# Patient Record
Sex: FEMALE | Race: WHITE | NOT HISPANIC OR LATINO | Employment: FULL TIME | URBAN - METROPOLITAN AREA
[De-identification: names, ages, dates, MRNs, and addresses within clinical notes are randomized per-mention and may not be internally consistent; named-entity substitution may affect disease eponyms.]

---

## 2020-01-29 ENCOUNTER — OFFICE VISIT (OUTPATIENT)
Dept: URGENT CARE | Facility: URGENT CARE | Age: 30
End: 2020-01-29
Payer: COMMERCIAL

## 2020-01-29 VITALS
OXYGEN SATURATION: 100 % | WEIGHT: 135 LBS | HEART RATE: 65 BPM | DIASTOLIC BLOOD PRESSURE: 73 MMHG | SYSTOLIC BLOOD PRESSURE: 110 MMHG | TEMPERATURE: 98.5 F

## 2020-01-29 DIAGNOSIS — L03.011 PARONYCHIA, FINGER, RIGHT: Primary | ICD-10-CM

## 2020-01-29 PROCEDURE — 99213 OFFICE O/P EST LOW 20 MIN: CPT | Mod: 25 | Performed by: PHYSICIAN ASSISTANT

## 2020-01-29 PROCEDURE — 10060 I&D ABSCESS SIMPLE/SINGLE: CPT | Performed by: PHYSICIAN ASSISTANT

## 2020-01-29 RX ORDER — LEVOTHYROXINE SODIUM 50 UG/1
50 TABLET ORAL
COMMUNITY
Start: 2016-04-13 | End: 2022-08-22

## 2020-01-29 RX ORDER — CEPHALEXIN 500 MG/1
500 CAPSULE ORAL 4 TIMES DAILY
Qty: 20 CAPSULE | Refills: 0 | Status: SHIPPED | OUTPATIENT
Start: 2020-01-29 | End: 2020-02-03

## 2020-01-29 ASSESSMENT — ENCOUNTER SYMPTOMS: WOUND: 1

## 2020-01-30 NOTE — PATIENT INSTRUCTIONS
Patient Education     Paronychia of the Finger or Toe  Paronychia is an infection near a fingernail or toenail. It usually occurs when an opening in the cuticle or an ingrown toenail lets bacteria under the skin.  The infection will need to be drained if pus is present. If the infection has been caught early, you may need only antibiotic treatment. Healing will take about 1 to 2 weeks.  Home care  Follow these guidelines when caring for yourself at home:    Clean and soak the toe or finger. Do this 2 times a day for the first 3 days. To do so:  ? Soak your foot or hand in a tub of warm water for 5 minutes. Or hold your toe or finger under a faucet of warm running water for 5 minutes.  ? Clean any crust away with soap and water using a cotton swab.  ? Put antibiotic ointment on the infected area.    Change the dressing daily or any time it gets dirty.    If you were given antibiotics, take them as directed until they are all gone.    If your infection is on a toe, wear comfortable shoes with a lot of toe room. You can also wear open-toed sandals while your toe heals.    You may use over-the-counter medicine (acetaminophen or ibuprofen to help with pain, unless another medicine was prescribed. If you have chronic liver or kidney disease, talk with your healthcare provider before using these medicines. Also talk with your provider if you've had a stomach ulcer or GI (gastrointestinal) bleeding.  Prevention  The following can prevent paronychia:    Avoid cutting or playing with your cuticles at home.    Don't bite your nails.    Don't suck on your thumbs or fingers.  Follow-up care  Follow up with your healthcare provider, or as advised.  When to seek medical advice  Call your healthcare provider right away if any of these occur:    Redness, pain, or swelling of the finger or toe gets worse    Red streaks in the skin leading away from the wound    Pus or fluid draining from the nail area    Fever of 100.4 F (38 C) or  higher, or as directed by your provider  Date Last Reviewed: 8/1/2016 2000-2019 [employer]. [address] [place] [address], [place] [address]. All rights reserved. This information is not intended as a substitute for professional medical care. Always follow your healthcare professional's instructions.

## 2020-01-30 NOTE — PROGRESS NOTES
SUBJECTIVE:   Nory Woods is a 29 year old female presenting with a chief complaint of   Chief Complaint   Patient presents with     Urgent Care     Blister     infection on middle finger pt thinks need to be drained       She is a new patient of [place].  Patient is RHD.  [facility], as they are unable to drain.  No fevers.  Patient has had for a 7-8 days.  Patient believes it was from a pulled cuticle.      Review of Systems   Skin: Positive for wound.   All other systems reviewed and are negative.      No past medical history on file.  No family history on file.  Current Outpatient Medications   Medication Sig Dispense Refill     cephALEXin (KEFLEX) 500 MG capsule Take 1 capsule (500 mg) by mouth 4 times daily for 5 days 20 capsule 0     levothyroxine (SYNTHROID/LEVOTHROID) 50 MCG tablet Take 50 mcg by mouth       sertraline (ZOLOFT) 50 MG tablet Take 50 mg by mouth       Social History     Tobacco Use     Smoking status: Never Smoker   Substance Use Topics     Alcohol use: No       OBJECTIVE  /73   Pulse 65   Temp 98.5  F (36.9  C) (Oral)   Wt 61.2 kg (135 lb)   SpO2 100%     Physical Exam  Vitals signs and nursing note reviewed.   Constitutional:       Appearance: Normal appearance. She is normal weight.   Eyes:      Extraocular Movements: Extraocular movements intact.      Conjunctiva/sclera: Conjunctivae normal.   Cardiovascular:      Rate and Rhythm: Normal rate.   Musculoskeletal: Normal range of motion.      Comments: RH, 3rd digit lateral aspect with 2 mm area of white fluid and minimally surrounding erythema.  Full ROM, good cap RF.   Skin:     General: Skin is warm and dry.      Capillary Refill: Capillary refill takes less than 2 seconds.   Neurological:      General: No focal deficit present.      Mental Status: She is alert and oriented to person, place, and time.   Psychiatric:         Mood and Affect: Mood normal.         Behavior: Behavior normal.          Labs:  No results found for this or any previous visit (from the past 24 hour(s)).    X-Ray was not done.    ASSESSMENT:      ICD-10-CM    1. Paronychia, finger, right L03.011 cephALEXin (KEFLEX) 500 MG capsule        Medical Decision Making:    Differential Diagnosis:  Paronychia, herpetic idvina    Serious Comorbid Conditions:  Adult:  None    PLAN:    I&D performed, well tolerated by patient. Small white return after incision made with #11 blade.   Patient bandaged with bacitracin.  Rx for keflex.  Discussed reasons to seek immediate medical attention.      Followup:    If not improving or if condition worsens, follow up with your Primary Care Provider, If not improving or if conditions worsens over the next 12-24 hours, go to the Emergency Department    Patient Instructions     Patient Education     Paronychia of the Finger or Toe  Paronychia is an infection near a fingernail or toenail. It usually occurs when an opening in the cuticle or an ingrown toenail lets bacteria under the skin.  The infection will need to be drained if pus is present. If the infection has been caught early, you may need only antibiotic treatment. Healing will take about 1 to 2 weeks.  Home care  Follow these guidelines when caring for yourself at home:    Clean and soak the toe or finger. Do this 2 times a day for the first 3 days. To do so:  ? Soak your foot or hand in a tub of warm water for 5 minutes. Or hold your toe or finger under a faucet of warm running water for 5 minutes.  ? Clean any crust away with soap and water using a cotton swab.  ? Put antibiotic ointment on the infected area.    Change the dressing daily or any time it gets dirty.    If you were given antibiotics, take them as directed until they are all gone.    If your infection is on a toe, wear comfortable shoes with a lot of toe room. You can also wear open-toed sandals while your toe heals.    You may use over-the-counter medicine (acetaminophen or  ibuprofen to help with pain, unless another medicine was prescribed. If you have chronic liver or kidney disease, talk with your healthcare provider before using these medicines. Also talk with your provider if you've had a stomach ulcer or GI (gastrointestinal) bleeding.  Prevention  The following can prevent paronychia:    Avoid cutting or playing with your cuticles at home.    Don't bite your nails.    Don't suck on your thumbs or fingers.  Follow-up care  Follow up with your healthcare provider, or as advised.  When to seek medical advice  Call your healthcare provider right away if any of these occur:    Redness, pain, or swelling of the finger or toe gets worse    Red streaks in the skin leading away from the wound    Pus or fluid draining from the nail area    Fever of 100.4 F (38 C) or higher, or as directed by your provider  Date Last Reviewed: 8/1/2016 2000-2019 [employer]. [address] [place] [address], [place] [address]. All rights reserved. This information is not intended as a substitute for professional medical care. Always follow your healthcare professional's instructions.

## 2020-12-06 ENCOUNTER — HEALTH MAINTENANCE LETTER (OUTPATIENT)
Age: 30
End: 2020-12-06

## 2021-09-25 ENCOUNTER — HEALTH MAINTENANCE LETTER (OUTPATIENT)
Age: 31
End: 2021-09-25

## 2021-11-18 RX ORDER — AZITHROMYCIN 500 MG/1
TABLET, FILM COATED ORAL
COMMUNITY
Start: 2019-01-02 | End: 2021-11-19

## 2021-11-19 ENCOUNTER — OFFICE VISIT (OUTPATIENT)
Dept: MIDWIFE SERVICES | Facility: CLINIC | Age: 31
End: 2021-11-19
Payer: COMMERCIAL

## 2021-11-19 VITALS
BODY MASS INDEX: 21.99 KG/M2 | SYSTOLIC BLOOD PRESSURE: 102 MMHG | WEIGHT: 140.1 LBS | DIASTOLIC BLOOD PRESSURE: 58 MMHG | HEIGHT: 67 IN

## 2021-11-19 DIAGNOSIS — Z00.00 ENCOUNTER FOR MEDICAL EXAMINATION TO ESTABLISH CARE: Primary | ICD-10-CM

## 2021-11-19 DIAGNOSIS — O36.80X0 PREGNANCY WITH INCONCLUSIVE FETAL VIABILITY, SINGLE OR UNSPECIFIED FETUS: ICD-10-CM

## 2021-11-19 LAB — B-HCG SERPL-ACNC: 212 IU/L (ref 0–5)

## 2021-11-19 PROCEDURE — 99202 OFFICE O/P NEW SF 15 MIN: CPT | Performed by: ADVANCED PRACTICE MIDWIFE

## 2021-11-19 PROCEDURE — 36415 COLL VENOUS BLD VENIPUNCTURE: CPT | Performed by: ADVANCED PRACTICE MIDWIFE

## 2021-11-19 PROCEDURE — 84702 CHORIONIC GONADOTROPIN TEST: CPT | Performed by: ADVANCED PRACTICE MIDWIFE

## 2021-11-19 RX ORDER — PRENATAL VIT/IRON FUM/FOLIC AC 27MG-0.8MG
1 TABLET ORAL DAILY
COMMUNITY
End: 2024-09-16

## 2021-11-19 RX ORDER — SERTRALINE HYDROCHLORIDE 25 MG/1
TABLET, FILM COATED ORAL
COMMUNITY
Start: 2021-09-17 | End: 2021-11-19

## 2021-11-19 ASSESSMENT — MIFFLIN-ST. JEOR: SCORE: 1393.12

## 2021-11-19 NOTE — PROGRESS NOTES
"  SUBJECTIVE:                                                   Nory Woods is a 31 year old who presents to clinic today for the following health issue(s):      HPI: Nory is here for a meet and greet, has a good friend who has seen our group for pregnancy care and recommended she see us. Had a positive pregnancy test on Sunday 11/14, LMP 10/13/21, making her 5w2d today. She would also like quant hcgs today to ensure pregnancy is progressing as expected.     Overall she is feeling well. Has not had any bleeding or spotting since LMP. Has had some mild intermittent cramping.     Hx of hypothyroidism, taking synthroid 50 mcg daily, management by outside provider.     Patient's last menstrual period was 10/13/2021 (exact date).  Menstrual History: frequency: every 28-30 days  Patient is sexually active  No obstetric history on file..  Using none for contraception.   Health maintenance updated:  yes  STI infx testing offered:  Declined      Problem list and histories reviewed & adjusted, as indicated.  Additional history: as documented.    Patient Active Problem List   Diagnosis     Hypothyroidism     Past Surgical History:   Procedure Laterality Date     WISDOM TOOTH EXTRACTION  2013      Social History     Tobacco Use     Smoking status: Never Smoker     Smokeless tobacco: Never Used   Substance Use Topics     Alcohol use: Not Currently      No data available            Current Outpatient Medications   Medication Sig     levothyroxine (SYNTHROID/LEVOTHROID) 50 MCG tablet Take 50 mcg by mouth     Prenatal Vit-Fe Fumarate-FA (PRENATAL MULTIVITAMIN W/IRON) 27-0.8 MG tablet Take 1 tablet by mouth daily     No current facility-administered medications for this visit.     No Known Allergies    ROS:  12 point review of systems negative other than symptoms noted below.    OBJECTIVE:     /58 (BP Location: Right arm, Patient Position: Sitting, Cuff Size: Adult Regular)   Ht 1.702 m (5' 7\")   Wt 63.5 kg (140 " lb 1.6 oz)   LMP 10/13/2021 (Exact Date)   Breastfeeding No   BMI 21.94 kg/m    Body mass index is 21.94 kg/m .    PHYSICAL EXAM:  Constitutional:  Appearance: Well nourished, well developed alert, in no acute distress  Chest:  Respiratory Effort:  Breathing unlabored.   Neurologic:  Mental Status:  Oriented X3.  Normal strength and tone, sensory exam grossly normal, mentation intact and speech normal.    Psychiatric:  Mentation appears normal and affect normal/bright.     In-Clinic Test Results:  No results found for this or any previous visit (from the past 24 hour(s)).    ASSESSMENT/PLAN:                                                        ICD-10-CM    1. Encounter for medical examination to establish care  Z00.00    2. Encounter to determine fetal viability of pregnancy  O36.80X0    3. Pregnancy with inconclusive fetal viability, single or unspecified fetus  O36.80X0 HCG Quantitative Pregnancy, Blood ([id number])     HCG Quantitative Pregnancy, Blood ([id number])     HCG Quantitative Pregnancy, Blood ([id number])       COUNSELING:  -Discussed [facility] philosophy of care, [facility] group/clinic/call structure.  -Hcg quant drawn today, discussed single value will not tell us much information, will need to repeat hcg in 48 hours to determine appropriate rise in levels. Discussed if hcg rises as expected then no need to continue with hcg levels and we can assume her pregnancy is progressing as expected as long is she is not having any vaginal bleeding/severe cramping/abdominal pain. Future hcg quant ordered   -SAB/ectopic warning signs reviewed, is aware of what to report and when to call   -Advised following up with endocrine provider, discussed it is not uncommon to need adjustments to synthroid dose in pregnancy   -RTC for for new OB appt/US approx. 8 wks from LMP    MAHAD Stone, [facility]

## 2021-11-19 NOTE — RESULT ENCOUNTER NOTE
Hcg result sent via [facility]. Plan for repeat hcg in 48 hours. If appropriate rise then no further hcg level needed unless she is having bleeding/cramping. Has new OB appt and US scheduled.     MAHAD Stone, [place]

## 2021-11-21 ENCOUNTER — LAB (OUTPATIENT)
Dept: LAB | Facility: CLINIC | Age: 31
End: 2021-11-21
Payer: COMMERCIAL

## 2021-11-21 DIAGNOSIS — O36.80X0 PREGNANCY WITH INCONCLUSIVE FETAL VIABILITY, SINGLE OR UNSPECIFIED FETUS: ICD-10-CM

## 2021-11-21 LAB — B-HCG SERPL-ACNC: 541 IU/L (ref 0–5)

## 2021-11-21 PROCEDURE — 84702 CHORIONIC GONADOTROPIN TEST: CPT

## 2021-11-21 PROCEDURE — 36415 COLL VENOUS BLD VENIPUNCTURE: CPT

## 2021-11-22 ENCOUNTER — TELEPHONE (OUTPATIENT)
Dept: MIDWIFE SERVICES | Facility: CLINIC | Age: 31
End: 2021-11-22
Payer: COMMERCIAL

## 2021-11-22 NOTE — TELEPHONE ENCOUNTER
LMP 10/13/21 - 5w5d    Noticed vaginal brown discharge just 30 min ago  Denies pain or cramping   Scant, brown mixed in vaginal discharge  Admits had IC earlier today.    Reassured pt spotting can be common in 1st trimester and after IC. Continue to monitor, push fluids. Nothing she did wrong.  Call w bright red bleeding and/or cramping/pain.    Pt verbalized understanding, in agreement with plan, and voiced no further questions.  Susan Morocho RN on 2021 at 2:12 PM

## 2021-12-02 ENCOUNTER — PRENATAL OFFICE VISIT (OUTPATIENT)
Dept: NURSING | Facility: CLINIC | Age: 31
End: 2021-12-02
Payer: COMMERCIAL

## 2021-12-02 VITALS — WEIGHT: 136 LBS | BODY MASS INDEX: 21.3 KG/M2

## 2021-12-02 DIAGNOSIS — Z34.01 ENCOUNTER FOR SUPERVISION OF NORMAL FIRST PREGNANCY IN FIRST TRIMESTER: Primary | ICD-10-CM

## 2021-12-02 PROCEDURE — 99207 PR NO CHARGE NURSE ONLY: CPT

## 2021-12-02 NOTE — PROGRESS NOTES
SUBJECTIVE:     HPI:    This is a 31 year old female patient,  who presents for her first obstetrical visit.    CHELSEY: 2022, by Last Menstrual Period.  She is 7w1d weeks.  Her cycles are regular.  Her last menstrual period was normal.   Since her LMP, she has experienced  nausea, fatigue and cramping).   She denies emesis, abdominal pain, headache, loss of appetite, vaginal discharge, dysuria, pelvic pain, urinary urgency, lightheadedness, urinary frequency, vaginal bleeding, hemorrhoids and constipation.    Additional History: mild depression- in past has been on sertraline-not currently on meds, hypothyroidism-on levothyroxine managed by PCP,  Kana Wylie-hx of cold sores  Avoids dairy d/t upset stomach however does eat yogurt.   Have you travelled during the pregnancy?Yes, If yes, where? In [place] not international if yes, who? [place]      HISTORY:   Planned Pregnancy: Yes  Marital Status:   Occupation:  currently works from home.   Living in Household: Spouse    Past History:  Her past medical history   Past Medical History:   Diagnosis Date     Depressive disorder      History of PCOS      Hypothyroidism      Mild depression (H)    .      She has a history of  first pregnancy     Since her last LMP she denies use of alcohol, tobacco and street drugs.    Past medical, surgical, social and family history were reviewed and updated in [facility].        Current Outpatient Medications   Medication     levothyroxine (SYNTHROID/LEVOTHROID) 50 MCG tablet     Prenatal Vit-Fe Fumarate-FA (PRENATAL MULTIVITAMIN W/IRON) 27-0.8 MG tablet     No current facility-administered medications for this visit.       ROS:   12 point review of systems negative other than symptoms noted below or in the HPI.  Constitutional: Fatigue  Gastrointestinal: Nausea  Genitourinary: Cramps      OBJECTIVE:     EXAM:  Wt 61.7 kg (136 lb)   LMP 10/13/2021 (Exact Date)   BMI 21.30 kg/m   Body mass  index is 21.3 kg/m .    Nurse phone visit completed. Prenatal book and folder (containing standard educational hand-outs and brochures)  will be given at the next visit to patient. Information in folder reviewed over the phone. Questions answered. Brochure given on optional screening available to assess chromosomal anomalies. Pt desires NIPS. Pt advised to call the clinic if she has any questions or concerns related to her pregnancy. Prenatal labs future ordered. New prenatal visit scheduled on 12/10/2021 with Julia Noel CNM.      No results found for: PAP  PHQ-9 score:    PHQ 12/1/2021   PHQ-9 Total Score 0   Q9: Thoughts of better off dead/self-harm past 2 weeks Not at all       DORCAS-7 SCORE 12/1/2021   Total Score 1 (minimal anxiety)   Total Score 1       Patient supplied answers from flow sheet for:  Prenatal OB Questionnaire.  Past Medical History  Have you ever recieved care for your mental health? : (!) Yes  Have you ever been in a major accident or suffered serious trauma?: No  Within the last year, has anyone hit, slapped, kicked or otherwise hurt you?: No  In the last year, has anyone forced you to have sex when you didn't want to?: No    Past Medical History 2   Have you ever received a blood transfusion?: No  Would you accept a blood transfusion if was medically recommended?: Yes  Does anyone in your home smoke?: No   Is your blood type Rh negative?: Unknown  Have you ever ?: No  Have you been hospitalized for a nonsurgical reason excluding normal delivery?: No  Have you ever had an abnormal pap smear?: No    Past Medical History (Continued)  Do you have a history of abnormalities of the uterus?: No  Did your mother take SONY or any other hormones when she was pregnant with you?: No  Do you have any other problems we have not asked about which you feel may be important to this pregnancy?: (!) Yes     Viky Martinez RN

## 2021-12-07 PROBLEM — Z34.00 SUPERVISION OF NORMAL FIRST PREGNANCY: Status: ACTIVE | Noted: 2021-12-07

## 2021-12-09 NOTE — PATIENT INSTRUCTIONS
Thank you for coming to see the Midwives at the   [facility]!      We will notify you about your labs that were drawn today once we get the results back.  If you have [employer] your lab results will be posted there.      Someone from the clinic will call you personally or send you a [employer] message with your results.      If you need any refills of medications please call your pharmacy and they will contact us.      If you have a medical emergency please call 911.      If you have any concerns about today's visit, wish to schedule another appointment, or have an urgent medical concern please call our office at [phone]. You can also make appointments through [employer].      After hours you may also call the clinic number above to be connected with [facility]'s after hours triage nurse.  The nurse can page the midwife on call if needed. There is always a midwife on call 24 hours a day.    Prenatal Care Recommendations:    Before 14 weeks: Dating ultrasound, genetic testing       This ultrasound helps us determine your dates accurately. Innatal (genetic screening test) can be drawn anytime after 10 weeks of gestation.    16 weeks: Optional genetic testing single AFP       This testing helps understand your baby's risk for some genetic abnormalities.    18-22 weeks:  Screening anatomy ultrasound       This testing will look for early growth abnormalities, placenta location, and may tell the baby's gender if you wish to find out.    24-27 weeks: One hour diabetes test (GCT) and complete blood count       This test helps identify diabetes of pregnancy or gestational diabetes.  We also look   at the iron in your blood and how well your blood clots.    28 - 36 weeks: Tetanus shot (Tdap)       This shot helps protect you and your baby from whooping cough.    36 weeks and later: Group B Strep test (GBS)       This test helps predict if you need antibiotics in labor to prevent infection for your baby.    Anytime  September to April:  Flu shot       This shot helps protect you and your family from the flu.  This is especially important during pregnancy.        The typical schedule after your first visit today you can expect:     Visit 2 - 12-16 weeks  Visit 3 - 20 weeks  Visit 4 - 24 weeks  Visit 5 - 28 weeks  Visit 6 - 30 weeks  Visit 7 - 32 weeks  Visit 8 - 34 weeks  Visit 9 - 36 weeks  Weekly after 36 weeks until delivery.        Any time during or after your pregnancy you may experience increased depression and/or mood changes.    We are here to support you. Please contact us if you are:    Feeling anxious    Overwhelmed or sad     Trouble sleeping    Crying uncontrollably    Trouble caring for yourself or baby.    Any thoughts of hurting yourself, your baby, or anyone else    If anything comes up between your visits or you have concerns please don't hesitate to contact us.    Secure access to your medical record:  Use Zady (secure email communication and access to your chart) to send your primary care provider a message or make an appointment. Ask someone on your Team how to sign up for Zady. To log on to GENELINK or for more information in Zady please visit the website at [email or web address].       Certified Nurse Midwife (CNM) Team    DELROY Rodriguez, MAHAD Reddy, DELROY, [facility]  Kevin Kruger DNP, APRN, CNM  Marilia Olivas DNP, APRBRYAN, CNM      Again, thank you for choosing the midwives at [facility].  We are excited to be a part of your pregnancy. Please let us know how we can best partner with you to improve your and your family's health.    Remedies for nausea and vomiting with pregnancy      Eat small frequent meals every 2-3 hours if possible.       Avoid food at extremes of temperature and drinks with carbonation.      Eat foods that appeal to you, avoiding fats and spicy foods.      Avoid liquids with foods.  Drink liquids 30-60 minutes  before or after eating and sip slowly.      Bread, pasta, crackers, potatoes, and rice tend to be tolerated the best.      Don't worry about what you eat in the first 3 months, it is more important that you can eat and keep it down.       Try flat ginger ale or ginger tea      Before rising in the morning, eat a small amount of crackers or dry toast.      Peppermint tea      Ginger is a herbal remedy for nausea and you can use it in any form.  There are ginger tablets you can purchase.  The dose 1000 mg a day in divided doses.       You may also try doxylamine (Unisom) 12.5 mg three times a day which is a sleeping medication along with Vitamin B6 25 mg three times a day.  This combination takes up to a week to work so give it some time.       Benadryl (diphenhydramine) 25-50 mg every 8 hour or Dramamine (dimenhydrinate)  mg by mouth every 4-6 hours. Both of these medication may cause some drowsiness      Other things that may help include an Accupressure band and acupuncture      If these methods fail there are many prescriptions that we can try    If you begin to vomit more than 5 or 6 times a day and feel that you are unable to keep anything down, call the [facility] at [phone]    Genetic Screening in Pregnancy    There are several options you have for genetic screening in your pregnancy.  Everyone has their own personal reasons to screen or not to screen.  We want you to make the best choice for you and your pregnancy.  Below is a list of options, what they screen for and when the screening is done.  Genetic screening is recommended for women who are 35 and older at delivery and for those with a family history of chromosomal abnormalities. However, screening is offered to all women.    Innatal:    This is a screening for the more common chromosome abnormalities, including trisomy 21 (Down's syndrome), trisomy 18, trisomy 13 and sex chromosome abnormalities. This is a prenatal test that  "can be done as early as 10 weeks gestation.       A maternal blood sample is drawn that sequences cell-free DNA in maternal blood stream. This in turn allows for molecular counting of chromosome copy numbers with a >99% detection rate of Down syndrome, a 97% detection rate of Trisomy 18, and a 78% detection rate of Trisomy 13.    This test will give information about the gender of the baby.  You can choose to not have that disclosed.       Results come back within 10-14 business days.     About 5% of samples will have results that are designated as \"indeterminate\" or \"uninterruptable.\" With this type of result, genetic counseling and diagnostic testing are recommended. Remember this is a screening test and does not definitively diagnose or exclude the presence of these chromosome conditions.     This screening may or may not be covered by insurance.  We recommend you consult your insurance company to discuss.  Financial assistance is available at a low cost if not covered by your insurance.       Standard Screen:  This test screens for 23 disorders that cause serious health effects in infancy or childhood.  Most hereditary genetic disorders are autosomal recessive, meaning both parents must be carriers for the child to be at risk.  There are some X-linked disorders where only the mother needs to be a carrier for the child to be at risk.   Below are some of the more common of the 23 disorders screened for:     1.  Cystic Fibrosis (CF) is the most common life-shortening autosomal  recessive disease among  populations, with a frequency of 1 in  Every 3,500 live births. Although it mainly affects the   Population anyone can request screening. If you have a family history of  cystic fibrosis you should request this test.  This screening is a blood  draw.      2.  Spinal Muscular Atrophy (SMA) is the most common inherited cause  of infant death.  The most common form of this disorder causes death by " "a age two.  One in every 6,000 to 10,000 babies born in the [place] has SMA.      3.  Fragile X Syndrome (FXS) is the most common inherited cause of  intellectual disability.  Approximately 1 in every 3,600 boys and 1 in every  6,000 girls is born with FXS.      4.  Hemoglobinopathy Evaluation:  Hemoglobin electrophoresis is a  non-invasive blood test that looks at abnormal hemoglobin (component of  red blood cells) production. Examples of this include sickle cell anemia  (which is a disorder of the red blood cells and their shape) and the  thalassemia s (which is also a form of anemia).  High risk groups include:   , Southeast Asians, , Mediterranean, Guinean,  South and Central American and [place] descent. This is a one-time  test.     5. Jae-Sachs (optional):  Is a disorder that results when an enzyme that            helps break down fatty substances is absent.  This is a fatal disorder.                This is most commonly passed from parents who are of Ashkenazi Congregation  descent. One in four to one in five individuals of Ashkenazi Congregation  descent carry a mutation for one of the autosomal recessive disorders  included in a group of disorders sometimes call \"Congregation genetic  Disorders\".      **If you are a carrier of CF, SMA, or a hemoglobinopathy, your partner will also need to be tested. This partner testing is offered at a reduced cost.  This screen can be done prior to pregnancy or at any time during the pregnancy.      Maternal Serum AFP  The screening is ideally done between 15 and 18 weeks of pregnancy but can be done up to week 24. Even if you have done the Innatal testing you can still get a single AFP drawn to check for neural tube defects like Spina Bifida.       Anatomy Ultrasound:    This is a fetal anatomy survey done around 20 weeks gestation.  This ultrasound will look at the heart structure, heart activity, fetal heart rate and rhythm, assessment of the amniotic fluid volume, " placenta appearance and location, the umbilical cord and placental insertion site.  They also do many fetal measurements to make sure the baby is growing properly.  The cervical length is also measured and fetal movement is evaluated.  The gender of the baby can usually be determined.      In the event of a positive screen:    If any screening tests come back with an increased risk, you will be referred to Maternal Fetal Medicine to be seen by a genetic counselor and a doctor.  They will assess your risk and see if further diagnostic testing is warranted.  The options for diagnosis that may be offered are: chorionic villus sampling (CVS), usually done at 11 to 12 weeks of pregnancy and amniocentesis which is generally done later in pregnancy around 15 to 20 weeks.  All risks/benefits would be explained and you can decide what course of action is best for you and your family.    Why you might choose to screen?    A desire to know as much as you can about your baby    If your baby had a genetic abnormality you can learn about it before they are born    Choose whether to continue the pregnancy or to terminate    Why you might choose to NOT screen?    You feel that whatever happens is fine and you would not terminate    You know you don't want to do any diagnostic tests even if the screening test showed a high possibility of a genetic abnormality      For further information please call:  [facility]   [phone]      Fish Safety During Pregnancy    Often time's women worry about eating fish during pregnancy. Fish can be totally safe to eat during pregnancy.However, some fish may contain contaminants that could harm you or your baby if you eat certain types of fish or eat fish too often. Below is a list of which types of fish to eat, how often to eat fish, and which types of fish to avoid while pregnant.    Reasons to eat fish:    Fish is a great source of protein, vitamins, and minerals.    The oils  found in fish are important to unborn and breast fed babies    Eating fish may play a role in the prevention of heart disease in adults       Fish to EAT while pregnant   2 servings per week of any of these types of fish    Catfish (farm raised)  Cod    Crab    Espinal    Flatfish   Oysters    [place]   [place] ([place], not [facility] [place])     Sardines   Scallops    Shrimp   Tilapia   1 serving per week of any of these fish    Canned LIGHT tuna     [place] caught-        Reginald Andersenie   [place]    Yellow Perch   1 serving per MONTH of these types of fish    Canned WHITE tuna  Martiniquais Sea Urbano    Grouper   Halibut    [place]    [place] Roughy    Tuna steak   [place] caught-    Bass    Catfish    [place] smaller than 20 inches    [place] smaller than 30 inches         Servings of fish should be based on your weight, 1 oz for every 20 lbs of body weight. For example: 130 lb person can safely eat 7 oz     150 lb person can safely eat 8 oz     170 lb person can safely eat 9 oz      Make sure to space out meals with fish throughout the month, don't eat all your fish meals for the month within a few days.       Fish to Avoid while Pregnant:      Shark   Juan Manuel Mackerel    Swordfish  Raw Sushi-any type of fish    Tile fish         [place] Caught:      [place] longer than 20 in    [place] longer than 30 in    Musky      Contaminants:      Mercury comes from air pollution and small amounts of mercury can damage the brain that is just starting to form or grow. Too much mercury may affect a child's behavior and lead to learning problems later in life. Too much mercury in adults and older children may cause tingling, numbness in hands and feet, or vision changes    PCBs a man-made substance once used in electrical transformers but was banned in 1967 although it can still be found in the [place]. A baby who are exposed to PCBs during pregnancy may have a lower birth weight, reduced head size,  and delayed physical development. Exposure to PCBs may also cause cancer    PFOS is a man-made chemical to make products that resist heat, oil, stains, and grease. Studies in lab animals exposed to low levels of PFOS showed decreas HDL (good cholesterol) and changes in thyroid hormone levels. The concern about PFOS is with long-term exposure such as consuming large amounts over a long period of time     Mercury and PFOS cannot be removed through cooking or cleaning. By removing fat when cleaning and cooking you can reduce PCBs.      For more information and up to date information about fish safety in [place] please contact the [place] Department of Health ([facility]) or the Department of Natural Resources (DNR):    [email or web address].mn.  DNR: [phone]  [facility]: [phone]    Recommendations for total and rate of weight gain during pregnancy, by prepregnancy BMI    Total weight gain Rates of weight gain*  2nd and 3rd trimester   Prepregnancy BMI Range in kg Range in lbs Mean (range) in kg/week Mean (range) in lbs/week   Underweight (<18.5 kg/m2) 12.5-18 28-40 0.51 (0.44-0.58) 1 (1-1.3)   Normal weight (18.5-24.9 kg/m2) 11.5-16 25-35 0.42 (0.35-0.50) 1 (0.8-1)   Overweight (25.0-29.9 kg/m2) 7-11.5 15-25 0.28 (0.23-0.33) 0.6 (0.5-0.7)   Obese (?30.0 kg/m2) 5-9 11-20 0.22 (0.17-0.27) 0.5 (0.4-0.6)   BMI: body mass index.  * Calculations assume a 0.5-2 kg (1.1-4.4 lbs) weight gain in the first trimester.  * To calculate BMI go to [email or web address]/

## 2021-12-10 ENCOUNTER — ANCILLARY PROCEDURE (OUTPATIENT)
Dept: ULTRASOUND IMAGING | Facility: CLINIC | Age: 31
End: 2021-12-10
Payer: COMMERCIAL

## 2021-12-10 ENCOUNTER — PRENATAL OFFICE VISIT (OUTPATIENT)
Dept: MIDWIFE SERVICES | Facility: CLINIC | Age: 31
End: 2021-12-10
Payer: COMMERCIAL

## 2021-12-10 VITALS — BODY MASS INDEX: 22.08 KG/M2 | DIASTOLIC BLOOD PRESSURE: 68 MMHG | WEIGHT: 141 LBS | SYSTOLIC BLOOD PRESSURE: 108 MMHG

## 2021-12-10 DIAGNOSIS — E03.9 HYPOTHYROIDISM AFFECTING PREGNANCY, ANTEPARTUM: Primary | ICD-10-CM

## 2021-12-10 DIAGNOSIS — Z91.89: ICD-10-CM

## 2021-12-10 DIAGNOSIS — R11.0 NAUSEA: ICD-10-CM

## 2021-12-10 DIAGNOSIS — Z34.01 ENCOUNTER FOR SUPERVISION OF NORMAL FIRST PREGNANCY IN FIRST TRIMESTER: ICD-10-CM

## 2021-12-10 DIAGNOSIS — O99.280 HYPOTHYROIDISM AFFECTING PREGNANCY, ANTEPARTUM: Primary | ICD-10-CM

## 2021-12-10 DIAGNOSIS — Z34.01 ENCOUNTER FOR SUPERVISION OF NORMAL FIRST PREGNANCY IN FIRST TRIMESTER: Primary | ICD-10-CM

## 2021-12-10 DIAGNOSIS — Z3A.08 8 WEEKS GESTATION OF PREGNANCY: ICD-10-CM

## 2021-12-10 LAB
ABO/RH(D): NORMAL
ALBUMIN UR-MCNC: NEGATIVE MG/DL
ANTIBODY SCREEN: NEGATIVE
APPEARANCE UR: CLEAR
BILIRUB UR QL STRIP: NEGATIVE
COLOR UR AUTO: YELLOW
ERYTHROCYTE [DISTWIDTH] IN BLOOD BY AUTOMATED COUNT: 12.3 % (ref 10–15)
GLUCOSE UR STRIP-MCNC: NEGATIVE MG/DL
HBV SURFACE AG SERPL QL IA: NONREACTIVE
HCT VFR BLD AUTO: 40.6 % (ref 35–47)
HCV AB SERPL QL IA: NONREACTIVE
HGB BLD-MCNC: 13.4 G/DL (ref 11.7–15.7)
HGB UR QL STRIP: NEGATIVE
HIV 1+2 AB+HIV1 P24 AG SERPL QL IA: NONREACTIVE
KETONES UR STRIP-MCNC: NEGATIVE MG/DL
LEUKOCYTE ESTERASE UR QL STRIP: NEGATIVE
MCH RBC QN AUTO: 28.6 PG (ref 26.5–33)
MCHC RBC AUTO-ENTMCNC: 33 G/DL (ref 31.5–36.5)
MCV RBC AUTO: 87 FL (ref 78–100)
NITRATE UR QL: NEGATIVE
PH UR STRIP: 7 [PH] (ref 5–7)
PLATELET # BLD AUTO: 214 10E3/UL (ref 150–450)
RBC # BLD AUTO: 4.69 10E6/UL (ref 3.8–5.2)
RUBV IGG SERPL QL IA: 1.5 INDEX
RUBV IGG SERPL QL IA: POSITIVE
SP GR UR STRIP: 1.02 (ref 1–1.03)
SPECIMEN EXPIRATION DATE: NORMAL
T PALLIDUM AB SER QL: NONREACTIVE
UROBILINOGEN UR STRIP-ACNC: 0.2 E.U./DL
WBC # BLD AUTO: 4.7 10E3/UL (ref 4–11)

## 2021-12-10 PROCEDURE — 86803 HEPATITIS C AB TEST: CPT | Performed by: ADVANCED PRACTICE MIDWIFE

## 2021-12-10 PROCEDURE — 84443 ASSAY THYROID STIM HORMONE: CPT | Performed by: ADVANCED PRACTICE MIDWIFE

## 2021-12-10 PROCEDURE — 86901 BLOOD TYPING SEROLOGIC RH(D): CPT | Performed by: ADVANCED PRACTICE MIDWIFE

## 2021-12-10 PROCEDURE — 86900 BLOOD TYPING SEROLOGIC ABO: CPT | Performed by: ADVANCED PRACTICE MIDWIFE

## 2021-12-10 PROCEDURE — 87340 HEPATITIS B SURFACE AG IA: CPT | Performed by: ADVANCED PRACTICE MIDWIFE

## 2021-12-10 PROCEDURE — 86762 RUBELLA ANTIBODY: CPT | Performed by: ADVANCED PRACTICE MIDWIFE

## 2021-12-10 PROCEDURE — 85027 COMPLETE CBC AUTOMATED: CPT | Performed by: ADVANCED PRACTICE MIDWIFE

## 2021-12-10 PROCEDURE — 76817 TRANSVAGINAL US OBSTETRIC: CPT | Performed by: OBSTETRICS & GYNECOLOGY

## 2021-12-10 PROCEDURE — 87389 HIV-1 AG W/HIV-1&-2 AB AG IA: CPT | Performed by: ADVANCED PRACTICE MIDWIFE

## 2021-12-10 PROCEDURE — 36415 COLL VENOUS BLD VENIPUNCTURE: CPT | Performed by: ADVANCED PRACTICE MIDWIFE

## 2021-12-10 PROCEDURE — 87086 URINE CULTURE/COLONY COUNT: CPT | Performed by: ADVANCED PRACTICE MIDWIFE

## 2021-12-10 PROCEDURE — 86780 TREPONEMA PALLIDUM: CPT | Performed by: ADVANCED PRACTICE MIDWIFE

## 2021-12-10 PROCEDURE — 99207 PR FIRST OB VISIT: CPT | Performed by: ADVANCED PRACTICE MIDWIFE

## 2021-12-10 PROCEDURE — 86850 RBC ANTIBODY SCREEN: CPT | Performed by: ADVANCED PRACTICE MIDWIFE

## 2021-12-10 PROCEDURE — 81003 URINALYSIS AUTO W/O SCOPE: CPT | Performed by: ADVANCED PRACTICE MIDWIFE

## 2021-12-11 LAB — BACTERIA UR CULT: NO GROWTH

## 2021-12-12 LAB — TSH SERPL DL<=0.005 MIU/L-ACNC: 1.32 MU/L (ref 0.4–4)

## 2021-12-21 NOTE — H&P (VIEW-ONLY)
Nory Woods is a 31 year old female, 10w0d weeks gestation by LMP.  Ultrasound performed here in clinic today confirms missed AB, no fetal heartrate.   Patient is not currently bleeding.   Patient is not cramping/having abdominal pain.  Patient is seen here with her  Dejan and informed of the results.  Reassured her that the loss could not have been stopped by her actions.  Nory had US 12/10 that showed baby measuring 7w with ; today no FHT measuring 8w2d.  Nory is tearful, she wishes to discuss next steps, this was there first try, is thankful she was able to get pregnant when she has many friends struggling with fertility issues. 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    Patient Active Problem List   Diagnosis     Hypothyroidism     Supervision of normal first pregnancy     Past Medical History:   Diagnosis Date     Depressive disorder      History of PCOS 2011     Hypothyroidism 2011     Mild depression (H)      Supervision of normal first pregnancy 12/7/2021     Past Surgical History:   Procedure Laterality Date     WISDOM TOOTH EXTRACTION  2013     Current Outpatient Medications   Medication Sig Dispense Refill     levothyroxine (SYNTHROID/LEVOTHROID) 50 MCG tablet Take 50 mcg by mouth       Prenatal Vit-Fe Fumarate-FA (PRENATAL MULTIVITAMIN W/IRON) 27-0.8 MG tablet Take 1 tablet by mouth daily       No Known Allergies    Health maintenance updated:  yes     LMP 10/13/2021 (Exact Date)      Physical Exam  Constitutional:       Appearance: Normal appearance.   Cardiovascular:      Rate and Rhythm: Normal rate and regular rhythm.   Pulmonary:      Effort: Pulmonary effort is normal.      Breath sounds: Normal breath sounds.   Skin:     General: Skin is warm and dry.   Neurological:      Mental Status: She is alert and oriented to person, place, and time.   Psychiatric:          Mood and Affect: Mood normal.         Behavior: Behavior normal.      Comments: Appropriately emotional/tearful         O POS  Assess need for Rhogam:  NO      ASSESSMENT/PLAN:     ICD-10-CM    1. Missed   O02.1    2. Encounter to discuss test results  Z71.2          COUNSELING:    Discussed expectations of bleeding and options and indications for D&C in relation to miscarriage.     Reviewed expectant management, medications, and D&C, likely leaning toward this option but wishes to think about it and will call us back in the next day or two with her decision     Discussed causes of miscarriage including chromosome abnormalities. Understands that nothing can be done to prevent a miscarriage from occuring.       Can use Ibuprofen for the cramping/Narcotics if needed.    We reviewed bleeding can be significant if managed at home, call with any concerns. Not currently bleeding    Encouraged good self care and utilizing support system    Handout provided about options discussed today    Recommend she check in tomorrow to coordinate D&C if desired, labs done at new OB    20 minutes spent on the date of the encounter doing chart review, review of test results, patient visit and documentation     MAHAD Robbins, DELROY

## 2021-12-22 ENCOUNTER — TELEPHONE (OUTPATIENT)
Dept: MIDWIFE SERVICES | Facility: CLINIC | Age: 31
End: 2021-12-22

## 2021-12-22 ENCOUNTER — ANCILLARY PROCEDURE (OUTPATIENT)
Dept: ULTRASOUND IMAGING | Facility: CLINIC | Age: 31
End: 2021-12-22
Attending: ADVANCED PRACTICE MIDWIFE
Payer: COMMERCIAL

## 2021-12-22 ENCOUNTER — TELEPHONE (OUTPATIENT)
Dept: OBGYN | Facility: CLINIC | Age: 31
End: 2021-12-22

## 2021-12-22 ENCOUNTER — VIRTUAL VISIT (OUTPATIENT)
Dept: MIDWIFE SERVICES | Facility: CLINIC | Age: 31
End: 2021-12-22
Payer: COMMERCIAL

## 2021-12-22 DIAGNOSIS — Z71.2 ENCOUNTER TO DISCUSS TEST RESULTS: ICD-10-CM

## 2021-12-22 DIAGNOSIS — O02.1 MISSED ABORTION: Primary | ICD-10-CM

## 2021-12-22 DIAGNOSIS — Z34.01 ENCOUNTER FOR SUPERVISION OF NORMAL FIRST PREGNANCY IN FIRST TRIMESTER: ICD-10-CM

## 2021-12-22 PROCEDURE — 99213 OFFICE O/P EST LOW 20 MIN: CPT | Performed by: ADVANCED PRACTICE MIDWIFE

## 2021-12-22 PROCEDURE — 76817 TRANSVAGINAL US OBSTETRIC: CPT | Performed by: OBSTETRICS & GYNECOLOGY

## 2021-12-22 NOTE — RESULT ENCOUNTER NOTE
Results discussed directly with patient while patient was present. Any further details documented in the note.   MAHAD Solomon CNM

## 2021-12-22 NOTE — TELEPHONE ENCOUNTER
Called patient back, she wishes to proceed with D&C next week while she is off. Will work with MDs to get a case request and explore OR schedule. Will try to touch base with her by the end of the day or tomorrow morning.    MAHAD Robbins, VERONIQUEM

## 2021-12-22 NOTE — TELEPHONE ENCOUNTER
TIMEFINDER has been dropped.  Marie w/[facility] is holding time at 12:10 for this case on 12/28.  Once case request is received, dump into depot and call to schedule    Joya Ferraro  Surgery Scheduler

## 2021-12-23 ENCOUNTER — TELEPHONE (OUTPATIENT)
Dept: MIDWIFE SERVICES | Facility: CLINIC | Age: 31
End: 2021-12-23
Payer: COMMERCIAL

## 2021-12-23 NOTE — TELEPHONE ENCOUNTER
"Call to Nory to check in. Reports receiving a call from Nasrin Yadav today to discuss scheduling a D&C. This is the option Nory and her  have decided upon, \"just want to move on. \"  Dr. Iraheta will perform Nory's D&C, likely next Tuesday. Just waiting for final orders.   Encouraged Nory to reach out with any additional concerns or questions.     Julia TOBIN CNM    "

## 2021-12-24 ENCOUNTER — PREP FOR PROCEDURE (OUTPATIENT)
Dept: OBGYN | Facility: CLINIC | Age: 31
End: 2021-12-24
Payer: COMMERCIAL

## 2021-12-24 DIAGNOSIS — Z11.59 ENCOUNTER FOR SCREENING FOR OTHER VIRAL DISEASES: ICD-10-CM

## 2021-12-24 DIAGNOSIS — O02.1 MISSED ABORTION: Primary | ICD-10-CM

## 2021-12-24 NOTE — TELEPHONE ENCOUNTER
Radha Iraheta MD  [facility] Surgery Scheduling  Patient Name: Nory Woods   MRN: [id number]   Case#: [id number]   Surgeon(s) and Role:      * Radha Iraheta MD - Primary   Date requested: 12/28/2021   Location:  OR   Procedure(s):   DILATION AND CURETTAGE, UTERUS, USING SUCTION (N/A)     This case was generated via a case request order.             Case Information    Patient: Nory Woods [[id number]]   Case:  [id number]

## 2021-12-24 NOTE — TELEPHONE ENCOUNTER
PRE-OP DIAGNOSIS - MISSED AB O02.1    ERAS BAG GIVEN No  ERAS INSTRUCTIONS EXPLAINED N/A    ASSIST: NA    H&P TO BE COMPLETED BY:   Surgeon  FOR H&P TO BE DONE BY SURGEON   UPDATE VISIT ON DATE AT HOSPITAL  SAME DAY/OBSERVATION/INPATIENT: STRAIGHT SAME DAY  EQUIPMENT: ROUTINE  VENDOR NEEDED AT CASE: NA  IF IUD WHAT BRAND NA  LABS/SPECIAL INSTRUCTIONS: ROUTINE  TIME OFF WORK: 1-2 DAYS  ESTIMATED DOCTOR TIME NEEDED IN MINUTES 30  POST OP TO BE SCHEDULED AT 4-6 WEEKS WITH THE MIDWIVES

## 2021-12-24 NOTE — TELEPHONE ENCOUNTER
Type of surgery: DILATION AND CURETTAGE, UTERUS, USING SUCTION  Location of surgery: [facility]  Date and time of surgery: 12/28/2021   Surgeon: LAURIE  Pre-Op Appt Date: UPDATE AT [facility]  Post-Op Appt Date: 2/1/2022 ADITYA MEJIA  Packet sent out: EMAILED  Pre-cert/Authorization completed:  TBD  Date: 12/24/2021 SCHEDULED W/ EBONY    COVID TEST SCHEDULED FOR 12/26 @ 2:30 PM

## 2021-12-26 ENCOUNTER — LAB (OUTPATIENT)
Dept: URGENT CARE | Facility: URGENT CARE | Age: 31
End: 2021-12-26
Payer: COMMERCIAL

## 2021-12-26 DIAGNOSIS — Z11.59 ENCOUNTER FOR SCREENING FOR OTHER VIRAL DISEASES: ICD-10-CM

## 2021-12-26 PROCEDURE — U0005 INFEC AGEN DETEC AMPLI PROBE: HCPCS

## 2021-12-26 PROCEDURE — U0003 INFECTIOUS AGENT DETECTION BY NUCLEIC ACID (DNA OR RNA); SEVERE ACUTE RESPIRATORY SYNDROME CORONAVIRUS 2 (SARS-COV-2) (CORONAVIRUS DISEASE [COVID-19]), AMPLIFIED PROBE TECHNIQUE, MAKING USE OF HIGH THROUGHPUT TECHNOLOGIES AS DESCRIBED BY CMS-2020-01-R: HCPCS

## 2021-12-27 LAB — SARS-COV-2 RNA RESP QL NAA+PROBE: NEGATIVE

## 2021-12-28 ENCOUNTER — HOSPITAL ENCOUNTER (OUTPATIENT)
Facility: CLINIC | Age: 31
Discharge: HOME | End: 2021-12-28
Attending: OBSTETRICS & GYNECOLOGY | Admitting: OBSTETRICS & GYNECOLOGY
Payer: COMMERCIAL

## 2021-12-28 ENCOUNTER — ANESTHESIA (OUTPATIENT)
Dept: SURGERY | Facility: CLINIC | Age: 31
End: 2021-12-28
Payer: COMMERCIAL

## 2021-12-28 ENCOUNTER — ANESTHESIA EVENT (OUTPATIENT)
Dept: SURGERY | Facility: CLINIC | Age: 31
End: 2021-12-28
Payer: COMMERCIAL

## 2021-12-28 VITALS
WEIGHT: 137.7 LBS | SYSTOLIC BLOOD PRESSURE: 113 MMHG | OXYGEN SATURATION: 100 % | HEART RATE: 74 BPM | HEIGHT: 67 IN | DIASTOLIC BLOOD PRESSURE: 72 MMHG | TEMPERATURE: 97.5 F | BODY MASS INDEX: 21.61 KG/M2 | RESPIRATION RATE: 16 BRPM

## 2021-12-28 PROCEDURE — 272N000001 HC OR GENERAL SUPPLY STERILE: Performed by: OBSTETRICS & GYNECOLOGY

## 2021-12-28 PROCEDURE — 250N000009 HC RX 250: Performed by: OBSTETRICS & GYNECOLOGY

## 2021-12-28 PROCEDURE — 360N000075 HC SURGERY LEVEL 2, PER MIN: Performed by: OBSTETRICS & GYNECOLOGY

## 2021-12-28 PROCEDURE — 88305 TISSUE EXAM BY PATHOLOGIST: CPT | Mod: TC | Performed by: OBSTETRICS & GYNECOLOGY

## 2021-12-28 PROCEDURE — 710N000009 HC RECOVERY PHASE 1, LEVEL 1, PER MIN: Performed by: OBSTETRICS & GYNECOLOGY

## 2021-12-28 PROCEDURE — 59820 CARE OF MISCARRIAGE: CPT | Performed by: OBSTETRICS & GYNECOLOGY

## 2021-12-28 PROCEDURE — 370N000017 HC ANESTHESIA TECHNICAL FEE, PER MIN: Performed by: OBSTETRICS & GYNECOLOGY

## 2021-12-28 PROCEDURE — 250N000025 HC SEVOFLURANE, PER MIN: Performed by: OBSTETRICS & GYNECOLOGY

## 2021-12-28 PROCEDURE — 258N000003 HC RX IP 258 OP 636: Performed by: ANESTHESIOLOGY

## 2021-12-28 PROCEDURE — 250N000009 HC RX 250: Performed by: NURSE ANESTHETIST, CERTIFIED REGISTERED

## 2021-12-28 PROCEDURE — 250N000011 HC RX IP 250 OP 636: Performed by: NURSE ANESTHETIST, CERTIFIED REGISTERED

## 2021-12-28 PROCEDURE — 999N000141 HC STATISTIC PRE-PROCEDURE NURSING ASSESSMENT: Performed by: OBSTETRICS & GYNECOLOGY

## 2021-12-28 PROCEDURE — 710N000012 HC RECOVERY PHASE 2, PER MINUTE: Performed by: OBSTETRICS & GYNECOLOGY

## 2021-12-28 RX ORDER — FENTANYL CITRATE 50 UG/ML
25 INJECTION, SOLUTION INTRAMUSCULAR; INTRAVENOUS EVERY 5 MIN PRN
Status: DISCONTINUED | OUTPATIENT
Start: 2021-12-28 | End: 2021-12-28 | Stop reason: HOSPADM

## 2021-12-28 RX ORDER — PROPOFOL 10 MG/ML
INJECTION, EMULSION INTRAVENOUS CONTINUOUS PRN
Status: DISCONTINUED | OUTPATIENT
Start: 2021-12-28 | End: 2021-12-28

## 2021-12-28 RX ORDER — IBUPROFEN 400 MG/1
800 TABLET, FILM COATED ORAL ONCE
Status: DISCONTINUED | OUTPATIENT
Start: 2021-12-28 | End: 2021-12-28 | Stop reason: HOSPADM

## 2021-12-28 RX ORDER — ACETAMINOPHEN 325 MG/1
975 TABLET ORAL ONCE
Status: DISCONTINUED | OUTPATIENT
Start: 2021-12-28 | End: 2021-12-28 | Stop reason: HOSPADM

## 2021-12-28 RX ORDER — PROPOFOL 10 MG/ML
INJECTION, EMULSION INTRAVENOUS PRN
Status: DISCONTINUED | OUTPATIENT
Start: 2021-12-28 | End: 2021-12-28

## 2021-12-28 RX ORDER — ONDANSETRON 2 MG/ML
4 INJECTION INTRAMUSCULAR; INTRAVENOUS EVERY 30 MIN PRN
Status: DISCONTINUED | OUTPATIENT
Start: 2021-12-28 | End: 2021-12-28 | Stop reason: HOSPADM

## 2021-12-28 RX ORDER — SODIUM CHLORIDE, SODIUM LACTATE, POTASSIUM CHLORIDE, CALCIUM CHLORIDE 600; 310; 30; 20 MG/100ML; MG/100ML; MG/100ML; MG/100ML
INJECTION, SOLUTION INTRAVENOUS CONTINUOUS
Status: DISCONTINUED | OUTPATIENT
Start: 2021-12-28 | End: 2021-12-28 | Stop reason: HOSPADM

## 2021-12-28 RX ORDER — OXYCODONE HYDROCHLORIDE 5 MG/1
5 TABLET ORAL EVERY 4 HOURS PRN
Status: DISCONTINUED | OUTPATIENT
Start: 2021-12-28 | End: 2021-12-28 | Stop reason: HOSPADM

## 2021-12-28 RX ORDER — ONDANSETRON 4 MG/1
4 TABLET, ORALLY DISINTEGRATING ORAL EVERY 30 MIN PRN
Status: DISCONTINUED | OUTPATIENT
Start: 2021-12-28 | End: 2021-12-28 | Stop reason: HOSPADM

## 2021-12-28 RX ORDER — HYDROMORPHONE HCL IN WATER/PF 6 MG/30 ML
0.2 PATIENT CONTROLLED ANALGESIA SYRINGE INTRAVENOUS EVERY 5 MIN PRN
Status: DISCONTINUED | OUTPATIENT
Start: 2021-12-28 | End: 2021-12-28 | Stop reason: HOSPADM

## 2021-12-28 RX ORDER — DOXYCYCLINE 100 MG/10ML
100 INJECTION, POWDER, LYOPHILIZED, FOR SOLUTION INTRAVENOUS ONCE
Status: COMPLETED | OUTPATIENT
Start: 2021-12-28 | End: 2021-12-28

## 2021-12-28 RX ORDER — DEXAMETHASONE SODIUM PHOSPHATE 4 MG/ML
INJECTION, SOLUTION INTRA-ARTICULAR; INTRALESIONAL; INTRAMUSCULAR; INTRAVENOUS; SOFT TISSUE PRN
Status: DISCONTINUED | OUTPATIENT
Start: 2021-12-28 | End: 2021-12-28

## 2021-12-28 RX ORDER — ONDANSETRON 2 MG/ML
INJECTION INTRAMUSCULAR; INTRAVENOUS PRN
Status: DISCONTINUED | OUTPATIENT
Start: 2021-12-28 | End: 2021-12-28

## 2021-12-28 RX ORDER — LIDOCAINE HYDROCHLORIDE 20 MG/ML
INJECTION, SOLUTION INFILTRATION; PERINEURAL PRN
Status: DISCONTINUED | OUTPATIENT
Start: 2021-12-28 | End: 2021-12-28

## 2021-12-28 RX ORDER — FENTANYL CITRATE 50 UG/ML
INJECTION, SOLUTION INTRAMUSCULAR; INTRAVENOUS PRN
Status: DISCONTINUED | OUTPATIENT
Start: 2021-12-28 | End: 2021-12-28

## 2021-12-28 RX ORDER — KETOROLAC TROMETHAMINE 30 MG/ML
INJECTION, SOLUTION INTRAMUSCULAR; INTRAVENOUS PRN
Status: DISCONTINUED | OUTPATIENT
Start: 2021-12-28 | End: 2021-12-28

## 2021-12-28 RX ADMIN — PROPOFOL 200 MCG/KG/MIN: 10 INJECTION, EMULSION INTRAVENOUS at 13:06

## 2021-12-28 RX ADMIN — PROPOFOL 200 MG: 10 INJECTION, EMULSION INTRAVENOUS at 13:03

## 2021-12-28 RX ADMIN — ONDANSETRON 4 MG: 2 INJECTION INTRAMUSCULAR; INTRAVENOUS at 13:07

## 2021-12-28 RX ADMIN — KETOROLAC TROMETHAMINE 30 MG: 30 INJECTION, SOLUTION INTRAMUSCULAR at 13:18

## 2021-12-28 RX ADMIN — LIDOCAINE HYDROCHLORIDE 100 MG: 20 INJECTION, SOLUTION INFILTRATION; PERINEURAL at 13:03

## 2021-12-28 RX ADMIN — MIDAZOLAM 2 MG: 1 INJECTION INTRAMUSCULAR; INTRAVENOUS at 13:00

## 2021-12-28 RX ADMIN — SODIUM CHLORIDE, POTASSIUM CHLORIDE, SODIUM LACTATE AND CALCIUM CHLORIDE: 600; 310; 30; 20 INJECTION, SOLUTION INTRAVENOUS at 13:00

## 2021-12-28 RX ADMIN — DOXYCYCLINE 100 MG: 100 INJECTION, POWDER, LYOPHILIZED, FOR SOLUTION INTRAVENOUS at 13:07

## 2021-12-28 RX ADMIN — FENTANYL CITRATE 50 MCG: 50 INJECTION, SOLUTION INTRAMUSCULAR; INTRAVENOUS at 13:07

## 2021-12-28 RX ADMIN — DEXAMETHASONE SODIUM PHOSPHATE 4 MG: 4 INJECTION, SOLUTION INTRA-ARTICULAR; INTRALESIONAL; INTRAMUSCULAR; INTRAVENOUS; SOFT TISSUE at 13:07

## 2021-12-28 ASSESSMENT — MIFFLIN-ST. JEOR: SCORE: 1382.23

## 2021-12-28 NOTE — BRIEF OP NOTE
[facility]    Brief Operative Note    Pre-operative diagnosis: Missed  [O02.1]  Post-operative diagnosis Same as pre-operative diagnosis    Procedure: Procedure(s):  DILATION AND CURETTAGE, UTERUS, USING SUCTION  Surgeon: Surgeon(s) and Role:     * Radha Iraheta MD - Primary  Anesthesia: General   Estimated Blood Loss: Less than 50 ml    Drains: None  Specimens:   ID Type Source Tests Collected by Time Destination   1 : products of conception Products of Conception Products of Conception SURGICAL PATHOLOGY EXAM Radha Iraheta MD 2021  1:14 PM      Findings:   normal retroverted uterus sounding to 8,5cm. easy dilation to #8 hegar. #8 curved curette used to suciton out the uterus of appropriate contents. had some initial brisk bleeding so ordered TXA and then just did a uterine bimanual massage adn all resolved so TXA not given.  Complications: None.  Implants: * No implants in log *

## 2021-12-28 NOTE — ANESTHESIA PREPROCEDURE EVALUATION
Anesthesia Pre-Procedure Evaluation    Patient: Nory Woods   MRN: [id number] : 1990        Preoperative Diagnosis: Missed  [O02.1]    Procedure : Procedure(s):  DILATION AND CURETTAGE, UTERUS, USING SUCTION          Past Medical History:   Diagnosis Date     Depressive disorder      History of PCOS      Hypothyroidism      Mild depression (H)      Supervision of normal first pregnancy 2021      Past Surgical History:   Procedure Laterality Date     WISDOM TOOTH EXTRACTION        No Known Allergies   Social History     Tobacco Use     Smoking status: Never Smoker     Smokeless tobacco: Never Used   Substance Use Topics     Alcohol use: Not Currently      Wt Readings from Last 1 Encounters:   21 62.5 kg (137 lb 11.2 oz)        Anesthesia Evaluation            ROS/MED HX  ENT/Pulmonary:  - neg pulmonary ROS     Neurologic:  - neg neurologic ROS     Cardiovascular:  - neg cardiovascular ROS     METS/Exercise Tolerance:     Hematologic:  - neg hematologic  ROS     Musculoskeletal:  - neg musculoskeletal ROS     GI/Hepatic:  - neg GI/hepatic ROS     Renal/Genitourinary: Comment: Missed AB      Endo:     (+) thyroid problem, hypothyroidism,     Psychiatric/Substance Use:     (+) psychiatric history depression     Infectious Disease:  - neg infectious disease ROS     Malignancy:  - neg malignancy ROS     Other:  - neg other ROS          Physical Exam    Airway        Mallampati: II   TM distance: > 3 FB   Neck ROM: full   Mouth opening: > 3 cm    Respiratory Devices and Support         Dental  no notable dental history         Cardiovascular   cardiovascular exam normal       Rhythm and rate: regular     Pulmonary   pulmonary exam normal        breath sounds clear to auscultation           OUTSIDE LABS:  CBC:   Lab Results   Component Value Date    WBC 4.7 12/10/2021    HGB 13.4 12/10/2021    HCT 40.6 12/10/2021     12/10/2021     BMP: No results found for: NA,  POTASSIUM, CHLORIDE, CO2, BUN, CR, GLC  COAGS: No results found for: PTT, INR, FIBR  POC: No results found for: BGM, HCG, HCGS  HEPATIC: No results found for: ALBUMIN, PROTTOTAL, ALT, AST, GGT, ALKPHOS, BILITOTAL, BILIDIRECT, RENE  OTHER:   Lab Results   Component Value Date    TSH 1.32 12/10/2021       Anesthesia Plan    ASA Status:  2   NPO Status:  NPO Appropriate    Anesthesia Type: General.     - Airway: LMA   Induction: Intravenous.   Maintenance: Balanced.        Consents    Anesthesia Plan(s) and associated risks, benefits, and realistic alternatives discussed. Questions answered and patient/representative(s) expressed understanding.    - Discussed:     - Discussed with:  Patient      - Extended Intubation/Ventilatory Support Discussed: No.      - Patient is DNR/DNI Status: No    Use of blood products discussed: No .     Postoperative Care    Pain management: Multi-modal analgesia.   PONV prophylaxis: Ondansetron (or other 5HT-3), Dexamethasone or Solumedrol     Comments:    Other Comments: Patient is counseled on the anesthesia plan and relevant anesthesia procedures including all risks and benefits. All patient questions were answered.             Calos Lynch MD

## 2021-12-28 NOTE — ANESTHESIA CARE TRANSFER NOTE
Patient: Nory Woods    Procedure: Procedure(s):  DILATION AND CURETTAGE, UTERUS, USING SUCTION       Diagnosis: Missed  [O02.1]  Diagnosis Additional Information: No value filed.    Anesthesia Type:   General     Note:    Oropharynx: oropharynx clear of all foreign objects and spontaneously breathing  Level of Consciousness: awake  Oxygen Supplementation: face mask  Level of Supplemental Oxygen (L/min / FiO2): 8  Independent Airway: airway patency satisfactory and stable  Dentition: dentition unchanged  Vital Signs Stable: post-procedure vital signs reviewed and stable  Report to RN Given: handoff report given  Patient transferred to: PACU    Handoff Report: Identifed the Patient, Identified the Reponsible Provider, Reviewed the pertinent medical history, Discussed the surgical course, Reviewed Intra-OP anesthesia mangement and issues during anesthesia, Set expectations for post-procedure period and Allowed opportunity for questions and acknowledgement of understanding      Vitals:  Vitals Value Taken Time   /69 21 1336   Temp     Pulse 87 21 1338   Resp 14 21 1338   SpO2 99 % 21 1338   Vitals shown include unvalidated device data.    Electronically Signed By: MAHAD Coronado CRNA  2021  1:39 PM

## 2021-12-28 NOTE — ANESTHESIA POSTPROCEDURE EVALUATION
Patient: Nory Woods    Procedure: Procedure(s):  DILATION AND CURETTAGE, UTERUS, USING SUCTION       Diagnosis:Missed  [O02.1]  Diagnosis Additional Information: No value filed.    Anesthesia Type:  General    Note:  Disposition: Outpatient   Postop Pain Control: Uneventful            Sign Out: Well controlled pain   PONV:    Neuro/Psych: Uneventful            Sign Out: Acceptable/Baseline neuro status   Airway/Respiratory: Uneventful            Sign Out: Acceptable/Baseline resp. status   CV/Hemodynamics: Uneventful            Sign Out: Acceptable CV status   Other NRE: NONE   DID A NON-ROUTINE EVENT OCCUR? No           Last vitals:  Vitals Value Taken Time   /72 21 1415   Temp 36.4  C (97.5  F) 21 1400   Pulse 80 21 1429   Resp 11 21 1429   SpO2 100 % 21 1426   Vitals shown include unvalidated device data.    Electronically Signed By: Calos Lynch MD  2021  3:05 PM

## 2021-12-28 NOTE — ANESTHESIA PROCEDURE NOTES
Airway       Patient location during procedure: OR  Staff -        Anesthesiologist:  Calos Lynch MD       CRNA: Nancy Womack APRN CRNA       Performed By: CRNA  Consent for Airway        Urgency: elective  Indications and Patient Condition       Indications for airway management: hunetr-procedural       Induction type:intravenous       Mask difficulty assessment: 1 - vent by mask    Final Airway Details       Final airway type: supraglottic airway    Supraglottic Airway Details        Type: LMA       LMA size: 4    Post intubation assessment        Placement verified by: capnometry, equal breath sounds and chest rise        Number of attempts at approach: 1       Ease of procedure: easy       Dentition: Intact and Unchanged

## 2021-12-28 NOTE — OP NOTE
Radha Iraheta MD   Physician   OB/Gyn   Op Note                                    Procedure Date: 2021     PREOPERATIVE DIAGNOSES:  Missed miscarriage at 10 weeks by sure last menstrual period measuring 8+2 weeks.     POSTOPERATIVE DIAGNOSIS:  Missed miscarriage at 10 weeks by sure last menstrual period measuring 8+2 weeks.     PROCEDURE:  Suction, dilation and curettage.     SURGEON:  Radha Iraheta MD.     ANESTHESIA:  General via LMA.     ESTIMATED BLOOD LOSS:  30 mL.     SPECIMENS:  Products of conception.     OPERATIVE INDICATIONS:  Nory is a 32 yo  who was seeing the CNM for her prenatal care. Came in for an initial viability U/S showing lag in dates. The presented again last week and was 10+0 by sure LMP and DOC but measuring 8+2 without fetal heart beat. Allyson Iqbal [facility] discussed with the patient waiting for spontaneous miscarriage, Cytotec induced miscarriage, or definitive surgical management with D and C, including all the risks and benefits of each, and the patient very much wanted to proceed with definitive surgical management. She was referred to me for surgery and on the morning of the procedure I reviewed  the procedure again. Lab testing and COVID testing were obtained.  The patient was COVID negative and she is Rh positive.     FINDINGS:  The patient had a normal retroverted uterus  The  cervix dilated quite easily up to a #8 Hegar dilator.  It did sound to 8.5 cm as well.  A #8 curved curette was used to suction out the uterine contents in their entirety.     DESCRIPTION OF PROCEDURE:  The patient was taken to the operating room where general anesthesia was obtained.  The patient was placed in dorsal lithotomy position and prepped and draped in the normal fashion.  A timeout was undertaken, and a speculum was placed.  The anterior lip of the cervix was grasped with a tenaculum and the uterus was sounded.  The cervix was then sequentially dilated up to a #8 Hegar dilator.  A #8  curved curette was then used and placed through the cervix into the endometrial cavity.  Several passes of suction curettage were done, removing clear tissue as well as some blood. There was one point where there was a fairly quick and brisk bleeding and TXA was ordered. However while waiting for it, removed the speculum and did a bimanual massage and the bleeding decreased so the TXA was not given. One final pass of the suction was done with minimal/appropriate blood removed.   At this point, the suction was removed and the tenaculum was removed as well.  The tenaculum sites were hemostatic. At this point, the speculum was removed and the patient was replaced to dorsal supine position.     DRAINS:  None.     DISPOSITION:  The patient was taken to the postanesthesia recovery room in stable condition and will follow up in 4-5 weeks' time.     INTRAOPERATIVE MEDICATIONS:doxycycline 100 mg IV x1   Radha Iraheta MD

## 2021-12-28 NOTE — DISCHARGE INSTRUCTIONS
Same Day Surgery Discharge Instructions for  Sedation and General Anesthesia       It's not unusual to feel dizzy, light-headed or faint for up to 24 hours after surgery or while taking pain medication.  If you have these symptoms: sit for a few minutes before standing and have someone assist you when you get up to walk or use the bathroom.      You should rest and relax for the next 24 hours. We recommend you make arrangements to have an adult stay with you for at least 24 hours after your discharge.  Avoid hazardous and strenuous activity.      DO NOT DRIVE any vehicle or operate mechanical equipment for 24 hours following the end of your surgery.  Even though you may feel normal, your reactions may be affected by the medication you have received.      Do not drink alcoholic beverages for 24 hours following surgery.       Slowly progress to your regular diet as you feel able. It's not unusual to feel nauseated and/or vomit after receiving anesthesia.  If you develop these symptoms, drink clear liquids (apple juice, ginger ale, broth, 7-up, etc. ) until you feel better.  If your nausea and vomiting persists for 24 hours, please notify your surgeon.        All narcotic pain medications, along with inactivity and anesthesia, can cause constipation. Drinking plenty of liquids and increasing fiber intake will help.      For any questions of a medical nature, call your surgeon.      Do not make important decisions for 24 hours.      If you had general anesthesia, you may have a sore throat for a couple of days related to the breathing tube used during surgery.  You may use Cepacol lozenges to help with this discomfort.  If it worsens or if you develop a fever, contact your surgeon.       If you feel your pain is not well managed with the pain medications prescribed by your surgeon, please contact your surgeon's office to let them know so they can address your concerns.       CoVid 19 Information    We want to give you  information regarding Covid. Please consult your primary care provider with any questions you might have.     Patient who have symptoms (cough, fever, or shortness of breath), need to isolate for 7 days from when symptoms started OR 72 hours after fever resolves (without fever reducing medications) AND improvement of respiratory symptoms (whichever is longer).      Isolate yourself at home (in own room/own bathroom if possible)    Do Not allow any visitors    Do Not go to work or school    Do Not go to Muslim,  centers, shopping, or other public places.    Do Not shake hands.    Avoid close and intimate contact with others (hugging, kissing).    Follow CDC recommendations for household cleaning of frequently touched services.     After the initial 7 days, continue to isolate yourself from household members as much as possible. To continue decrease the risk of community spread and exposure, you and any members of your household should limit activities in public for 14 days after starting home isolation.     You can reference the following CDC link for helpful home isolation/care tips:  https://www.cdc.gov/coronavirus/2019-ncov/downloads/10Things.pdf    Protect Others:    Cover Your Mouth and Nose with a mask, disposable tissue or wash cloth to avoid spreading germs to others.    Wash your hands and face frequently with soap and water    Call Your Primary Doctor If: Breathing difficulty develops or you become worse.    For more information about COVID19 and options for caring for yourself at home, please visit the CDC website at https://www.cdc.gov/coronavirus/2019-ncov/about/steps-when-sick.html  For more options for care at [facility], please visit our website at [email or web address]      Today you received Toradol, an antiinflammatory medication similar to Ibuprofen.  You should not take other antiinflammatory medication, such as Ibuprofen, Motrin, Advil, Aleve,  Naprosyn, etc until 7:20 PM.       POST-OPERATIVE INSTRUCTIONS FOLLOWING  D & C     ACTIVITY:  You may resume normal activities including lifting as needed.  It is permissible to drive a car and to climb stairs.  Baths or showers are perfectly acceptable.      CHECK-UP:  You should be seen 1 month after discharge unless home instruction sheet states otherwise and please phone the office the day after procedure and schedule an appointment with your physician.    VAGINAL DISCHARGE:  You may have some vaginal bleeding or discharge for about a week after discharge.  You should avoid douches, tampons, and intercourse for the first week.    TEMPERATURE:  If you develop temperature levels to over 100.4 F your physician should be called immediately.    DIET:  [place] or light diet is advisable the day of surgery.  If nausea persists, continue this diet.  If severe, call.    [facility] for [facility]  [phone]      ____________________________________________    Our hearts go out to you at this difficult time. Be assured that there is no right way to find comfort and support. It may take time to find out what works for you.  Please do not hesitate to ask for support from our nurses, social workers, or chaplains.  After you leave the hospital, if you need help finding support resources, please call [phone].  [facility] hosts a support group for anyone who has had a pregnancy loss providing a supportive place to learn and share. Couples are encouraged to attend together.     Where: [facility], [facility], [facility]  When: 1st and 3rd Thursday of each month, 5:00pm - 6:30pm  To register, contact:    Osito *[phone] *[email or web address]   Tammi *[phone] *[email or web address]    ______________________________________________         **If you have questions or concerns about your procedure,  call Dr. Iraheta at [phone]**

## 2021-12-29 LAB
PATH REPORT.COMMENTS IMP SPEC: NORMAL
PATH REPORT.COMMENTS IMP SPEC: NORMAL
PATH REPORT.FINAL DX SPEC: NORMAL
PATH REPORT.GROSS SPEC: NORMAL
PATH REPORT.MICROSCOPIC SPEC OTHER STN: NORMAL
PATH REPORT.RELEVANT HX SPEC: NORMAL
PHOTO IMAGE: NORMAL

## 2021-12-29 PROCEDURE — 88305 TISSUE EXAM BY PATHOLOGIST: CPT | Mod: 26 | Performed by: PATHOLOGY

## 2021-12-30 ENCOUNTER — NURSE TRIAGE (OUTPATIENT)
Dept: NURSING | Facility: CLINIC | Age: 31
End: 2021-12-30
Payer: COMMERCIAL

## 2021-12-30 PROCEDURE — 99284 EMERGENCY DEPT VISIT MOD MDM: CPT | Mod: 25

## 2021-12-30 PROCEDURE — C9803 HOPD COVID-19 SPEC COLLECT: HCPCS

## 2021-12-30 PROCEDURE — 99285 EMERGENCY DEPT VISIT HI MDM: CPT | Mod: 25

## 2021-12-30 NOTE — RESULT ENCOUNTER NOTE
Nory,    Everything went fine with your procedure on Tuesday and no issues or complications at all. The pathology report is back and consistent with what we expect to see with a miscarriage.  Hopefully you're recovering well and I hope to see and talk to you soon for happier times!    AJ

## 2021-12-31 ENCOUNTER — APPOINTMENT (OUTPATIENT)
Dept: ULTRASOUND IMAGING | Facility: CLINIC | Age: 31
End: 2021-12-31
Attending: EMERGENCY MEDICINE
Payer: COMMERCIAL

## 2021-12-31 ENCOUNTER — HOSPITAL ENCOUNTER (EMERGENCY)
Facility: CLINIC | Age: 31
Discharge: HOME | End: 2021-12-31
Attending: EMERGENCY MEDICINE | Admitting: EMERGENCY MEDICINE
Payer: COMMERCIAL

## 2021-12-31 ENCOUNTER — TELEPHONE (OUTPATIENT)
Dept: OBGYN | Facility: CLINIC | Age: 31
End: 2021-12-31

## 2021-12-31 VITALS
RESPIRATION RATE: 16 BRPM | SYSTOLIC BLOOD PRESSURE: 104 MMHG | TEMPERATURE: 100.1 F | HEART RATE: 77 BPM | DIASTOLIC BLOOD PRESSURE: 69 MMHG | WEIGHT: 137 LBS | OXYGEN SATURATION: 97 % | BODY MASS INDEX: 21.46 KG/M2

## 2021-12-31 DIAGNOSIS — R01.1 HEART MURMUR: ICD-10-CM

## 2021-12-31 DIAGNOSIS — N71.9 ENDOMETRITIS: Primary | ICD-10-CM

## 2021-12-31 DIAGNOSIS — R10.2 SUPRAPUBIC ABDOMINAL PAIN: ICD-10-CM

## 2021-12-31 DIAGNOSIS — R50.9 FEVER IN ADULT: ICD-10-CM

## 2021-12-31 LAB
ALBUMIN SERPL-MCNC: 4 G/DL (ref 3.4–5)
ALBUMIN UR-MCNC: NEGATIVE MG/DL
ALP SERPL-CCNC: 67 U/L (ref 40–150)
ALT SERPL W P-5'-P-CCNC: 29 U/L (ref 0–50)
ANION GAP SERPL CALCULATED.3IONS-SCNC: 5 MMOL/L (ref 3–14)
APPEARANCE UR: CLEAR
AST SERPL W P-5'-P-CCNC: 14 U/L (ref 0–45)
B-HCG SERPL-ACNC: 1046 IU/L (ref 0–5)
BASOPHILS # BLD AUTO: 0 10E3/UL (ref 0–0.2)
BASOPHILS NFR BLD AUTO: 0 %
BILIRUB SERPL-MCNC: 0.4 MG/DL (ref 0.2–1.3)
BILIRUB UR QL STRIP: NEGATIVE
BUN SERPL-MCNC: 9 MG/DL (ref 7–30)
CALCIUM SERPL-MCNC: 9.1 MG/DL (ref 8.5–10.1)
CHLORIDE BLD-SCNC: 107 MMOL/L (ref 94–109)
CO2 SERPL-SCNC: 25 MMOL/L (ref 20–32)
COLOR UR AUTO: ABNORMAL
CREAT SERPL-MCNC: 0.57 MG/DL (ref 0.52–1.04)
EOSINOPHIL # BLD AUTO: 0 10E3/UL (ref 0–0.7)
EOSINOPHIL NFR BLD AUTO: 0 %
ERYTHROCYTE [DISTWIDTH] IN BLOOD BY AUTOMATED COUNT: 11.9 % (ref 10–15)
FLUAV RNA SPEC QL NAA+PROBE: NEGATIVE
FLUBV RNA RESP QL NAA+PROBE: NEGATIVE
GFR SERPL CREATININE-BSD FRML MDRD: >90 ML/MIN/1.73M2
GLUCOSE BLD-MCNC: 122 MG/DL (ref 70–99)
GLUCOSE UR STRIP-MCNC: NEGATIVE MG/DL
HCT VFR BLD AUTO: 40 % (ref 35–47)
HGB BLD-MCNC: 13.3 G/DL (ref 11.7–15.7)
HGB UR QL STRIP: ABNORMAL
IMM GRANULOCYTES # BLD: 0 10E3/UL
IMM GRANULOCYTES NFR BLD: 0 %
KETONES UR STRIP-MCNC: NEGATIVE MG/DL
LACTATE SERPL-SCNC: 1.2 MMOL/L (ref 0.7–2)
LEUKOCYTE ESTERASE UR QL STRIP: NEGATIVE
LYMPHOCYTES # BLD AUTO: 0.5 10E3/UL (ref 0.8–5.3)
LYMPHOCYTES NFR BLD AUTO: 7 %
MCH RBC QN AUTO: 28.3 PG (ref 26.5–33)
MCHC RBC AUTO-ENTMCNC: 33.3 G/DL (ref 31.5–36.5)
MCV RBC AUTO: 85 FL (ref 78–100)
MONOCYTES # BLD AUTO: 0.6 10E3/UL (ref 0–1.3)
MONOCYTES NFR BLD AUTO: 7 %
MUCOUS THREADS #/AREA URNS LPF: PRESENT /LPF
NEUTROPHILS # BLD AUTO: 6.5 10E3/UL (ref 1.6–8.3)
NEUTROPHILS NFR BLD AUTO: 86 %
NITRATE UR QL: NEGATIVE
NRBC # BLD AUTO: 0 10E3/UL
NRBC BLD AUTO-RTO: 0 /100
PH UR STRIP: 6 [PH] (ref 5–7)
PLATELET # BLD AUTO: 202 10E3/UL (ref 150–450)
POTASSIUM BLD-SCNC: 3.7 MMOL/L (ref 3.4–5.3)
PROT SERPL-MCNC: 7.3 G/DL (ref 6.8–8.8)
RBC # BLD AUTO: 4.7 10E6/UL (ref 3.8–5.2)
RBC URINE: 1 /HPF
SARS-COV-2 RNA RESP QL NAA+PROBE: NEGATIVE
SODIUM SERPL-SCNC: 137 MMOL/L (ref 133–144)
SP GR UR STRIP: 1.01 (ref 1–1.03)
SQUAMOUS EPITHELIAL: <1 /HPF
UROBILINOGEN UR STRIP-MCNC: NORMAL MG/DL
WBC # BLD AUTO: 7.6 10E3/UL (ref 4–11)
WBC URINE: <1 /HPF

## 2021-12-31 PROCEDURE — 85004 AUTOMATED DIFF WBC COUNT: CPT | Performed by: EMERGENCY MEDICINE

## 2021-12-31 PROCEDURE — 76856 US EXAM PELVIC COMPLETE: CPT

## 2021-12-31 PROCEDURE — 36415 COLL VENOUS BLD VENIPUNCTURE: CPT | Performed by: EMERGENCY MEDICINE

## 2021-12-31 PROCEDURE — 81001 URINALYSIS AUTO W/SCOPE: CPT | Performed by: EMERGENCY MEDICINE

## 2021-12-31 PROCEDURE — 83605 ASSAY OF LACTIC ACID: CPT | Performed by: EMERGENCY MEDICINE

## 2021-12-31 PROCEDURE — 258N000003 HC RX IP 258 OP 636: Performed by: EMERGENCY MEDICINE

## 2021-12-31 PROCEDURE — 250N000013 HC RX MED GY IP 250 OP 250 PS 637: Performed by: EMERGENCY MEDICINE

## 2021-12-31 PROCEDURE — 80053 COMPREHEN METABOLIC PANEL: CPT | Performed by: EMERGENCY MEDICINE

## 2021-12-31 PROCEDURE — 250N000011 HC RX IP 250 OP 636: Performed by: EMERGENCY MEDICINE

## 2021-12-31 PROCEDURE — 84702 CHORIONIC GONADOTROPIN TEST: CPT | Performed by: EMERGENCY MEDICINE

## 2021-12-31 PROCEDURE — 87040 BLOOD CULTURE FOR BACTERIA: CPT | Performed by: EMERGENCY MEDICINE

## 2021-12-31 PROCEDURE — 87636 SARSCOV2 & INF A&B AMP PRB: CPT | Performed by: EMERGENCY MEDICINE

## 2021-12-31 PROCEDURE — 96375 TX/PRO/DX INJ NEW DRUG ADDON: CPT

## 2021-12-31 PROCEDURE — 76857 US EXAM PELVIC LIMITED: CPT | Mod: XS

## 2021-12-31 PROCEDURE — 96374 THER/PROPH/DIAG INJ IV PUSH: CPT

## 2021-12-31 RX ORDER — DOXYCYCLINE 100 MG/1
100 CAPSULE ORAL 2 TIMES DAILY
Qty: 14 CAPSULE | Refills: 0 | Status: SHIPPED | OUTPATIENT
Start: 2021-12-31 | End: 2022-01-07

## 2021-12-31 RX ORDER — DOXYCYCLINE 100 MG/1
100 CAPSULE ORAL ONCE
Status: COMPLETED | OUTPATIENT
Start: 2021-12-31 | End: 2021-12-31

## 2021-12-31 RX ORDER — CEFTRIAXONE 1 G/1
1 INJECTION, POWDER, FOR SOLUTION INTRAMUSCULAR; INTRAVENOUS ONCE
Status: COMPLETED | OUTPATIENT
Start: 2021-12-31 | End: 2021-12-31

## 2021-12-31 RX ORDER — ACETAMINOPHEN 500 MG
1000 TABLET ORAL ONCE
Status: COMPLETED | OUTPATIENT
Start: 2021-12-31 | End: 2021-12-31

## 2021-12-31 RX ORDER — ONDANSETRON 2 MG/ML
4 INJECTION INTRAMUSCULAR; INTRAVENOUS ONCE
Status: COMPLETED | OUTPATIENT
Start: 2021-12-31 | End: 2021-12-31

## 2021-12-31 RX ADMIN — ACETAMINOPHEN 1000 MG: 500 TABLET, FILM COATED ORAL at 01:26

## 2021-12-31 RX ADMIN — SODIUM CHLORIDE 1000 ML: 9 INJECTION, SOLUTION INTRAVENOUS at 02:05

## 2021-12-31 RX ADMIN — DOXYCYCLINE 100 MG: 100 CAPSULE ORAL at 05:23

## 2021-12-31 RX ADMIN — ONDANSETRON 4 MG: 2 INJECTION INTRAMUSCULAR; INTRAVENOUS at 02:08

## 2021-12-31 RX ADMIN — CEFTRIAXONE SODIUM 1 G: 1 INJECTION, POWDER, FOR SOLUTION INTRAMUSCULAR; INTRAVENOUS at 05:23

## 2021-12-31 ASSESSMENT — ENCOUNTER SYMPTOMS
NAUSEA: 1
FEVER: 1
VOMITING: 0
DYSURIA: 0
ABDOMINAL DISTENTION: 1
HEADACHES: 1

## 2021-12-31 NOTE — TELEPHONE ENCOUNTER
Pt had D&C procedure on Tuesday, had fever and infection. Pt feels better with antibiotics and fever broke this morning, but is still bleeding slightly.  Pt would like to know what symptoms she should look out for and any restrictions going into the weekend. Please call patient back, she is anxious

## 2021-12-31 NOTE — TELEPHONE ENCOUNTER
Triage Call: DNC on Tuesday. Now having the chills. Temp: 99.6, oral. Headache - 6/10, started 3 hours ago, Advil is not helping. Took 3 Advil 200mg.Bleeding - when she goes to the bathroom, sometimes turned the water red. Some dizziness yesterday. Abdominal pain - coming and going but has been present for the last 2 hours, 5-6/10 today. Feeling a little nauseated. No foul smelling discharge.     Paging on call Midwife Kevin Kruger via smart web at 10:22pm.  Talked to midwife, per Midwife page on call OB/GYN Dr Martha Mclean.    Paging on call Dr Martha Mclean via smart web at 10:26pm. Per MD - patient should be seen in the ED.    Patient was called back and given Dr Mclean advisement. Patient stated understanding and will go into the ED now.     COVID 19 Nurse Triage Plan/Patient Instructions    Please be aware that novel coronavirus (COVID-19) may be circulating in the community. If you develop symptoms such as fever, cough, or SOB or if you have concerns about the presence of another infection including coronavirus (COVID-19), please contact your health care provider or visit [email or web address].     Disposition/Instructions    ED Visit recommended. Follow protocol based instructions.     Bring Your Own Device:  Please also bring your smart device(s) (smart phones, tablets, laptops) and their charging cables for your personal use and to communicate with your care team during your visit.    Thank you for taking steps to prevent the spread of this virus.  o Limit your contact with others.  o Wear a simple mask to cover your cough.  o Wash your hands well and often.    Resources    [facility]: About COVID-19: [email or web address]    CDC: What to Do If You're Sick: www.cdc.gov/coronavirus/2019-ncov/about/steps-when-sick.html    CDC: Ending Home Isolation: www.cdc.gov/coronavirus/2019-ncov/hcp/disposition-in-home-patients.html     CDC: Caring for Someone:  "www.cdc.gov/coronavirus/2019-ncov/if-you-are-sick/care-for-someone.html     [facility]: Interim Guidance for Hospital Discharge to Home: www.health.[facility].[place]/diseases/coronavirus/hcp/hospdischarge.pdf    [facility] clinical trials (COVID-19 research studies): clinicalaffairs.[email or web address]-clinical-trials     Below are the COVID-19 hotlines at the [place] Department of Health ([facility]). Interpreters are available.   o For health questions: Call [phone] or 1-696.759.2479 (7 a.m. to 7 p.m.)  o For questions about schools and childcare: Call 846-284-4414 or 1-866.427.3485 (7 a.m. to 7 p.m.)     Julia Marcial RN Nursing Advisor 12/30/2021 10:44 PM     Reason for Disposition    [1] Abdominal pain AND [2] postpartum (from 0 to 6 weeks after delivery)    [1] Constant abdominal pain AND [2] present > 2 hours    Additional Information    Negative: Difficult to awaken or acting confused (e.g., disoriented, slurred speech)    Negative: [1] Weakness of the face, arm or leg on one side of the body AND [2] new onset    Negative: [1] Numbness of the face, arm or leg on one side of the body AND [2] new onset    Negative: [1] Loss of speech or garbled speech AND [2] new onset    Negative: Passed out (i.e., lost consciousness, collapsed and was not responding)    Negative: Sounds like a life-threatening emergency to the triager    Negative: Followed a head injury    Negative: Pregnant    Negative: Postpartum (from 0 to 6 weeks after delivery)    Negative: Traumatic Brain Injury (TBI) is suspected    Negative: Unable to walk, or can only walk with assistance (e.g., requires support)    Negative: Stiff neck (can't touch chin to chest)    Negative: Severe pain in one eye    Negative: [1] Other family members (or roommates) with headaches AND [2] possibility of carbon monoxide exposure    Negative: [1] SEVERE headache (e.g., excruciating) AND [2] \"worst headache\" of life    Negative: [1] SEVERE headache AND [2] sudden-onset " "(i.e., reaching maximum intensity within seconds)    Negative: [1] SEVERE headache AND [2] fever    Negative: Loss of vision or double vision (Exception: same as prior migraines)    Negative: [1] Fever > 100.0 F (37.8 C) AND [2] diabetes mellitus or weak immune system (e.g., HIV positive, cancer chemo, splenectomy, organ transplant, chronic steroids)    Negative: Patient sounds very sick or weak to the triager    Negative: [1] SEVERE headache (e.g., excruciating) AND [2] not improved after 2 hours of pain medicine    Negative: [1] Vomiting AND [2] 2 or more times (Exception: similar to previous migraines)    Negative: Fever > 104 F (40 C)    Negative: Shock suspected (e.g., cold/pale/clammy skin, too weak to stand, low BP, rapid pulse)    Negative: Difficult to awaken or acting confused (e.g., disoriented, slurred speech)    Negative: Passed out (i.e., lost consciousness, collapsed and was not responding)    Negative: Sounds like a life-threatening emergency to the triager    Negative: Passed out (i.e., lost consciousness, collapsed and was not responding)    Negative: Shock suspected (e.g., cold/pale/clammy skin, too weak to stand, low BP, rapid pulse)    Negative: Difficult to awaken or acting confused (e.g., disoriented, slurred speech)    Negative: Sounds like a life-threatening emergency to the triager    Negative: Followed an abdomen (stomach) injury    Negative: Pain is mainly in upper abdomen  (if needed ask: \"is it mainly above the belly button?\")    Negative:  incision pain is main symptom    Negative: > 1 month since delivery    Negative: [1] SEVERE abdominal pain AND [2] lasts > 1 hour    Negative: [1] Vomiting AND [2] contains black (\"coffee ground\") material    Negative: [1] Vomiting AND [2] contains red blood      (Exception: few streaks and only occurred once)    Negative: Fever > 100.4 F (38.0 C)    Negative: [1] Strong urge to urinate BUT [2] can't pass urine for > 4 hours (or can only " pass few drops)    Negative: Blood in bowel movements      (Exception: small streak of blood on surface of BM with constipation)    Negative: Black or tarry bowel movements  (Exception: chronic-unchanged  black-grey bowel movements AND is taking iron pills or Pepto-bismol)    Negative: Patient sounds very sick or weak to the triager    Negative: Foul smelling vaginal discharge (i.e., lochia)    Protocols used: ABDOMINAL PAIN - FEMALE-A-AH, POSTPARTUM - ABDOMINAL PAIN-A-AH, HEADACHE-A-AH

## 2021-12-31 NOTE — TELEPHONE ENCOUNTER
, 31-yr old  21: D&C  21:  ER for endometritis     Returned patient's call--concerned about bleeding.  Bleeding had stopped while patient was in the ED, but now is back. Nothing notable on her pad, but does not small amount of blood in the toilet after every time she goes.  Also notes mild cramping, patient has been afebrile since leaving the ED.    Patient was emotional on the phone and we discussed both normal findings after a D&C and when she needs to seek further medical treatment.    Light bleeding and mild cramping is normal for the first 10-14 hours. Please call back if fever returns, you note heavy vaginal bleeding--meaning you are filling a pad in less that an hour for 1-2 hours, and/or severe abdominal pain not relieved by tylenol or ibuprofen.    Pt verbalized understanding, in agreement with plan, and voiced no further questions.  Jyothi Solis RN on 2021 at 2:53 PM

## 2021-12-31 NOTE — ED PROVIDER NOTES
History   Chief Complaint:  Post-op Problem (pt reports D&C on tuesday - increased cramping, headache and low grade fevers. Was told to come in by OB for eval. ibuprofen taken at . )     SHEKHAR Woods is a 31 year old female with history of PCOS and hypothyroidism who presents with cramping and fever. The patient reports a D&C 3 days ago at [facility] after  at 8 weeks. She states after returning home she felt fine for the first 18 hours. She then developed some vaginal bleeding and cramping last night. She then developed a headache and low grade fever tonight. She reports current nausea, bloating and abdominal cramping. She last took Advil at . She denies rash, vomiting or dysuria. She is vaccinated against Covid-19.     Review of Systems   Constitutional: Positive for fever.   Gastrointestinal: Positive for abdominal distention and nausea. Negative for vomiting.   Genitourinary: Positive for vaginal bleeding. Negative for dysuria.   Skin: Negative for rash.   Neurological: Positive for headaches.   All other systems reviewed and are negative.    Allergies:  No Known Drug Allergies    Medications:  Levothyroxine    Past Medical History:     Depressive disorder  Polycystic ovary syndrome  Hypothyroidism    Past Surgical History:    Dilation and curettage suction  [place] tooth extraction    Family History:    Mother- endometriosis    Social History:  Presents with her       Physical Exam     Patient Vitals for the past 24 hrs:   BP Temp Temp src Pulse Resp SpO2 Weight   21 0530 104/69 -- -- 77 -- 97 % --   21 0415 -- -- -- -- -- 97 % --   21 0400 107/64 -- -- 87 -- 96 % --   21 0330 105/63 -- -- 80 -- 96 % --   21 0315 -- -- -- -- -- 96 % --   21 0300 101/57 -- -- 78 -- 96 % --   21 0251 107/59 -- -- 88 -- -- --   21 0039 131/85 100.1  F (37.8  C) Temporal 100 16 98 % 62.1 kg (137 lb)     Physical  Exam  General: Alert, appears well-developed and well-nourished. Cooperative.     In mild distress  HEENT:  Head:  Atraumatic  Ears:  External ears are normal  Mouth/Throat:  Oropharynx is without erythema or exudate and mucous membranes are moist.   Eyes:   Conjunctivae normal and EOM are normal. No scleral icterus.  CV:  Normal rate, regular rhythm, normal heart sounds and radial pulses are 2+ and symmetric.  Systolic heart murmur.  Resp:  Breath sounds are clear bilaterally    Non-labored, no retractions or accessory muscle use  GI:  Abdomen is soft, no distension, no tenderness. No rebound or guarding.  No CVA tenderness bilaterally  MS:  Normal range of motion. No edema.    Normal strength in all 4 extremities.     Back atraumatic.    No midline cervical, thoracic, or lumbar tenderness  Skin:  Warm and dry.  No rash or lesions noted.  Neuro: Alert. Normal strength.  GCS: 15  Psych:  Normal mood and affect.    Emergency Department Course     Imaging:  US Pelvic Limited   Final Result   IMPRESSION:   1.  Mild heterogeneous appearance of the endometrium is unchanged with minimal fluid. Color Doppler imaging demonstrates no definitive evidence for internal flow within the endometrium.               US Pelvic Complete w Transvaginal   Final Result   IMPRESSION:   1.  Mild endometrial thickening and heterogeneity is nonspecific. Color flow imaging was not performed, and assessment for vascularity/retained products of conception is limited.                 Report per radiology    Laboratory:  Labs Ordered and Resulted from Time of ED Arrival to Time of ED Departure   ROUTINE UA WITH MICROSCOPIC REFLEX TO CULTURE - Abnormal       Result Value    Color Urine Straw      Appearance Urine Clear      Glucose Urine Negative      Bilirubin Urine Negative      Ketones Urine Negative      Specific Gravity Urine 1.008      Blood Urine Moderate (*)     pH Urine 6.0      Protein Albumin Urine Negative      Urobilinogen Urine  Normal      Nitrite Urine Negative      Leukocyte Esterase Urine Negative      Mucus Urine Present (*)     RBC Urine 1      WBC Urine <1      Squamous Epithelials Urine <1     HCG QUANTITATIVE PREGNANCY - Abnormal    hCG Quantitative 1,046 (*)    COMPREHENSIVE METABOLIC PANEL - Abnormal    Sodium 137      Potassium 3.7      Chloride 107      Carbon Dioxide (CO2) 25      Anion Gap 5      Urea Nitrogen 9      Creatinine 0.57      Calcium 9.1      Glucose 122 (*)     Alkaline Phosphatase 67      AST 14      ALT 29      Protein Total 7.3      Albumin 4.0      Bilirubin Total 0.4      GFR Estimate >90     CBC WITH PLATELETS AND DIFFERENTIAL - Abnormal    WBC Count 7.6      RBC Count 4.70      Hemoglobin 13.3      Hematocrit 40.0      MCV 85      MCH 28.3      MCHC 33.3      RDW 11.9      Platelet Count 202      % Neutrophils 86      % Lymphocytes 7      % Monocytes 7      % Eosinophils 0      % Basophils 0      % Immature Granulocytes 0      NRBCs per 100 WBC 0      Absolute Neutrophils 6.5      Absolute Lymphocytes 0.5 (*)     Absolute Monocytes 0.6      Absolute Eosinophils 0.0      Absolute Basophils 0.0      Absolute Immature Granulocytes 0.0      Absolute NRBCs 0.0     LACTIC ACID WHOLE BLOOD - Normal    Lactic Acid 1.2     INFLUENZA A/B & SARS-COV2 PCR MULTIPLEX - Normal    Influenza A PCR Negative      Influenza B PCR Negative      SARS CoV2 PCR Negative     BLOOD CULTURE   BLOOD CULTURE     Emergency Department Course:    Reviewed:  I reviewed nursing notes, vitals and past medical history    Assessments:  0120 I obtained history and examined the patient as noted above.   0418 I rechecked the patient and explained findings.     Consults:  0405 I spoke with Dr. Mclean of OB.    Interventions:  0126: Tylenol 1000 mg PO   0205: NS 1L IV Bolus    0208: Zofran 4 mg IV    Rocephin 1 g in 100 mL NS IV infusion    Doxycycline 100 mg PO     Disposition:  The patient was discharged to home.     Impression & Plan     CMS  Diagnoses: None    Medical Decision Making:  Patient is a 31-year-old female approximately 2 days postoperative from D&C due to missed  who presents with increased abdominal cramping, headache, and low-grade fevers.  Broad septic work-up performed in the emergency department remarkable for mild heterogenous appearance of the endometrium with no definitive evidence of color-flow within the endometrium and so lower concern for retained products of conception.  I spoke with Dr. Mclean with OB/GYN in regards to her presentation and with recent instrumentation and operative intervention would plan to treat for possible endometritis at this time.  Thankfully she is hemodynamically stable and T-max 100.1F in the emergency department.  Patient does not have leukocytosis, is not septic appearing, and thankfully electrolytes, kidney function, and urinalysis unremarkable.  Covid and influenza negative.  Beta hCG is elevated at 1046 and Dr. Mclean had recommended a repeat beta hCG on Monday with the patient's OB/GYN Dr. Iraheta in clinic.  In discussion with Dr. Mclean she would like to treat the patient with a course of antibiotics for suspected endometritis.  She received a dose of ceftriaxone in the emergency department with plans for oral doxycycline for use in the outpatient setting.  Should be sent with a 7-day course of doxycycline per the recommendation of Dr. Mclean.  She will make an appointment with Dr. Iraheta on Monday following the weekend for recheck from [facility]'s ED visit.  Return sooner to the emergency department with any new or concerning symptom onset.  After all questions answered and return precautions understood, discharged home.    Lastly, the patient did have a gentle systolic murmur noted on physical exam.  Low concern this is related to presentation regarding fever and abd cramping.  Will have patient follow up with primary care in regards to this incidental heart murmur for further  outpatient testing if indicated.    Diagnosis:    ICD-10-CM    1. Endometritis  N71.9    2. Heart murmur  R01.1    3. Fever in adult  R50.9    4. Suprapubic abdominal pain  R10.2      Discharge Medications:  Discharge Medication List as of 12/31/2021  5:37 AM      START taking these medications    Details   doxycycline hyclate (VIBRAMYCIN) 100 MG capsule Take 1 capsule (100 mg) by mouth 2 times daily for 7 days, Disp-14 capsule, R-0, Local Print           Scribe Disclosure:  Eleanor GORDON, am serving as a scribe at 1:07 AM on 12/31/2021 to document services personally performed by Leopoldo Pace MD based on my observations and the provider's statements to me.            Leopoldo Pace MD  12/31/21 [id number]

## 2021-12-31 NOTE — ED TRIAGE NOTES
pt reports D&C on tuesday - increased cramping, headache and low grade fevers. Was told to come in by OB for eval. ibuprofen taken at 2000.

## 2022-01-03 DIAGNOSIS — O02.1 MISSED ABORTION: Primary | ICD-10-CM

## 2022-01-03 NOTE — PROGRESS NOTES
SUBJECTIVE:                                                   Nory Woods is a 31 year old female who presents to clinic today for the following health issue(s):  Patient presents with:  Follow Up: ER 12/31 for fever and endometritis, after D&C on 12/28. Symptoms resolved with antibiotics.     HPI:  Patient underwent an uncomplicated D&C on 12/28 for MAB from the CN service. She then presented on 12/31 to the ER with fever of 100.4 and chills, shakes, malaise, fatigue, HA. Had some increased cramping and some increased bleeding. No other URI sx or UTI sx.  Flu A/B and covid testing was negative and covid had been neg preop as well  She had documented temp in the ED but other vitals stable, no elevation in WBC, no localizing sx, no uterine tenderness or foul smelling discharge  Out of caution given recent D&C/MAB she was Rx course of abx and an HCG was obtained that was 1000 range. Many weeks earlier had had one that was 500 but never checked again and clearly was higher at some point b/c was 8+2 by crown rump on her viability U/S where MAB was dx'd.  On U/S in the ER she had some blood clot but no obvious retained POC in the uterus noted    Plan was to f/u with exam and a repeat HCG to follow the trend.    Patient reports she had one more fever after getting home from the ER and taking first abx pill. She has continued doing her abx since and no fever or chills or illness sx since. 2 days ago, so 3 days after the ER visit, she had much more intense cramping and then passed a blood clot and shows by fingers that it was about 6cm or so. Since then her cramping is much improved and her bleeding has definitely slowed down and is a darker brick red and not so fresh bright red anymore.  HCG was drawn today and pending    Also told in the ER that she had a heart murmur. Had never heard that before and ER doc didn't make it sound like a big deal but has been really anxious since then about it.    Patient's last  menstrual period was 10/13/2021 (exact date).    Patient is sexually active, .  Using nothing for contraception.    reports that she has never smoked. She has never used smokeless tobacco.    STD testing offered?  Declined    Health maintenance updated:  yes    Today's PHQ-2 Score: No flowsheet data found.  Today's PHQ-9 Score:   PHQ-9 SCORE 2021   PHQ-9 Total Score MyChart 0   PHQ-9 Total Score 0     Today's DORCAS-7 Score:   DORCAS-7 SCORE 2021   Total Score 1 (minimal anxiety)   Total Score 1       Problem list and histories reviewed & adjusted, as indicated.  Additional history: as documented.    Patient Active Problem List   Diagnosis     Hypothyroidism     Supervision of normal first pregnancy     Past Surgical History:   Procedure Laterality Date     DILATION AND CURETTAGE SUCTION N/A 2021    Procedure: DILATION AND CURETTAGE, UTERUS, USING SUCTION;  Surgeon: Radha Iraheta MD;  Location: [facility] OR     WISDOM TOOTH EXTRACTION        Social History     Tobacco Use     Smoking status: Never Smoker     Smokeless tobacco: Never Used   Substance Use Topics     Alcohol use: Not Currently      Problem (# of Occurrences) Relation (Name,Age of Onset)    Alzheimer Disease (1) Paternal Grandmother    Endometriosis (1) Mother    Hypertension (1) Maternal Grandfather    Myocardial Infarction (1) Maternal Grandfather    Pacemaker (1) Paternal Grandfather            Current Outpatient Medications   Medication Sig     doxycycline hyclate (VIBRAMYCIN) 100 MG capsule Take 1 capsule (100 mg) by mouth 2 times daily for 7 days     levothyroxine (SYNTHROID/LEVOTHROID) 50 MCG tablet Take 50 mcg by mouth     Prenatal Vit-Fe Fumarate-FA (PRENATAL MULTIVITAMIN W/IRON) 27-0.8 MG tablet Take 1 tablet by mouth daily     No current facility-administered medications for this visit.     No Known Allergies    ROS:  12 point review of systems negative other than symptoms noted below or in the HPI.  Constitutional: Fatigue  "and Fever  Genitourinary: cramping and bleeding though improving the last 2 days  No urinary frequency or dysuria, bladder or kidney problems      OBJECTIVE:     /70   Ht 1.702 m (5' 7\")   Wt 61.7 kg (136 lb)   LMP 10/13/2021 (Exact Date)   BMI 21.30 kg/m    Body mass index is 21.3 kg/m .    Exam:  Constitutional:  Appearance: Well nourished, well developed alert, in no acute distress  Chest:  Respiratory Effort:  Breathing unlabored. Clear to auscultation bilaterally.   Cardiovascular: Heart: Auscultation:  Regular rate, normal rhythm, no murmurs present  Pelvic Exam:  External Genitalia:     Normal appearance for age, no discharge present, no tenderness present, no inflammatory lesions present, color normal  Vagina:     Normal vaginal vault without central or paravaginal defects, no discharge present, no inflammatory lesions present, no masses present  Bladder:     Nontender to palpation  Urethra:   Urethral Body:  Urethra palpation normal, urethra structural support normal   Urethral Meatus:  No erythema or lesions present  Cervix:     Appearance healthy, no lesions present, nontender to palpation, mild dark red to brown bleeding present  Uterus:     Uterus: firm, normal sized and nontender, anteverted in position.   Adnexa:     No adnexal tenderness present, no adnexal masses present  Perineum:     Perineum within normal limits, no evidence of trauma, no rashes or skin lesions present  Anus:     Anus within normal limits, no hemorrhoids present  Inguinal Lymph Nodes:     No lymphadenopathy present  Pubic Hair:     Normal pubic hair distribution for age  Genitalia and Groin:     No rashes present, no lesions present, no areas of discoloration, no masses present       In-Clinic Test Results:  No results found for this or any previous visit (from the past 24 hour(s)).    ASSESSMENT/PLAN:                                                        ICD-10-CM    1. Postoperative fever  R50.82    2. Missed ab  " O02.1          Unclear source for her postoperative fever and could have been an early endometritis though no elevation in WBC and no uterine tenderness do make it less typical of a presentation  Regardless was appropriately started on ABx and has defervesced and is feeling better and after passing the large clot with more cramping her bleeding is much lighter and cramping is also better  Could be that simply had an inflammatory fever from retained clot and once that passed her other sx improved as well.  hoewver will regardless have her complete abx course  Will contact her with her HCG level once it's back and if appropriate drop then will just have her wait for her next cycle and then can start trying to conceive again. If drop is not to the degree expected then may either repeat serial HCG here or at least have her repeat a home UPT to ensure appropriate drop and to know when to anticipate cycle return.    Patient is at baseline fairly anxious and appropriately more so now. Reassured her that the murmur heard in the ER was likely due to mild anemia and to fever/tachycardia  Also had right thigh/calf pain so concerned about DVT but has no si/sx of that w/o swelling/redness/tenderness in the leg and likely more just had some callie horse/muscle spasm from circulatory changes of SAB, surgery and fever  Discussed conceiving the next time and that we could do serial HCGs in early pregnancy and then could do even a weekly viability scan from 6-10 weeks. Understands that this would not preclude a potential SAB but would at least give her some intermittent reassurance on normal progression and patient is very much wanting to pursue that.    Does not need to do her scheduled 4 week postop f/u with CNMs and will f/u with me from here on out and will contact her with lab results to determine what the plan should be.    On the date of the encounter, 30 minutes was spent on reviewing imaging, lab work, and other provider  notes, along with direct management of the patient's medical issues as above.      Radha Iraheta MD  [facility]

## 2022-01-05 ENCOUNTER — OFFICE VISIT (OUTPATIENT)
Dept: OBGYN | Facility: CLINIC | Age: 32
End: 2022-01-05
Payer: COMMERCIAL

## 2022-01-05 ENCOUNTER — LAB (OUTPATIENT)
Dept: LAB | Facility: CLINIC | Age: 32
End: 2022-01-05
Payer: COMMERCIAL

## 2022-01-05 VITALS
WEIGHT: 136 LBS | DIASTOLIC BLOOD PRESSURE: 70 MMHG | SYSTOLIC BLOOD PRESSURE: 104 MMHG | HEIGHT: 67 IN | BODY MASS INDEX: 21.35 KG/M2

## 2022-01-05 DIAGNOSIS — O02.1 MISSED AB: ICD-10-CM

## 2022-01-05 DIAGNOSIS — O02.1 MISSED ABORTION: ICD-10-CM

## 2022-01-05 DIAGNOSIS — R50.82 POSTOPERATIVE FEVER: Primary | ICD-10-CM

## 2022-01-05 LAB
B-HCG SERPL-ACNC: 134 IU/L (ref 0–5)
BACTERIA BLD CULT: NO GROWTH
BACTERIA BLD CULT: NO GROWTH

## 2022-01-05 PROCEDURE — 84702 CHORIONIC GONADOTROPIN TEST: CPT

## 2022-01-05 PROCEDURE — 99024 POSTOP FOLLOW-UP VISIT: CPT | Performed by: OBSTETRICS & GYNECOLOGY

## 2022-01-05 PROCEDURE — 36415 COLL VENOUS BLD VENIPUNCTURE: CPT

## 2022-01-05 ASSESSMENT — MIFFLIN-ST. JEOR: SCORE: 1374.52

## 2022-01-06 NOTE — RESULT ENCOUNTER NOTE
Nory,    Your HCG level has fallen quite a bit and to an amount I expected and think is appropriate for having the procedure a week ago.  My guess is that it will be fairly close to zero, or at least <20-25 which would be detected on a home UPT in 7-10 days. You can definitely check a home pregnancy testing in 7-10 days to see. If it's not negative by 10 days, then we should repeat a blood level here again.    As soon as its <20-25, your ovulation should be able to restart so my guess is you will ovulate within 2 weeks of the drop in level which is in 7-10 days, so a period in about 5 weeks or so.    AJ

## 2022-01-15 ENCOUNTER — HEALTH MAINTENANCE LETTER (OUTPATIENT)
Age: 32
End: 2022-01-15

## 2022-07-27 NOTE — PROGRESS NOTES
SUBJECTIVE:      HPI:     This is a 31 year old female patient,  who presents for her first obstetrical visit.     CHELSEY: 2022, by Last Menstrual Period.  She is 7w1d weeks.  Her cycles are regular.  Her last menstrual period was normal.   Since her LMP, she has experienced  nausea, fatigue and cramping).   She denies emesis, abdominal pain, headache, loss of appetite, vaginal discharge, dysuria, pelvic pain, urinary urgency, lightheadedness, urinary frequency, vaginal bleeding, hemorrhoids and constipation.    Pap done this year or last self reports as normal     Additional History: mild depression- in past has been on sertraline-not currently on meds, hypothyroidism-on levothyroxine managed by PCP,  Kana Wylie-hx of cold sores  Avoids dairy d/t upset stomach however does eat yogurt.   Have you travelled during the pregnancy?Yes, If yes, where? In [place] not international if yes, who? [place]        HISTORY:   Planned Pregnancy: Yes  Marital Status:   Occupation:  currently works from home.   Living in Household: Spouse     Past History:  Her past medical history   Past Medical History        Past Medical History:   Diagnosis Date     Depressive disorder       History of PCOS      Hypothyroidism      Mild depression (H)        .       She has a history of  first pregnancy      Since her last LMP she denies use of alcohol, tobacco and street drugs.     Past medical, surgical, social and family history were reviewed and updated in EPIC.               Current Outpatient Medications   Medication     levothyroxine (SYNTHROID/LEVOTHROID) 50 MCG tablet     Prenatal Vit-Fe Fumarate-FA (PRENATAL MULTIVITAMIN W/IRON) 27-0.8 MG tablet      No current facility-administered medications for this visit.         ROS:   12 point review of systems negative other than symptoms noted below or in the HPI.  Constitutional: Fatigue  Gastrointestinal: Nausea  Genitourinary:  Cramps        OBJECTIVE:      EXAM:  Wt 61.7 kg (136 lb)   LMP 10/13/2021 (Exact Date)   BMI 21.30 kg/m   Body mass index is 21.3 kg/m .     Nurse phone visit completed. Prenatal book and folder (containing standard educational hand-outs and brochures)  will be given at the next visit to patient. Information in folder reviewed over the phone. Questions answered. Brochure given on optional screening available to assess chromosomal anomalies. Pt desires NIPS. Pt advised to call the clinic if she has any questions or concerns related to her pregnancy. Prenatal labs future ordered. New prenatal visit scheduled on 12/10/2021 with Julia Noel CNM.        No results found for: PAP  PHQ-9 score:    PHQ 12/1/2021   PHQ-9 Total Score 0   Q9: Thoughts of better off dead/self-harm past 2 weeks Not at all         DORCAS-7 SCORE 12/1/2021   Total Score 1 (minimal anxiety)   Total Score 1         Patient supplied answers from flow sheet for:  Prenatal OB Questionnaire.  Past Medical History  Have you ever recieved care for your mental health? : (!) Yes  Have you ever been in a major accident or suffered serious trauma?: No  Within the last year, has anyone hit, slapped, kicked or otherwise hurt you?: No  In the last year, has anyone forced you to have sex when you didn't want to?: No     Past Medical History 2   Have you ever received a blood transfusion?: No  Would you accept a blood transfusion if was medically recommended?: Yes  Does anyone in your home smoke?: No   Is your blood type Rh negative?: Unknown  Have you ever ?: No  Have you been hospitalized for a nonsurgical reason excluding normal delivery?: No  Have you ever had an abnormal pap smear?: No     Past Medical History (Continued)  Do you have a history of abnormalities of the uterus?: No  Did your mother take SONY or any other hormones when she was pregnant with you?: No  Do you have any other problems we have not asked about which you feel may be  important to this pregnancy?: (!) Yes  Viky Martinez RN        OBJECTIVE:     EXAM:  /68   Wt 64 kg (141 lb)   LMP 10/13/2021 (Exact Date)   BMI 22.08 kg/m   Body mass index is 22.08 kg/m .    GENERAL: healthy, alert and no distress  NECK: no adenopathy, no asymmetry, masses, or scars and thyroid normal to palpation  RESP: lungs clear to auscultation - no rales, rhonchi or wheezes  BREAST: normal without masses, tenderness or nipple discharge and no palpable axillary masses or adenopathy  CV: regular rate and rhythm, normal S1 S2, no S3 or S4, no murmur, click or rub, no peripheral edema and peripheral pulses strong  ABDOMEN: soft, nontender, no hepatosplenomegaly, no masses and bowel sounds normal  NEURO: Normal strength and tone, mentation intact and speech normal  PSYCH: mentation appears normal, affect normal/bright    ASSESSMENT/PLAN:       ICD-10-CM    1. Hypothyroidism affecting pregnancy, antepartum  O99.280 TSH with free T4 reflex    E03.9 TSH with free T4 reflex   2. Encounter for supervision of normal first pregnancy in first trimester  Z34.01 Urine Culture Aerobic Bacterial     *UA reflex to Microscopic     ABO/Rh type and screen     Hepatitis B surface antigen     CBC with platelets     HIV Antigen Antibody Combo     Rubella Antibody IgG     Treponema Abs w Reflex to RPR and Titer     Hepatitis C antibody   3. 8 weeks gestation of pregnancy  Z3A.08    4. At risk for sexually transmitted disease due to partner with oral herpes simplex  Z91.89    5. Nausea  R11.0        31 year old , On 2021 patient is 7w6d weeks of pregnancy with CHELSEY of 2022, by Last Menstrual Period      COUNSELING    Instructed on use of triage nurse line and contacting the on call CNM after hours in an emergency.     Symptoms of N&V and fatigue usually start to resolve around 12-16 weeks     Reviewed CNM philosophy, call schedule for labor and delivery, and FSH for delivery    1st OB handout given  "outlining appointment spacing and CNM information    Reviewed exercise and nutrition    Recommend to gain 25-35 pounds with her pregnancy.    Discussed OTC medications. OB med list given    Encouraged patient to take PNV's/DHA    Will call patient/MyChart with lab results when available. If TSH abnormal will advised pt to f/u with PCP who is managing medication    Will call for \"lab only\" visit for genetic screening at 10+ weeks.     Discussed risk of HSV during pregnancy/birth. Advised no oral sex during 3rd trimester due to partner hx of oral HSV1     Declines flu vaccine today    Discussed close follow up on mood throughout and after pregnancy. Restarting medication or counseling an option, if needed.    Will return to the clinic in 4 weeks for her next routine prenatal check.  Will call to be seen sooner if problems arise.     MAHAD Enriquez CNM    Next visit: Ask about flu vaccine again  " Him/He

## 2022-07-29 ENCOUNTER — PRENATAL OFFICE VISIT (OUTPATIENT)
Dept: NURSING | Facility: CLINIC | Age: 32
End: 2022-07-29
Payer: COMMERCIAL

## 2022-07-29 VITALS — BODY MASS INDEX: 21.3 KG/M2 | WEIGHT: 136 LBS

## 2022-07-29 DIAGNOSIS — Z34.01 ENCOUNTER FOR SUPERVISION OF NORMAL FIRST PREGNANCY IN FIRST TRIMESTER: Primary | ICD-10-CM

## 2022-07-29 PROBLEM — O03.9 MISCARRIAGE: Status: ACTIVE | Noted: 2021-12-22

## 2022-07-29 PROCEDURE — 99207 PR NO CHARGE NURSE ONLY: CPT

## 2022-07-29 NOTE — PROGRESS NOTES
"    SUBJECTIVE:     HPI:    This is a 32 year old female patient,  who presents for her first obstetrical visit.    CHELSEY: 3/16/2023, by Last Menstrual Period.  She is 7w1d weeks.  Her cycles are regular.  Her last menstrual period was normal.   Since her LMP, she has experienced  nausea and fatigue).   She denies emesis, abdominal pain, headache, loss of appetite, vaginal discharge, dysuria, pelvic pain, urinary urgency, lightheadedness, urinary frequency, vaginal bleeding, hemorrhoids and constipation.    Additional History: PCOS, mild depression-not on meds, hypothyroidism-on levothyroxine 50mcg, managed by pcp.  SAB 10w needing D&C 21  Currently is training for a marathon in October   [place]-ongoing       Have you travelled during the pregnancy?No  Have your sexual partner(s) travelled during the pregnancy?No      HISTORY:   Planned Pregnancy: No  Marital Status:   Occupation:  at [employer]  Living in Household: Spouse    Past History:  Her past medical history   Past Medical History:   Diagnosis Date     Depressive disorder      History of PCOS     had amenorrhea in college. pt reports hirsutism most of her life but also \"very skinny\" in college and occasionally had cysts. normal and regular periods in adult life     Hypothyroidism      Mild depression (H)      Tinnitus, bilateral    .      She has a history of  SAB x1    Since her last LMP she denies use of alcohol, tobacco and street drugs.    Past medical, surgical, social and family history were reviewed and updated in [facility].        Current Outpatient Medications   Medication     levothyroxine (SYNTHROID/LEVOTHROID) 50 MCG tablet     Prenatal Vit-Fe Fumarate-FA (PRENATAL MULTIVITAMIN W/IRON) 27-0.8 MG tablet     No current facility-administered medications for this visit.       ROS:   12 point review of systems negative other than symptoms noted below or in the HPI.  Constitutional: Fatigue  Gastrointestinal: " Nausea      OBJECTIVE:     EXAM:  LMP 06/09/2022  Body mass index is 21.3 kg/m .    Nurse phone visit completed. Prenatal book and folder (containing standard educational hand-outs and brochures)  will be given at the next visit to patient. Information in folder reviewed over the phone. Questions answered. Brochure given on optional screening available to assess chromosomal anomalies. Pt desires  NIPS. Pt advised to call the clinic if she has any questions or concerns related to her pregnancy. Prenatal labs future ordered. New prenatal visit scheduled on 8/2/22 with .      No results found for: PAP  PHQ-9 score:    PHQ 7/28/2022   PHQ-9 Total Score 1   Q9: Thoughts of better off dead/self-harm past 2 weeks Not at all               DORCAS-7 SCORE 12/1/2021 7/28/2022   Total Score 1 (minimal anxiety) 0 (minimal anxiety)   Total Score 1 0         Patient supplied answers from flow sheet for:  Prenatal OB Questionnaire.  Past Medical History  Have you ever recieved care for your mental health? : (!) Yes  Have you ever been in a major accident or suffered serious trauma?: No  Within the last year, has anyone hit, slapped, kicked or otherwise hurt you?: No  In the last year, has anyone forced you to have sex when you didn't want to?: No    Past Medical History 2   Have you ever received a blood transfusion?: No  Would you accept a blood transfusion if was medically recommended?: Yes  Does anyone in your home smoke?: No   Is your blood type Rh negative?: Unknown  Have you ever ?: No  Have you been hospitalized for a nonsurgical reason excluding normal delivery?: No  Have you ever had an abnormal pap smear?: No    Past Medical History (Continued)  Do you have a history of abnormalities of the uterus?: No  Did your mother take SONY or any other hormones when she was pregnant with you?: No  Do you have any other problems we have not asked about which you feel may be important to this pregnancy?: No                  Viky Martinez RN

## 2022-07-30 ENCOUNTER — NURSE TRIAGE (OUTPATIENT)
Dept: NURSING | Facility: CLINIC | Age: 32
End: 2022-07-30

## 2022-07-30 NOTE — TELEPHONE ENCOUNTER
"OB Triage Call      Is patient's OB/Midwife with the [facility] or [facility]? [facility]- Proceed with triage     Reason for call: scant brown discharge     Assessment: Patient is 7w2d pregnant,     Plan: Home care    Patient plans to deliver at [facility]    Patient's primary OB Provider is JENNIFER Iraheta      Per protocol recommendations Patient to follow home care recommendations. An FYI page or consult is not needed unless patient is requesting to speak to midwife or MD, they are in labor, or it is recommended by triage protocol    Is patient's delivering hospital on divert? Does not apply due to Patient given home care instructions      7w2d    Estimated Date of Delivery: Mar 16, 2023        OB History    Para Term  AB Living   2 0 0 0 1 0   SAB IAB Ectopic Multiple Live Births   0 0 0 0 0      # Outcome Date GA Lbr Flaquito/2nd Weight Sex Delivery Anes PTL Lv   2 Current            1 AB 21 10w6d             Birth Comments: 10 weeks by sure LMP measuring 8+2 with no FHTs. D&C with Esequiel. had seen the CNMs prior to that       No results found for: GBS       Frances Knox RN 22 8:41 AM  [facility] Nurse Advisor    Nurse Triage SBAR    Is this a 2nd Level Triage? NO    Situation: Patient is 7w2d pregnant and  took 1/2 tablet of Unisom for nausea last evening. Woke up with scant brown discharge.     Background: Recently had miscarrigage and concerned the Unisom may have some cause for concerns regarding miscarriage of this pregnancy.     Assessment: Patient reports brown discharge mixed with her normal discharge. Denies cramping. Denies blood or painful urination. Reports the brown discharge is scant Patient states she has tried hard candies to control nausea and states the nausea is \"miserable\". Wants to know if it is safe to continue taking Unisom to control nausea and get rest.    Protocol Recommended Disposition:   No disposition on file.    Recommendation: Home Care. Page sent " to on-call (CANDIDO Iraheta MD) and MD Iraheta returned page stating Unisom is completely safe to take every night as needed. [facility] called patient back to luciano SHELL states Unisom is safe and not related to brown discharge.    Frances Knox RN on 2022 at 9:04 AM      Paged to provider    Does the patient meet one of the following criteria for ADS visit consideration? 16+ years old, with an [facility] PCP     TIP  Providers, please consider if this condition is appropriate for management at one of our Acute and Diagnostic Services sites.     If patient is a good candidate, please use dotphrase <dot>triageresponse and select Refer to ADS to document.         Additional Information    Negative: [1] Vaginal bleeding AND [2] pregnant > 20 weeks    Negative: [1] Vaginal bleeding AND [2] pregnant < 20 weeks    Negative: [1] Having contractions or other symptoms of labor AND [2] < 37 weeks pregnant (i.e., )    Negative: [1] Having contractions or other symptoms of labor AND [2] > 36 weeks pregnant (i.e., term pregnancy)    Negative: Leakage of fluid (trickle, gush) from the vagina    Negative: Foreign body in vagina (e.g., tampon)    Negative: Pain or burning with passing urine (urination) is main symptom    Negative: [1] Pregnant 23 or more weeks AND [2] baby is moving less today (e.g., kick count < 5 in 1 hour or < 10 in 2 hours)    Negative: Patient sounds very sick or weak to the triager    Negative: [1] Constant abdominal pain AND [2] present > 2 hours    Negative: [1] Intermittent lower abdominal pain AND [2] present > 24 hours    Negative: [1] Pregnant 24-36 weeks () AND [2] pinkish or brownish mucous discharge    Negative: [1] Yellow or green vaginal discharge AND [2] fever    Negative: Painful rash with tiny water blisters    Negative: [1] Rash (e.g., redness, tiny bumps, sore) of genital area AND [2] present > 24 hours    Negative: Abnormal color vaginal discharge (i.e., yellow, green, gray)     "Negative: Bad smelling vaginal discharge    Negative: Tender lump (swelling or \"ball\") at vaginal opening    Negative: [1] Symptoms of a \"yeast infection\" (i.e., itchy, white discharge, not bad smelling) AND [2] not improved > 3 days following CARE ADVICE    Negative: Patient is worried about sexually transmitted disease (STD)    Negative: Pain with sexual intercourse (dyspareunia)    Negative: [1] Pregnant > 36 weeks (term) AND [2] pinkish or brownish mucous discharge    Negative: [1] Pregnant > 36 weeks (term) AND [2] passed a small glob or chunk of mucous (may look like gelatin or snot)    Negative: [1] Rash (e.g., redness, tiny bumps, sore) of genital area AND [2] present < 24 hours    Negative: Symptoms of a vaginal yeast infection (i.e., white, thick, cottage-cheese-like, itchy, not bad smelling discharge)    Normal vaginal discharge in pregnancy    Protocols used: PREGNANCY - VAGINAL [id number]      "

## 2022-08-01 NOTE — PROGRESS NOTES
"SUBJECTIVE:      HPI:     This is a 32 year old female patient,  at 7w5d who presents for US follow-up. Has new ob scheduled .   Planned and desired pregnancy. Recent missed ab/D&C. Taking Unisom and b6 for nausea. On  had some spotting. This is resolved. Has a lot of food aversions and nausea. No emesis. Difficulty with PNV- taking folate twice a week. Wondering about TSH testing. Last tested end of  and was wnl. No changes in dosing recently.          OBJECTIVE:          EXAM:  /58   Ht 1.702 m (5' 7\")   Wt 61 kg (134 lb 6.4 oz)   LMP 2022   Breastfeeding No   BMI 21.05 kg/m   Body mass index is 21.05 kg/m .    GENERAL: healthy, alert and no distress      US: results pending. 7w3d by US, +CA         ASSESSMENT/PLAN:       ICD-10-CM    1. Encounter for supervision of normal first pregnancy in first trimester  Z34.01 Urine Culture Aerobic Bacterial     *UA reflex to Microscopic     Urine Microscopic Exam     Nory Woods is a 32 year old  at 7w5d by LMP c/w 7w3d US.   Reviewed viable IUP on US today. Continue current management of N/V of pregnancy. Plans prenatal labs after 10w with NIPT- schedule lab appt after . Discussed recheck of TSH in second tri.   Prefers to have repeat US for reassurance and new ob with Dr. Iraheta as scheduled 8/15.       January Lorenzo MD, [facility]      "

## 2022-08-02 ENCOUNTER — PRENATAL OFFICE VISIT (OUTPATIENT)
Dept: OBGYN | Facility: CLINIC | Age: 32
End: 2022-08-02
Attending: OBSTETRICS & GYNECOLOGY
Payer: COMMERCIAL

## 2022-08-02 VITALS
BODY MASS INDEX: 21.09 KG/M2 | WEIGHT: 134.4 LBS | SYSTOLIC BLOOD PRESSURE: 101 MMHG | HEIGHT: 67 IN | DIASTOLIC BLOOD PRESSURE: 58 MMHG

## 2022-08-02 DIAGNOSIS — Z34.01 ENCOUNTER FOR SUPERVISION OF NORMAL FIRST PREGNANCY IN FIRST TRIMESTER: ICD-10-CM

## 2022-08-02 LAB
ALBUMIN UR-MCNC: NEGATIVE MG/DL
APPEARANCE UR: CLEAR
BACTERIA #/AREA URNS HPF: ABNORMAL /HPF
BILIRUB UR QL STRIP: NEGATIVE
COLOR UR AUTO: YELLOW
GLUCOSE UR STRIP-MCNC: NEGATIVE MG/DL
HGB UR QL STRIP: ABNORMAL
KETONES UR STRIP-MCNC: 15 MG/DL
LEUKOCYTE ESTERASE UR QL STRIP: NEGATIVE
MUCOUS THREADS #/AREA URNS LPF: PRESENT /LPF
NITRATE UR QL: NEGATIVE
PH UR STRIP: 6 [PH] (ref 5–7)
RBC #/AREA URNS AUTO: ABNORMAL /HPF
SP GR UR STRIP: >=1.03 (ref 1–1.03)
SQUAMOUS #/AREA URNS AUTO: ABNORMAL /LPF
UROBILINOGEN UR STRIP-ACNC: 0.2 E.U./DL
WBC #/AREA URNS AUTO: ABNORMAL /HPF

## 2022-08-02 PROCEDURE — 81001 URINALYSIS AUTO W/SCOPE: CPT | Performed by: STUDENT IN AN ORGANIZED HEALTH CARE EDUCATION/TRAINING PROGRAM

## 2022-08-02 PROCEDURE — 99213 OFFICE O/P EST LOW 20 MIN: CPT | Performed by: STUDENT IN AN ORGANIZED HEALTH CARE EDUCATION/TRAINING PROGRAM

## 2022-08-02 PROCEDURE — 87086 URINE CULTURE/COLONY COUNT: CPT | Performed by: STUDENT IN AN ORGANIZED HEALTH CARE EDUCATION/TRAINING PROGRAM

## 2022-08-02 RX ORDER — PYRIDOXINE HCL (VITAMIN B6) 50 MG
50 TABLET ORAL DAILY
Status: ON HOLD | COMMUNITY
End: 2023-01-11

## 2022-08-04 LAB — BACTERIA UR CULT: NO GROWTH

## 2022-08-05 NOTE — RESULT ENCOUNTER NOTE
Nory,    Your urine analysis is showing signs of dehydration and not eating much, guessing that you're just not feeling great from the pregnancy. So try to just not go more than 2-3 hours without eating and smaller high carb foods/portions and just try to drink as much water to hydrate as you can.  The urine culture is not showing signs of a UTI or any infection though.    Radha Iraheta MD

## 2022-08-12 ASSESSMENT — ANXIETY QUESTIONNAIRES
2. NOT BEING ABLE TO STOP OR CONTROL WORRYING: NOT AT ALL
GAD7 TOTAL SCORE: 0
7. FEELING AFRAID AS IF SOMETHING AWFUL MIGHT HAPPEN: NOT AT ALL
7. FEELING AFRAID AS IF SOMETHING AWFUL MIGHT HAPPEN: NOT AT ALL
IF YOU CHECKED OFF ANY PROBLEMS ON THIS QUESTIONNAIRE, HOW DIFFICULT HAVE THESE PROBLEMS MADE IT FOR YOU TO DO YOUR WORK, TAKE CARE OF THINGS AT HOME, OR GET ALONG WITH OTHER PEOPLE: NOT DIFFICULT AT ALL
8. IF YOU CHECKED OFF ANY PROBLEMS, HOW DIFFICULT HAVE THESE MADE IT FOR YOU TO DO YOUR WORK, TAKE CARE OF THINGS AT HOME, OR GET ALONG WITH OTHER PEOPLE?: NOT DIFFICULT AT ALL
GAD7 TOTAL SCORE: 0
4. TROUBLE RELAXING: NOT AT ALL
6. BECOMING EASILY ANNOYED OR IRRITABLE: NOT AT ALL
GAD7 TOTAL SCORE: 0
1. FEELING NERVOUS, ANXIOUS, OR ON EDGE: NOT AT ALL
5. BEING SO RESTLESS THAT IT IS HARD TO SIT STILL: NOT AT ALL
3. WORRYING TOO MUCH ABOUT DIFFERENT THINGS: NOT AT ALL

## 2022-08-12 ASSESSMENT — PATIENT HEALTH QUESTIONNAIRE - PHQ9
SUM OF ALL RESPONSES TO PHQ QUESTIONS 1-9: 0
SUM OF ALL RESPONSES TO PHQ QUESTIONS 1-9: 0
10. IF YOU CHECKED OFF ANY PROBLEMS, HOW DIFFICULT HAVE THESE PROBLEMS MADE IT FOR YOU TO DO YOUR WORK, TAKE CARE OF THINGS AT HOME, OR GET ALONG WITH OTHER PEOPLE: NOT DIFFICULT AT ALL

## 2022-08-15 ENCOUNTER — PRENATAL OFFICE VISIT (OUTPATIENT)
Dept: OBGYN | Facility: CLINIC | Age: 32
End: 2022-08-15

## 2022-08-15 ENCOUNTER — ANCILLARY PROCEDURE (OUTPATIENT)
Dept: ULTRASOUND IMAGING | Facility: CLINIC | Age: 32
End: 2022-08-15
Payer: COMMERCIAL

## 2022-08-15 VITALS — BODY MASS INDEX: 21.3 KG/M2 | SYSTOLIC BLOOD PRESSURE: 120 MMHG | WEIGHT: 136 LBS | DIASTOLIC BLOOD PRESSURE: 68 MMHG

## 2022-08-15 DIAGNOSIS — O09.291 HISTORY OF MISCARRIAGE, CURRENTLY PREGNANT, FIRST TRIMESTER: ICD-10-CM

## 2022-08-15 DIAGNOSIS — E03.9 HYPOTHYROID IN PREGNANCY, ANTEPARTUM, FIRST TRIMESTER: ICD-10-CM

## 2022-08-15 DIAGNOSIS — O99.281 HYPOTHYROID IN PREGNANCY, ANTEPARTUM, FIRST TRIMESTER: ICD-10-CM

## 2022-08-15 DIAGNOSIS — O09.91 SUPERVISION OF HIGH RISK PREGNANCY IN FIRST TRIMESTER: Primary | ICD-10-CM

## 2022-08-15 PROCEDURE — 76801 OB US < 14 WKS SINGLE FETUS: CPT | Performed by: OBSTETRICS & GYNECOLOGY

## 2022-08-15 PROCEDURE — 99207 PR FIRST OB VISIT: CPT | Performed by: OBSTETRICS & GYNECOLOGY

## 2022-08-15 NOTE — PROGRESS NOTES
Patient is visiting today for her 1st ob appt.   Went over U/s, reassured her that will not be at risk for miscarriage even at 9wks based on strong heartbeat.     She reports that she is having more sx w/ this pregnancy as did before w/ 1st preg. Says that she is more bloated, nausea, and threw up 2x one night.   Has been taking unisom to help her go to bed and vit B6 for nausea relief. Says that the vit B6 lasts for about 5-6 hours before having to take it again. Discussed eating more even grazing every few hours in order to help patient get over nausea. Patient has been eating cottage cheese as is unable to stomach most foods now, went over eating more protein to help patient stay full longer.   Is wondering when the nausea and morning sickness will rollover, reassured patient that will usually rollover in the next few weeks as approaching 2nd trimester. Patient would not like to take any medications for nausea and throwing up as feels that will rollover as approaching 2nd trimester, but advised patient that can contact me via [employer] message for Zofran rx.     Patient and husb would like to have genetic testing. Discussed that will just be testing for number of chromosomes and not what genetics baby may have. Reassured patient and husb that the genetic testing is 99% accurate and to not have IAB based on results as will send them to do AFP testing.   Went over results of the genetic testing and results of gender to possibly be revealed w/ results of genetic testing, to not open email w/ genetic testing results, and to wait for email from me w/ summary. Patient and husb is okay with knowing the gender via [employer].     Discussed lab results and expectations for first ob labs scheduled for this Friday, TSH to be checked on Friday, and results being returned, if does not hear back from me immediately, not really a concern. Reassured patient that can schedule heartbeat check for baby if patient is feeling nervous  about miscarriage.     Went over the events of future ob visits, who will be delivering her baby and discussed on-call days and surgery days. Recommend patient to schedule her future ob visits incase scheduling become difficult.

## 2022-08-15 NOTE — PROGRESS NOTES
HPI:     This is a 32 year old female patient,  who presents for her first obstetrical visit.     CHELSEY: 3/16/2023, by Last Menstrual Period.  She is 9w4d weeks.  Her cycles are regular.  Her last menstrual period was normal.   Since her LMP, she has experienced  nausea and fatigue).   She denies emesis, abdominal pain, headache, loss of appetite, vaginal discharge, dysuria, pelvic pain, urinary urgency, lightheadedness, urinary frequency, vaginal bleeding, hemorrhoids and constipation.     Additional History: PCOS, mild depression-not on meds, hypothyroidism-on levothyroxine 50mcg, managed by pcp.  SEPTIC MAB 10w needing D&C 21  Currently is training for a marathon in October   Tinnitus-ongoing     She reports that she is having more sx w/  pregnancy as did before  preg that ended in miscarriage. Says that she is more bloated, nausea, and threw up 2x one day but o/w no emesis. Really tired. All seems to be mostly peaked and now fairly stable though not necessarily improving yet.    Has been taking unisom to help her go to bed and vit B6 for nausea relief. Says that the meds last until about 5-6 pm and then has more bloating and nausea and food aversions, etc.  Eating a lot more carbs than typically would as can't eat chicken, some cooked red meat.  Patient has been eating cottage cheese this is a protein that she can keep down.   Patient declines need for other medications for nausea and emesis at this point.    Patient has some baseline mild dep/anxiety but not on meds at any point. Declines need for any at this time.  Her PCP was managing her thyroid but would prefer to do that mgmt here since coming her for the preg     Have you travelled during the pregnancy?No  Have your sexual partner(s) travelled during the pregnancy?No        HISTORY:   Planned Pregnancy: No  Marital Status:   Occupation:  at [employer]  Living in Household: Spouse     Past History:  Her past medical  "history   Past Medical History        Past Medical History:   Diagnosis Date     Depressive disorder       History of PCOS 2011     had amenorrhea in college. pt reports hirsutism most of her life but also \"very skinny\" in college and occasionally had cysts. normal and regular periods in adult life     Hypothyroidism 2011     Mild depression (H)       Tinnitus, bilateral        .       She has a history of  SAB x1     Since her last LMP she denies use of alcohol, tobacco and street drugs.     Past medical, surgical, social and family history were reviewed and updated in [facility].               Current Outpatient Medications   Medication     levothyroxine (SYNTHROID/LEVOTHROID) 50 MCG tablet     Prenatal Vit-Fe Fumarate-FA (PRENATAL MULTIVITAMIN W/IRON) 27-0.8 MG tablet      No current facility-administered medications for this visit.         ROS:   12 point review of systems negative other than symptoms noted below or in the HPI.  Constitutional: Fatigue  Gastrointestinal: Nausea        OBJECTIVE:      EXAM:  LMP 06/09/2022  Body mass index is 21.3 kg/m .     Nurse phone visit completed. Prenatal book and folder (containing standard educational hand-outs and brochures)  will be given at the next visit to patient. Information in folder reviewed over the phone. Questions answered. Brochure given on optional screening available to assess chromosomal anomalies. Pt desires  NIPS. Pt advised to call the clinic if she has any questions or concerns related to her pregnancy. Prenatal labs future ordered. New prenatal visit scheduled on 8/2/22 with .        No results found for: PAP  PHQ-9 score:    PHQ 7/28/2022   PHQ-9 Total Score 1   Q9: Thoughts of better off dead/self-harm past 2 weeks Not at all                     DORCAS-7 SCORE 12/1/2021 7/28/2022   Total Score 1 (minimal anxiety) 0 (minimal anxiety)   Total Score 1 0            Patient supplied answers from flow sheet for:  Prenatal OB Questionnaire.  Past Medical " History  Have you ever recieved care for your mental health? : (!) Yes  Have you ever been in a major accident or suffered serious trauma?: No  Within the last year, has anyone hit, slapped, kicked or otherwise hurt you?: No  In the last year, has anyone forced you to have sex when you didn't want to?: No     Past Medical History 2   Have you ever received a blood transfusion?: No  Would you accept a blood transfusion if was medically recommended?: Yes  Does anyone in your home smoke?: No   Is your blood type Rh negative?: Unknown  Have you ever ?: No  Have you been hospitalized for a nonsurgical reason excluding normal delivery?: No  Have you ever had an abnormal pap smear?: No     Past Medical History (Continued)  Do you have a history of abnormalities of the uterus?: No  Did your mother take SONY or any other hormones when she was pregnant with you?: No  Do you have any other problems we have not asked about which you feel may be important to this pregnancy?: No        Viky Martinez RN      Answers for HPI/ROS submitted by the patient on 8/12/2022  If you checked off any problems, how difficult have these problems made it for you to do your work, take care of things at home, or get along with other people?: Not difficult at all  PHQ9 TOTAL SCORE: 0  DORCAS 7 TOTAL SCORE: 0         OBJECTIVE:     EXAM:  /68   Wt 61.7 kg (136 lb)   LMP 06/09/2022   BMI 21.30 kg/m   Body mass index is 21.3 kg/m .    GENERAL: healthy, alert and no distress  EYES: Eyes grossly normal to inspection, PERRL and conjunctivae and sclerae normal  NECK: no adenopathy, no asymmetry, masses, or scars and thyroid normal to palpation  RESP: lungs clear to auscultation - no rales, rhonchi or wheezes  CV: regular rate and rhythm, normal S1 S2, no S3 or S4, no murmur, click or rub, no peripheral edema and peripheral pulses strong  ABDOMEN: soft, nontender, no hepatosplenomegaly, no masses and bowel sounds normal  MS: no gross  musculoskeletal defects noted, no edema  SKIN: no suspicious lesions or rashes  NEURO: Normal strength and tone, mentation intact and speech normal  PSYCH: mentation appears normal, affect normal/bright    ASSESSMENT/PLAN:       ICD-10-CM    1. Supervision of high risk pregnancy in first trimester  O09.91    2. Hypothyroid in pregnancy, antepartum, first trimester  O99.281 TSH    E03.9        32 year old , On August 15, 2022 patient is 9w4d weeks of pregnancy with CHELSEY of 3/16/2023, by Last Menstrual Period and the U/S is c/w this and measuring 9+3 with strong FHTs and patient is very relieved given h.o SAB    Counseling given:   - Follow up in 3 weeks for return OB visit.  - Recommended weight gain for pregnancy: 25-35 lbs.         PLAN/PATIENT INSTRUCTIONS:    IUP @ 9+4 with viable and dates c/w LMP U/S so patient is very reassured. EDC as per LMP so 3/16    Discussed with her that NIPT is a screening test. It has a high sensitivity of >99% for Trisomy 21, 18, 13, and X/Y. Therefore a normal result is very reassuring but there can be false positives as well as false negatives and the only diagnostic test of all 23 pairs of chromosomes is CVS or amniocentesis. Patient understands the pros/cons and limitations and would like to proceed with it.  Discussed sex chrome testing and finding out, etc patient is fine to find out via [email or web address] but wants to then just open the link with her  rather than have me write it in the message to them so that she knows when they're finding out that they're together.    Return this Friday when 10+1 for NIPT and then will do NOB labs. Will also check TSH and discussed goal of 1-2.5 for the TSH and intervals for testing    Reviewed flow of future appts and which ones have ultrasounds  Discussed call and availability for myself to be at delivery vs a partner covering, etc    Discussed nausea/vomiting, frequent small meals, high carb but with protein to avoid sugar spikes and  drops, etc  Declines Rx for zofran but if sx worsen then should call/[employer] message and can send Rx in for her    Return 3 weeks     Radha Iraheta MD

## 2022-08-19 ENCOUNTER — LAB (OUTPATIENT)
Dept: LAB | Facility: CLINIC | Age: 32
End: 2022-08-19
Payer: COMMERCIAL

## 2022-08-19 DIAGNOSIS — E03.9 HYPOTHYROID IN PREGNANCY, ANTEPARTUM, FIRST TRIMESTER: ICD-10-CM

## 2022-08-19 DIAGNOSIS — Z13.71 SCREENING FOR GENETIC DISEASE CARRIER STATUS: Primary | ICD-10-CM

## 2022-08-19 DIAGNOSIS — Z34.01 ENCOUNTER FOR SUPERVISION OF NORMAL FIRST PREGNANCY IN FIRST TRIMESTER: ICD-10-CM

## 2022-08-19 DIAGNOSIS — O99.281 HYPOTHYROID IN PREGNANCY, ANTEPARTUM, FIRST TRIMESTER: ICD-10-CM

## 2022-08-19 LAB
ABO/RH(D): NORMAL
ANTIBODY SCREEN: NEGATIVE
ERYTHROCYTE [DISTWIDTH] IN BLOOD BY AUTOMATED COUNT: 12.3 % (ref 10–15)
HBV SURFACE AG SERPL QL IA: NONREACTIVE
HCT VFR BLD AUTO: 42.7 % (ref 35–47)
HCV AB SERPL QL IA: NONREACTIVE
HGB BLD-MCNC: 14.3 G/DL (ref 11.7–15.7)
HIV 1+2 AB+HIV1 P24 AG SERPL QL IA: NONREACTIVE
MCH RBC QN AUTO: 28 PG (ref 26.5–33)
MCHC RBC AUTO-ENTMCNC: 33.5 G/DL (ref 31.5–36.5)
MCV RBC AUTO: 84 FL (ref 78–100)
PLATELET # BLD AUTO: 269 10E3/UL (ref 150–450)
RBC # BLD AUTO: 5.1 10E6/UL (ref 3.8–5.2)
SPECIMEN EXPIRATION DATE: NORMAL
T PALLIDUM AB SER QL: NONREACTIVE
TSH SERPL DL<=0.005 MIU/L-ACNC: 0.64 MU/L (ref 0.4–4)
WBC # BLD AUTO: 6.5 10E3/UL (ref 4–11)

## 2022-08-19 PROCEDURE — 86900 BLOOD TYPING SEROLOGIC ABO: CPT

## 2022-08-19 PROCEDURE — 87340 HEPATITIS B SURFACE AG IA: CPT

## 2022-08-19 PROCEDURE — 85027 COMPLETE CBC AUTOMATED: CPT

## 2022-08-19 PROCEDURE — 86901 BLOOD TYPING SEROLOGIC RH(D): CPT

## 2022-08-19 PROCEDURE — 84443 ASSAY THYROID STIM HORMONE: CPT

## 2022-08-19 PROCEDURE — 86850 RBC ANTIBODY SCREEN: CPT

## 2022-08-19 PROCEDURE — 87389 HIV-1 AG W/HIV-1&-2 AB AG IA: CPT

## 2022-08-19 PROCEDURE — 86780 TREPONEMA PALLIDUM: CPT

## 2022-08-19 PROCEDURE — 86762 RUBELLA ANTIBODY: CPT

## 2022-08-19 PROCEDURE — 36415 COLL VENOUS BLD VENIPUNCTURE: CPT

## 2022-08-19 PROCEDURE — 86803 HEPATITIS C AB TEST: CPT

## 2022-08-22 LAB
RUBV IGG SERPL QL IA: 1.44 INDEX
RUBV IGG SERPL QL IA: POSITIVE

## 2022-08-22 RX ORDER — LEVOTHYROXINE SODIUM 25 UG/1
25 TABLET ORAL DAILY
Qty: 90 TABLET | Refills: 0 | Status: SHIPPED | OUTPATIENT
Start: 2022-08-22 | End: 2022-10-03

## 2022-08-22 NOTE — RESULT ENCOUNTER NOTE
"Nory,    All of your routine first OB labs are normal.    It is normal to have a \"positive\" rubella titer, means your MMR is working.  Your thyroid is appearing slightly overcorrected though normal. The ideal range for your TSH is 1-2.5 and you're at 0.6. I'm going to have you cut back to 25mcg and then we'll repeat the TSH again at 16 weeks or so. It's nothing to worry about as the TSH is technically normal, just slightly more than needed for ideal goal range.    I'll be in touch with the genetic testing as soon as it's back.    Radha Iraheta MD    "

## 2022-08-26 LAB — SCANNED LAB RESULT: NORMAL

## 2022-08-26 NOTE — RESULT ENCOUNTER NOTE
Nory,        Your NIPT results are back and they are detecting no abnormality of chromosome counts at 13, 18, 21 and the X and Y.  This is considered a screening test, so therefore never 100%,  but it is highly sensitive and is able to accurately detect abnormalities, or rule out abnormalities,  in greater than 99% of cases. So this is great news!    If you want to know the baby's sex, you can click on the hyperlink when you're with Dejan so you can find out together!      Radha Iraheta MD

## 2022-09-01 ENCOUNTER — MYC MEDICAL ADVICE (OUTPATIENT)
Dept: OBGYN | Facility: CLINIC | Age: 32
End: 2022-09-01

## 2022-09-01 ENCOUNTER — PRENATAL OFFICE VISIT (OUTPATIENT)
Dept: OBGYN | Facility: CLINIC | Age: 32
End: 2022-09-01
Payer: COMMERCIAL

## 2022-09-01 VITALS — BODY MASS INDEX: 21.52 KG/M2 | DIASTOLIC BLOOD PRESSURE: 64 MMHG | SYSTOLIC BLOOD PRESSURE: 102 MMHG | WEIGHT: 137.4 LBS

## 2022-09-01 DIAGNOSIS — E03.9 HYPOTHYROID IN PREGNANCY, ANTEPARTUM: ICD-10-CM

## 2022-09-01 DIAGNOSIS — O09.92 SUPERVISION OF HIGH RISK PREGNANCY IN SECOND TRIMESTER: Primary | ICD-10-CM

## 2022-09-01 DIAGNOSIS — O99.280 HYPOTHYROID IN PREGNANCY, ANTEPARTUM: ICD-10-CM

## 2022-09-01 PROCEDURE — 87491 CHLMYD TRACH DNA AMP PROBE: CPT | Performed by: STUDENT IN AN ORGANIZED HEALTH CARE EDUCATION/TRAINING PROGRAM

## 2022-09-01 PROCEDURE — 87591 N.GONORRHOEAE DNA AMP PROB: CPT | Performed by: STUDENT IN AN ORGANIZED HEALTH CARE EDUCATION/TRAINING PROGRAM

## 2022-09-01 PROCEDURE — 99207 PR PRENATAL VISIT: CPT | Performed by: STUDENT IN AN ORGANIZED HEALTH CARE EDUCATION/TRAINING PROGRAM

## 2022-09-01 NOTE — PROGRESS NOTES
Prenatal Care (12w0d/)    Nory Woods is a 32 year old  at 12w0d by LMP c/w 7w3d US presenting for routine prenatal care. She is doing well. Nausea improving. Denies LOF, VB, ctx.     Conditions affecting pregnancy:  - Hypothyroidism., on synthroid 50mcg    Objective- see flow sheet    Nory Woods is a 32 year old  at 12w0d presenting for routine ob visit.       ICD-10-CM    1. Supervision of high risk pregnancy in second trimester  O09.92 NEISSERIA GONORRHOEA PCR     CHLAMYDIA TRACHOMATIS PCR     NEISSERIA GONORRHOEA PCR     CHLAMYDIA TRACHOMATIS PCR   2. Hypothyroid in pregnancy, antepartum  O99.280     E03.9        - First tri labs wnl.  Anatomy US ordered. S/p COVID-19 vaccine x 3. Due for influenza.   - Discussed routine precautions, exercise, and sleeping position guidelines.   - TWlbs. Pregravid BMI 21. Expect 25-35lbs.  - Hypothyroidism- normal TSH. On Synthroid.     Follow-up in four weeks.     January Lorenzo MD, S  22     
(3) adequate

## 2022-09-01 NOTE — TELEPHONE ENCOUNTER
Spoke with pt regarding coding of high risk pregnancy. Questions answered  Kim Clinton RN on 9/1/2022 at 4:24 PM

## 2022-09-02 LAB
C TRACH DNA SPEC QL NAA+PROBE: NEGATIVE
N GONORRHOEA DNA SPEC QL NAA+PROBE: NEGATIVE

## 2022-09-08 ENCOUNTER — ALLIED HEALTH/NURSE VISIT (OUTPATIENT)
Dept: NURSING | Facility: CLINIC | Age: 32
End: 2022-09-08
Payer: COMMERCIAL

## 2022-09-08 DIAGNOSIS — Z34.01 ENCOUNTER FOR SUPERVISION OF NORMAL FIRST PREGNANCY IN FIRST TRIMESTER: Primary | ICD-10-CM

## 2022-09-08 PROCEDURE — 99207 PR NO CHARGE NURSE ONLY: CPT

## 2022-09-08 NOTE — NURSING NOTE
13w0d    Pt here today for a FHR check w doppler per pt request.  States she has been feeling bloated and mild lower abdominal cramping the last few days.  Denies VB or discharge.  Hx of SAB makes her nervous and wanting a FHR check for reassurance.    Doppler used and     Encouraged to push fluids and stay regular. Could just be normal stretching in pregnancy and if irregular BM's, could be intestinal cramping.    Pt verbalized understanding, in agreement with plan, and voiced no further questions.  Home ambulatory    Susan Morocho RN on 9/8/2022 at 10:24 AM     show

## 2022-09-12 ENCOUNTER — TELEPHONE (OUTPATIENT)
Dept: OBGYN | Facility: CLINIC | Age: 32
End: 2022-09-12

## 2022-09-12 NOTE — TELEPHONE ENCOUNTER
Informed of recommendations. Pt is declining FHTs at this time-was offered appt.  Was very reassured after speaking with Susan GONZALEZ and with Dr. Iraheta recommendations.   Kim Clinton RN on 9/12/2022 at 1:20 PM

## 2022-09-12 NOTE — TELEPHONE ENCOUNTER
Pt is 13+4 wks, feeling some cramping and would like some reassurance that this is normal from a nurse    Please callback: [phone]

## 2022-09-12 NOTE — TELEPHONE ENCOUNTER
8/15/22  viable IUP  Heartbeat check last week which was normal  Pt admits to be nervous and just wants reassurance    Experiencing a dull cramping, across her whole stomach sometimes over the last week; feels bloated as well.  Diarrhea yesterday and admits felt crampy then too.   No vaginal bleeding or discharge.    Reassured pt to push fluids and lay low. Mild cramping is common and could also be intestinal w constipation OR diarrhea. Replace fluids and monitor.   Pt reassured.    Pt also mentioned that her last TSH 8/19/22 was low and had to cut her levothyroid dose to 25mcg daily. She is noticing she gets cold faster and her hands are cold - asking if she should have an earlier repeat TSH to ensure dose is appropriate.    Routing to Dr Hansen to advise on above TSH/thyroid question.    Susan Morocho RN on 9/12/2022 at 10:12 AM

## 2022-09-12 NOTE — TELEPHONE ENCOUNTER
She can do fhts via a triage in office appointment or if a cnm or partner have an opening as I don't  Cramping is normal and uterine stretching and jsut last week with +fhts and no bleeding is not going to be an SAB but doptones could be done    No the cold stuff is pregnancy changes and everything else. To see if TSH is appropriate 6 weeks after dose adjustments is whats recommended

## 2022-09-23 ENCOUNTER — MYC MEDICAL ADVICE (OUTPATIENT)
Dept: OBGYN | Facility: CLINIC | Age: 32
End: 2022-09-23

## 2022-09-23 ENCOUNTER — TELEPHONE (OUTPATIENT)
Dept: OBGYN | Facility: CLINIC | Age: 32
End: 2022-09-23

## 2022-09-23 ENCOUNTER — ALLIED HEALTH/NURSE VISIT (OUTPATIENT)
Dept: NURSING | Facility: CLINIC | Age: 32
End: 2022-09-23
Payer: COMMERCIAL

## 2022-09-23 DIAGNOSIS — Z34.01 ENCOUNTER FOR SUPERVISION OF NORMAL FIRST PREGNANCY IN FIRST TRIMESTER: Primary | ICD-10-CM

## 2022-09-23 PROCEDURE — 99207 PR NO CHARGE NURSE ONLY: CPT

## 2022-09-23 NOTE — TELEPHONE ENCOUNTER
MEDICARE WELLNESS VISIT NOTE    HISTORY OF PRESENT ILLNESS:   Pearl Butler presents for her Subsequent Annual Medicare Wellness Visit.   She has no current complaints or concerns.      Patient Care Team:  lKaus Brown DO as PCP - General        Patient Active Problem List   Diagnosis   • Arthralgia of multiple sites   • Coronary artery disease involving native coronary artery   • Environmental allergies   • Glaucoma   • Hyperlipemia, mixed   • Primary osteoarthritis of both knees   • Vitamin D deficiency   • Essential hypertension, benign   • Glaucoma of both eyes   • Supraventricular tachycardia (CMS/HCC)   • Diastolic CHF (CMS/HCC)   • Mitral regurgitation   • Melanoma of skin (CMS/HCC)   • Hyponatremia   • Thyroid nodule-incidental finding CT 2/24/20         Past Medical History:   Diagnosis Date   • Abnormal liver function test    • Allergy     environmental   • Arthralgia of multiple sites    • CAD (coronary artery disease)     PCI 2002 to LAD   • Cataract    • Diastolic CHF (CMS/HCC)     2016 due to salt excess   • Environmental allergies    • Glaucoma     Dr. Bell   • HTN (hypertension)    • Hyperlipemia, mixed    • Hyperlipidemia    • Hyponatremia     Severe hyponatremia with aldactone/ hctz combo 9/15   • Labile blood pressure    • Lung nodule     Ivanovich   • Mitral regurgitation     mod MR on Echo 10/17   • Osteoarthritis of both knees    • Osteoporosis    • Skin cancer     face - Melanoma follows with [facility]    • SVT (supraventricular tachycardia) (CMS/HCC)     NSVT, SVT short runs on Event 5/17   • Ventricular ectopy    • Vitamin D deficiency          Past Surgical History:   Procedure Laterality Date   • Bowel resection      8/13   • Breast biopsy  2005   • Cataract extraction, bilateral  2016:B/L   • Colectomy Left 2013    Large intestine-benign   • Laser surgery of eye  03/06/2018    Selective laser trabeculoplasty, left eye   • Ptca  2002    LAD?Stent   • Thyroid biopsy     •  Patient sent Missingamest message back. appt scheduled for 1pm today for nurse only for fetal hearttones.    Viky Martinez RN     Total knee arthroplasty  03/05/2018    left   • Total knee arthroplasty  01/12/2017    right   • Vaginal hysterectomy      Benign bleeding         Social History     Tobacco Use   • Smoking status: Never Smoker   • Smokeless tobacco: Never Used   Vaping Use   • Vaping Use: never used   Substance Use Topics   • Alcohol use: Not Currently   • Drug use: No     Drug use:    Drug Use:    No              Family History - reviewed    Current Outpatient Medications   Medication Sig Dispense Refill   • metoPROLOL tartrate (LOPRESSOR) 50 MG tablet TAKE 1 TABLET EVERY MORNING AND 2 TABLETS IN THE EVENING 270 tablet 1   • lisinopril (ZESTRIL) 20 MG tablet Take 1 tablet by mouth daily. 90 tablet 1   • montelukast (SINGULAIR) 10 MG tablet TAKE ONE TABLET BY MOUTH ONE TIME DAILY 90 tablet 1   • NIFEdipine XL (PROCARDIA XL) 30 MG 24 hr tablet Take 1 tablet by mouth daily. 90 tablet 1   • atorvastatin (LIPITOR) 80 MG tablet TAKE ONE TABLET BY MOUTH ONE TIME DAILY  90 tablet 1   • latanoprostene (Vyzulta) 0.024 % opthalmic solution Place 1 drop into both eyes nightly.     • potassium chloride (KLOR-CON) 10 MEQ ER tablet Take 1 tablet by mouth daily as needed (only when taking furosemide). Take one tablet by mouth only when taking furosemide prn 30 tablet 1   • furosemide (LASIX) 20 MG tablet Take 1 tablet by mouth as needed (swelling). 30 tablet 3   • Multiple Vitamins-Minerals (PRESERVISION AREDS 2 PO) Take 1 tablet by mouth 2 times daily.     • acetaminophen (TYLENOL) 325 MG tablet 1 tablet as needed Orally prn     • Probiotic Product (ALIGN) 4 MG Cap as directed Orally     • Calcium Carb-Cholecalciferol (CALCIUM 600 + D) 600-200 MG-UNIT Tab 1 tablet with food Orally Once a day     • brimonidine (ALPHAGAN P) 0.1 % ophthalmic solution Place 1 drop into both eyes 3 times daily.      • dorzolamide-timolol (COSOPT) 22.3-6.8 MG/ML ophthalmic solution Place 1 drop into both eyes 2 times daily.      • aspirin (ECOTRIN) 81 MG EC tablet  Take 81 mg by mouth daily.      • b complex vitamins tablet Take 1 tablet by mouth.     • ibuprofen (MOTRIN) 200 MG tablet Take 800 mg by mouth every 6 hours as needed.      • Omega-3 Fatty Acids (FISH OIL CONCENTRATE PO) Take 1,200 mg by mouth daily.      • MAGNESIUM PO Take 250 mg by mouth 2 times daily.        No current facility-administered medications for this visit.        The following items on the Medicare Health Risk Assessment were found to be positive  1.) Do you have an Advance directive, living will, or power of  for health care document that contains your wishes for end of life care?: No     2.) Would you like additional information on advance directives?: Yes         Vision and Hearing screens: Hearing screening was performed and reviewed.  Vision screening was not able to be completed today due to patient's pre-existing vision condition    Advance Directive:   The patient has the following documents:  No Advance Directives on file. Patient offered documents.    Cognitive/Functional Status: no evidence of cognitive dysfunction by direct observation    Opioid Review: Pearl is not taking opioid medications.    Recent PHQ 2/9 Score:    PHQ 2:  Date Adult PHQ 2 Score Adult PHQ 2 Interpretation   10/22/2021 0 No further screening needed       PHQ 9:       DEPRESSION ASSESSMENT/PLAN:  Depression screening is negative no further plan needed.     Body mass index is 25.13 kg/m².    BMI ASSESSMENT/PLAN:  Patient BMI is within normal range.       Needed follow up:  None  Health Maintenance Due   Topic Date Due   • COVID-19 Vaccine (3 - Moderna risk 3-dose series) 04/02/2021   • Medicare Wellness Visit  10/14/2021       See orders.   See Patient Instructions section.   No follow-ups on file.

## 2022-09-23 NOTE — TELEPHONE ENCOUNTER
Attempted to call back.  Left detailed vm to call back - if just for reassurance, needs apt w Dr Iraheta for heartbeat check.    Susan Morocho RN on 9/23/2022 at 8:47 AM

## 2022-09-23 NOTE — NURSING NOTE
Pt here for FHT's for reassurance  Hx of SAB and nervous as all her pregnancy sx just disappeared in one day.    FHR found in 150's and pt feeling reassured.    Pt discharged knowing she can call w any concerns.  Susan Morocho RN on 9/23/2022 at 1:09 PM

## 2022-09-28 ENCOUNTER — MYC MEDICAL ADVICE (OUTPATIENT)
Dept: OBGYN | Facility: CLINIC | Age: 32
End: 2022-09-28

## 2022-09-30 ENCOUNTER — PRENATAL OFFICE VISIT (OUTPATIENT)
Dept: OBGYN | Facility: CLINIC | Age: 32
End: 2022-09-30
Payer: COMMERCIAL

## 2022-09-30 VITALS — DIASTOLIC BLOOD PRESSURE: 68 MMHG | WEIGHT: 143 LBS | SYSTOLIC BLOOD PRESSURE: 108 MMHG | BODY MASS INDEX: 22.4 KG/M2

## 2022-09-30 DIAGNOSIS — O09.92 SUPERVISION OF HIGH RISK PREGNANCY IN SECOND TRIMESTER: Primary | ICD-10-CM

## 2022-09-30 DIAGNOSIS — O99.282 HYPOTHYROIDISM IN PREGNANCY, ANTEPARTUM, SECOND TRIMESTER: ICD-10-CM

## 2022-09-30 DIAGNOSIS — Z36.1 NEED FOR MATERNAL SERUM ALPHA-PROTEIN (MSAFP) SCREENING: ICD-10-CM

## 2022-09-30 DIAGNOSIS — E03.9 HYPOTHYROIDISM IN PREGNANCY, ANTEPARTUM, SECOND TRIMESTER: ICD-10-CM

## 2022-09-30 LAB — TSH SERPL DL<=0.005 MIU/L-ACNC: 1.6 MU/L (ref 0.4–4)

## 2022-09-30 PROCEDURE — 99207 PR PRENATAL VISIT: CPT | Performed by: OBSTETRICS & GYNECOLOGY

## 2022-09-30 PROCEDURE — 82105 ALPHA-FETOPROTEIN SERUM: CPT | Mod: 90 | Performed by: OBSTETRICS & GYNECOLOGY

## 2022-09-30 PROCEDURE — 99000 SPECIMEN HANDLING OFFICE-LAB: CPT | Performed by: OBSTETRICS & GYNECOLOGY

## 2022-09-30 PROCEDURE — 84443 ASSAY THYROID STIM HORMONE: CPT | Performed by: OBSTETRICS & GYNECOLOGY

## 2022-09-30 PROCEDURE — 36415 COLL VENOUS BLD VENIPUNCTURE: CPT | Performed by: OBSTETRICS & GYNECOLOGY

## 2022-09-30 NOTE — PROGRESS NOTES
Patient is being seen today for 16 week OB appointment.   Not feeling well today with a persistent URI sx. Still sneezing and congestion, cough, sore throat  Discussed  Safe otc meds to use for sx relief.  Felt that the 4 week wait until 16 weeks was a little too long, very happy that had another FHR check in between for peace of mind and appreciates us getting her in for that.   Discussed expectations for FM in the coming weeks  Discussed AFP testing and would like to proceed with that  Also will repeat TSH today since weaned down to 25mcg from 50mcg at 10 weeks  2 weeks ago was feeling really light headed. Discussed BP changes in pregnancy, hypotension, hydration, orthostatics, what is normal and what should be evaluated  Return 4 weeks with anatomy scan

## 2022-10-03 DIAGNOSIS — O99.281 HYPOTHYROID IN PREGNANCY, ANTEPARTUM, FIRST TRIMESTER: ICD-10-CM

## 2022-10-03 DIAGNOSIS — E03.9 HYPOTHYROID IN PREGNANCY, ANTEPARTUM, FIRST TRIMESTER: ICD-10-CM

## 2022-10-03 RX ORDER — LEVOTHYROXINE SODIUM 25 UG/1
25 TABLET ORAL DAILY
Qty: 90 TABLET | Refills: 1 | Status: SHIPPED | OUTPATIENT
Start: 2022-10-03 | End: 2023-02-18

## 2022-10-03 NOTE — RESULT ENCOUNTER NOTE
Nory,    Your TSH is great. Ideal range is 1-2.5. so I just sent in more refills on the 25mcg levothyroxine. We will repeat the TSH at 28 weeks when you do your gestational diabetes test.    Radha Iraheta MD

## 2022-10-04 LAB
# FETUSES US: NORMAL
AFP MOM SERPL: 0.94
AFP SERPL-MCNC: 34 NG/ML
AGE - REPORTED: 30.7 YR
CURRENT SMOKER: NO
FAMILY MEMBER DISEASES HX: NO
GA METHOD: NORMAL
GA: NORMAL WK
IDDM PATIENT QL: NO
INTEGRATED SCN PATIENT-IMP: NORMAL
SPECIMEN DRAWN SERPL: NORMAL

## 2022-10-05 NOTE — RESULT ENCOUNTER NOTE
Nory,    Your AFP test for spina bifida and conditions like it is coming back with a very low risk of 1 in 10,000, which is great news!    Radha Iraheta MD

## 2022-10-28 ENCOUNTER — PRENATAL OFFICE VISIT (OUTPATIENT)
Dept: OBGYN | Facility: CLINIC | Age: 32
End: 2022-10-28
Payer: COMMERCIAL

## 2022-10-28 ENCOUNTER — ANCILLARY PROCEDURE (OUTPATIENT)
Dept: ULTRASOUND IMAGING | Facility: CLINIC | Age: 32
End: 2022-10-28
Payer: COMMERCIAL

## 2022-10-28 ENCOUNTER — TRANSCRIBE ORDERS (OUTPATIENT)
Dept: MATERNAL FETAL MEDICINE | Facility: HOSPITAL | Age: 32
End: 2022-10-28

## 2022-10-28 VITALS — SYSTOLIC BLOOD PRESSURE: 108 MMHG | DIASTOLIC BLOOD PRESSURE: 62 MMHG | BODY MASS INDEX: 23.71 KG/M2 | WEIGHT: 151.4 LBS

## 2022-10-28 DIAGNOSIS — O26.90 PREGNANCY RELATED CONDITION, ANTEPARTUM: Primary | ICD-10-CM

## 2022-10-28 DIAGNOSIS — O43.92 ABNORMAL PLACENTA AFFECTING MANAGEMENT OF MOTHER IN SECOND TRIMESTER: ICD-10-CM

## 2022-10-28 DIAGNOSIS — O28.3 ABNORMAL FETAL ULTRASOUND: ICD-10-CM

## 2022-10-28 DIAGNOSIS — O09.92 SUPERVISION OF HIGH RISK PREGNANCY IN SECOND TRIMESTER: ICD-10-CM

## 2022-10-28 DIAGNOSIS — O09.92 SUPERVISION OF HIGH RISK PREGNANCY IN SECOND TRIMESTER: Primary | ICD-10-CM

## 2022-10-28 DIAGNOSIS — O99.282 HYPOTHYROIDISM IN PREGNANCY, ANTEPARTUM, SECOND TRIMESTER: ICD-10-CM

## 2022-10-28 DIAGNOSIS — E03.9 HYPOTHYROIDISM IN PREGNANCY, ANTEPARTUM, SECOND TRIMESTER: ICD-10-CM

## 2022-10-28 PROCEDURE — 99207 PR PRENATAL VISIT: CPT | Performed by: OBSTETRICS & GYNECOLOGY

## 2022-10-28 PROCEDURE — 76805 OB US >/= 14 WKS SNGL FETUS: CPT | Performed by: OBSTETRICS & GYNECOLOGY

## 2022-10-28 NOTE — PROGRESS NOTES
Patient is being seen today for her 20w OB appointment.   Anatomy scan done and overall anatomy is normal and initially the stomach was normal but by the end of the scan the sonographer noted an echogenic focus in the stomach. There was also a 2.5cm hypoechoic area in the placenta that is most c/w placental venous lake but it is fairly close to the CI site.  Discussed both of these findings with her and the potential etiologies and implications. Offered to just repeat scan in 4 weeks here at her next visit or refer to MFM for LII and discussion of findings  Feel that both issues are likely going to be of not significant concern so tried to reassure patient regarding this as she has a lot of anxiety s/p SAB, etc  Thinks she is having BH and sometimes gets them randomly but  more noticeable after sex. Discussed what is normal and not and when to call for concerns.   Round lig pain, and cramping more when exercising. Reassured that's normal.   Discussed eating guidelines for GCT at 28w.   Going to [place], [place] next weekend, last vacation for her this year. Reviewed masking, moving around, and compression socks during flight and general VTE minimization of risk  Feeling FM now  Plan to recheck TSH at 28 weeks  Return 4 wks

## 2022-10-31 ENCOUNTER — PRE VISIT (OUTPATIENT)
Dept: MATERNAL FETAL MEDICINE | Facility: CLINIC | Age: 32
End: 2022-10-31

## 2022-11-03 ENCOUNTER — OFFICE VISIT (OUTPATIENT)
Dept: MATERNAL FETAL MEDICINE | Facility: CLINIC | Age: 32
End: 2022-11-03
Attending: OBSTETRICS & GYNECOLOGY
Payer: COMMERCIAL

## 2022-11-03 ENCOUNTER — HOSPITAL ENCOUNTER (OUTPATIENT)
Dept: ULTRASOUND IMAGING | Facility: CLINIC | Age: 32
Discharge: HOME | End: 2022-11-03
Attending: OBSTETRICS & GYNECOLOGY
Payer: COMMERCIAL

## 2022-11-03 DIAGNOSIS — Z36.2 ENCOUNTER FOR FOLLOW-UP ULTRASOUND OF FETAL ANATOMY: ICD-10-CM

## 2022-11-03 DIAGNOSIS — O35.9XX0 SUSPECTED FETAL ABNORMALITY AFFECTING MANAGEMENT OF MOTHER, SINGLE OR UNSPECIFIED FETUS: Primary | ICD-10-CM

## 2022-11-03 DIAGNOSIS — O26.90 PREGNANCY RELATED CONDITION, ANTEPARTUM: ICD-10-CM

## 2022-11-03 PROCEDURE — 76811 OB US DETAILED SNGL FETUS: CPT | Mod: 26 | Performed by: OBSTETRICS & GYNECOLOGY

## 2022-11-03 PROCEDURE — 76811 OB US DETAILED SNGL FETUS: CPT

## 2022-11-03 PROCEDURE — 99202 OFFICE O/P NEW SF 15 MIN: CPT | Mod: 25 | Performed by: OBSTETRICS & GYNECOLOGY

## 2022-11-04 DIAGNOSIS — O35.9XX0 SUSPECTED FETAL ABNORMALITY AFFECTING MANAGEMENT OF MOTHER, SINGLE OR UNSPECIFIED FETUS: Primary | ICD-10-CM

## 2022-11-04 NOTE — PROGRESS NOTES
"Please see \"Imaging\" tab under \"Chart Review\" for details of today's US.    Daisy Gooden, DO  "

## 2022-11-17 ENCOUNTER — OFFICE VISIT (OUTPATIENT)
Dept: PEDIATRIC CARDIOLOGY | Facility: CLINIC | Age: 32
End: 2022-11-17

## 2022-11-17 ENCOUNTER — HOSPITAL ENCOUNTER (OUTPATIENT)
Dept: CARDIOLOGY | Facility: CLINIC | Age: 32
Discharge: HOME | End: 2022-11-17
Attending: OBSTETRICS & GYNECOLOGY | Admitting: OBSTETRICS & GYNECOLOGY
Payer: COMMERCIAL

## 2022-11-17 DIAGNOSIS — O35.9XX0 SUSPECTED FETAL ABNORMALITY AFFECTING MANAGEMENT OF MOTHER, SINGLE OR UNSPECIFIED FETUS: ICD-10-CM

## 2022-11-17 DIAGNOSIS — O35.BXX0 ANOMALY OF HEART OF FETUS AFFECTING PREGNANCY, ANTEPARTUM, SINGLE OR UNSPECIFIED FETUS: Primary | ICD-10-CM

## 2022-11-17 PROCEDURE — 93325 DOPPLER ECHO COLOR FLOW MAPG: CPT | Mod: 26 | Performed by: PEDIATRICS

## 2022-11-17 PROCEDURE — 93325 DOPPLER ECHO COLOR FLOW MAPG: CPT

## 2022-11-17 PROCEDURE — 76825 ECHO EXAM OF FETAL HEART: CPT | Mod: 26 | Performed by: PEDIATRICS

## 2022-11-17 PROCEDURE — 76827 ECHO EXAM OF FETAL HEART: CPT | Mod: 26 | Performed by: PEDIATRICS

## 2022-11-17 PROCEDURE — 99203 OFFICE O/P NEW LOW 30 MIN: CPT | Mod: 25 | Performed by: PEDIATRICS

## 2022-11-17 NOTE — LETTER
2022      RE: Nory Woods  [address] [address]     Dear Colleague,    Thank you for the opportunity to participate in the care of your patient, Nory Woods, at the [facility] at [facility]. Please see a copy of my visit note below.    [facility]   Heart Center Fetal Consult Note    Patient:  Nory Woods MRN:  [id number]   YOB: 1990 Age:  32 year old   Date of Visit:  2022 [facility]  [facility]     Dear Doctor:    I had the pleasure of seeing Nory Woods at the [facility] on 2022 in fetal cardiology consultation for fetal echocardiogram results. She presented today accompanied by her . As you know, she is a 32 year old  at 23w0d who presented for fetal echocardiogram today because of concern for coarctation of the aorta.    I performed and interpreted the fetal echocardiogram today, which demonstrated:  Mild hypoplasia of the aortic isthmus with continuous diastolic antegrade flow. Normal fetal intracardiac connections. Normal right and left ventricular size and systolic function. Fetal heart rate is regular at 156 bpm. No hydrops.    The abnormal results of the fetal echocardiogram were explained to the patient. A follow-up fetal echocardiogram is recommended at 30 weeks gestation. The need for Prostaglandin therapy will be determined based on post-kenton echocardiogram. Post-kenton echocardiogram should be performed within 24 hours of life.     I reviewed today's findings with Nory and her partner with the help of a diagram. I discussed the fetal cardiac anatomy, function, physiology, and plans for intervention following delivery. There is mild narrowing of the aortic isthmus with continuous antegrade diastolic flow. The remainder of the arch is normal, and all  left sided structures are normal without ventricular size discrepancy. Based on these findings, the patient will need further imaging of the arch after birth to determine need for intervention. I would like to see her back at 30 weeks of gestation to monitor for progression of arch obstruction. She will likely benefit from delivery at [facility] to facilitate post-kenton echo and evaluation, further plans determined at next visit.     Plan:    1. Follow up Fetal Echo in 7 weeks.  2. The anatomy does not appear to be PGE dependent  3. Plan for post- echocardiogram immediately after birth with further management based on echo findings  4. Further delivery plan based    Thank you for allowing me to participate in Nory's care. Please do not hesitate to contact me with questions or concerns.    This visit was separate from the performance and interpretation of the ultrasound. The majority of the time (>50%) was spent in counseling and coordination of care. I spent approximately 30 minutes in face-to-face time reviewing the above considerations.    Kurt Plascencia M.D.  Pediatric Cardiology  [address] Academic Office [address] [facility], [place] [address]  Phone [phone]  Fax [phone]          Please do not hesitate to contact me if you have any questions/concerns.     Sincerely,       Danie Plascencia MD

## 2022-11-17 NOTE — LETTER
Date:November 18, 2022      Patient was self referred, no letter generated. Do not send.        [facility] Health Information

## 2022-11-17 NOTE — PROGRESS NOTES
[facility]   Heart Center Fetal Consult Note    Patient:  Nory Woods MRN:  [id number]   YOB: 1990 Age:  32 year old   Date of Visit:  2022 PCP:  [facility]     Dear Doctor:    I had the pleasure of seeing Nory Woods at the [facility] on 2022 in fetal cardiology consultation for fetal echocardiogram results. She presented today accompanied by her . As you know, she is a 32 year old  at 23w0d who presented for fetal echocardiogram today because of concern for coarctation of the aorta.    I performed and interpreted the fetal echocardiogram today, which demonstrated:  Mild hypoplasia of the aortic isthmus with continuous diastolic antegrade flow. Normal fetal intracardiac connections. Normal right and left ventricular size and systolic function. Fetal heart rate is regular at 156 bpm. No hydrops.    The abnormal results of the fetal echocardiogram were explained to the patient. A follow-up fetal echocardiogram is recommended at 30 weeks gestation. The need for Prostaglandin therapy will be determined based on post-kenton echocardiogram. Post-kenton echocardiogram should be performed within 24 hours of life.     I reviewed today's findings with Nory and her partner with the help of a diagram. I discussed the fetal cardiac anatomy, function, physiology, and plans for intervention following delivery. There is mild narrowing of the aortic isthmus with continuous antegrade diastolic flow. The remainder of the arch is normal, and all left sided structures are normal without ventricular size discrepancy. Based on these findings, the patient will need further imaging of the arch after birth to determine need for intervention. I would like to see her back at 30 weeks of gestation to monitor for progression of arch obstruction. She will likely benefit from delivery at [facility]  facilitate post- echo and evaluation, further plans determined at next visit.     Plan:    1. Follow up Fetal Echo in 7 weeks.  2. The anatomy does not appear to be PGE dependent  3. Plan for post-kenton echocardiogram immediately after birth with further management based on echo findings  4. Further delivery plan based    Thank you for allowing me to participate in Nory's care. Please do not hesitate to contact me with questions or concerns.    This visit was separate from the performance and interpretation of the ultrasound. The majority of the time (>50%) was spent in counseling and coordination of care. I spent approximately 30 minutes in face-to-face time reviewing the above considerations.    Kurt Plascencia M.D.  Pediatric Cardiology  [facility] [address] Office [address] [facility], [place] [address]  Phone [phone]  Fax [phone]

## 2022-11-21 ENCOUNTER — PRENATAL OFFICE VISIT (OUTPATIENT)
Dept: OBGYN | Facility: CLINIC | Age: 32
End: 2022-11-21
Payer: COMMERCIAL

## 2022-11-21 VITALS
BODY MASS INDEX: 24.39 KG/M2 | SYSTOLIC BLOOD PRESSURE: 100 MMHG | DIASTOLIC BLOOD PRESSURE: 66 MMHG | WEIGHT: 155.4 LBS | HEIGHT: 67 IN

## 2022-11-21 DIAGNOSIS — O35.BXX0 ANOMALY OF HEART OF FETUS AFFECTING PREGNANCY, ANTEPARTUM, SINGLE OR UNSPECIFIED FETUS: ICD-10-CM

## 2022-11-21 DIAGNOSIS — E03.9 HYPOTHYROID IN PREGNANCY, ANTEPARTUM, SECOND TRIMESTER: ICD-10-CM

## 2022-11-21 DIAGNOSIS — O09.92 SUPERVISION OF HIGH RISK PREGNANCY IN SECOND TRIMESTER: Primary | ICD-10-CM

## 2022-11-21 DIAGNOSIS — O28.3 ABNORMAL FETAL ULTRASOUND: ICD-10-CM

## 2022-11-21 DIAGNOSIS — O99.282 HYPOTHYROID IN PREGNANCY, ANTEPARTUM, SECOND TRIMESTER: ICD-10-CM

## 2022-11-21 PROCEDURE — 99207 PR PRENATAL VISIT: CPT | Performed by: OBSTETRICS & GYNECOLOGY

## 2022-11-21 PROCEDURE — 36415 COLL VENOUS BLD VENIPUNCTURE: CPT | Performed by: OBSTETRICS & GYNECOLOGY

## 2022-11-21 PROCEDURE — 84443 ASSAY THYROID STIM HORMONE: CPT | Performed by: OBSTETRICS & GYNECOLOGY

## 2022-11-22 LAB — TSH SERPL DL<=0.005 MIU/L-ACNC: 1.12 UIU/ML (ref 0.3–4.2)

## 2022-11-22 NOTE — PROGRESS NOTES
"Patient was found to have an echogenic focus in the fetal stomach and a large placental lake near the cord so was referred to [facility] for that  When there they actually found aortic root narrowing, the fetal stomach echogenic focus was also there of unknown significance and the placenta appeared normal  She has since had a fetal echo that shows a very mild hypoplasia of the aortic root and were told that repeat echo at 30 weeks and after delivery should be done.  They want her to deliver at [facility] so she can be immediately at [facility] for peds cards access.   They have been told that this may progress/regress/or persist very mildly so we have decided to continue her care here with me until that echo repeat and then if it is still appropriate to deliver at [facility] will tx her care at that time and patient prefers that as well  Both her and Dejan were very nervous and anxious and \"went down the google rabbit hole\" initially but are now actually feeling fine about things and are approaching it calmly and will see what happens in coming weeks  Tons of FM all the time now which is reassuring  No ctx or BH but sometimes will get more of a muscle pull, \"stitch in her side\" when on the peloton mostly or exercising. Reassured this is muscular and not dangerous to the baby and can adjust exercise as needed for comfort  No swelling, No HAs, no other concerns at this time  Has a repeat LII in another few weeks as well to relook at the fetal stomach but [facility] didn't feel this was likely an issue.  Did offer her amnio to make sure not syndromic relationship but they declined this and cards doesn't feel this looks syndrome/aneuploidy related  Adjusted levox down to 25mcg from 50mcg at 10 weeks and haven't repeat TSH yet. Will do that today and adjust or stop meds if indicated  Return 4 weeks with GCT, cbc, tdap  Addressed her biboost and a flu shot. Has not done a flu shot ever but is considering it now. Has done her covid series but worries " about the vaxx in preg  Reviewed the studies and safety data, position statements from acog, [facility], etc  Discussed severe influenza cases along with RSV with covid actually being least prevalent and strongly encouraged her to do them both  Is not ruling it out but want to think about it a bit more.

## 2022-11-22 NOTE — RESULT ENCOUNTER NOTE
Nory,    Your TSH is great. Right in the 1-2.5 range that we aim for.    So let's stick with the 25mcg that you're on and we can recheck again in another few months.    Radha Iraheta MD

## 2022-11-29 ENCOUNTER — TELEPHONE (OUTPATIENT)
Dept: OBGYN | Facility: CLINIC | Age: 32
End: 2022-11-29

## 2022-11-29 NOTE — TELEPHONE ENCOUNTER
Pt calling back after purchasing a home blood pressure cuff - not at the advice of nurse but readings:    129/86  120/80  122/84    Pt feeling same as when talked to her before w her slight vertigo. She states her previous BP in clinic were 100/66.  Reassured pt these readings are still good and explained readings we would be concerned about and also her BP cuff is new and can bring to clinic at next visit to calibrate w clinic cuff for accuracy.    Reassured pt that message was sent to Dr Iraheta for review and BP readings will be added but if no concern - no news is good news.    Pt verbalized understanding, in agreement with plan, and voiced no further questions.  Susan Morocho RN on 11/29/2022 at 10:34 AM

## 2022-11-29 NOTE — TELEPHONE ENCOUNTER
"24w5d    Pt reporting lightheadedness since last evening. Worse last night. Better this morning after a good night sleep.    Vertigo like - no visual blurriness but \"taking some time to focus on the edge of her vision\". More when she stands up or move positions too quick.  Was super thirsty yesterday and really drinking a lot yesterday. 15oz at a time for example.  Yesterday a more mild computer day as she works from home but relaxed a lot and admits on her phone most of the evening.  Denies HA, upper abdominal pains, swelling.    Discussed at length normal pregnancy changes including circulation and growth of baby/position of baby in abdomen - reassured she is feeling better today. Encouraged to stay up on hydration and also eating through out the day - protein/fiber to maintain a level blood sugar. Encouraged frequent and careful position changes while working from home and     Reviewed sx's to watch out for and call for including pre-e sx - discussed rare so early but this is the time we start watching out for sx.    Pt felt monitoring at home was agreeable and verbalized understanding when to call.    Routing to Dr Iraheta as FYI - giving pt reassurance you will be aware and if had anything further to add, will call pt. O/w no need to call back.    Susan Morocho RN on 11/29/2022 at 9:09 AM    "

## 2022-11-29 NOTE — TELEPHONE ENCOUNTER
Pt is 24+5 wks - reports being light headed since 5 am this morning.     Please callback : [phone]

## 2022-11-30 NOTE — TELEPHONE ENCOUNTER
Returned call to patient. Reassured that BPs provided yesterday were OK per Dr Iraheta. Pt states that she was also feeling better last evening.  Pt to bring her cuff to her next visit so that it can be calibrated.  Pt verbalized understanding, in agreement with plan, and voiced no further questions.  Jyothi Solis RN on 11/30/2022 at 8:24 AM

## 2022-11-30 NOTE — TELEPHONE ENCOUNTER
These are completely normal and a digital cuff almost always reads a bit higher than here. Can bring with to next appointment to calibrate

## 2022-12-02 ENCOUNTER — TELEPHONE (OUTPATIENT)
Dept: OBGYN | Facility: CLINIC | Age: 32
End: 2022-12-02

## 2022-12-02 NOTE — TELEPHONE ENCOUNTER
25w1d  Continues to have ongoing dizziness. Has had for 4 days continuously. Worse with movement or rolling in bed. Does feel better when she lays on her leftside. denies syncope.  Currently denies headache-however at times will have a mild headache sensation intermittently. Not severe.   Has had 1 episode last night that lasted 8minutes where she had increased dizziness,felt a hot sensation while she was sitting up in a chair,felt nauseated(no vomiting) and urge to use the bathroom, started seeing spots-lasted 3-4minutes. And this all subsided once she layed on her left side. Baby has been pretty active the last 16hours, especially from 5-10:30pm last night and today around 3am.   No migraines. No ongoing nausea.  Does have a history of anemia while not pregnant.    Is drinking plenty of fluids.  BP today 107/80.     MFM appt 12/13/22    appt with Esequiel on 12/23/22    Should she be evaluated in clinic today? Labs?    Viky Martinez RN

## 2022-12-05 NOTE — TELEPHONE ENCOUNTER
To me this sounds like typical compression hypotension from the uterus. Sitting for long, laying on back, standing, etc all compress venous return and are making her dizzy. This can happen with standing up quick but even just prolonged sitting.  Just needs to hydrate a lot, not sit for too long wtihout walking and moving, compression socks, etc   This is very normal and typical in 3rd tri

## 2022-12-13 ENCOUNTER — OFFICE VISIT (OUTPATIENT)
Dept: MATERNAL FETAL MEDICINE | Facility: CLINIC | Age: 32
End: 2022-12-13
Attending: OBSTETRICS & GYNECOLOGY
Payer: COMMERCIAL

## 2022-12-13 ENCOUNTER — HOSPITAL ENCOUNTER (OUTPATIENT)
Dept: ULTRASOUND IMAGING | Facility: CLINIC | Age: 32
Discharge: HOME | End: 2022-12-13
Attending: OBSTETRICS & GYNECOLOGY
Payer: COMMERCIAL

## 2022-12-13 DIAGNOSIS — O35.9XX0 SUSPECTED FETAL ABNORMALITY AFFECTING MANAGEMENT OF MOTHER, SINGLE OR UNSPECIFIED FETUS: Primary | ICD-10-CM

## 2022-12-13 DIAGNOSIS — Z36.9 ENCOUNTER FOR ANTENATAL SCREENING OF MOTHER: Primary | ICD-10-CM

## 2022-12-13 DIAGNOSIS — Z36.2 ENCOUNTER FOR FOLLOW-UP ULTRASOUND OF FETAL ANATOMY: ICD-10-CM

## 2022-12-13 DIAGNOSIS — Z23 NEED FOR TDAP VACCINATION: ICD-10-CM

## 2022-12-13 PROCEDURE — 76816 OB US FOLLOW-UP PER FETUS: CPT

## 2022-12-13 PROCEDURE — 76816 OB US FOLLOW-UP PER FETUS: CPT | Mod: 26 | Performed by: OBSTETRICS & GYNECOLOGY

## 2022-12-21 ENCOUNTER — APPOINTMENT (OUTPATIENT)
Dept: LAB | Facility: CLINIC | Age: 32
End: 2022-12-21
Payer: COMMERCIAL

## 2022-12-21 ENCOUNTER — PRENATAL OFFICE VISIT (OUTPATIENT)
Dept: OBGYN | Facility: CLINIC | Age: 32
End: 2022-12-21
Payer: COMMERCIAL

## 2022-12-21 VITALS
BODY MASS INDEX: 24.86 KG/M2 | SYSTOLIC BLOOD PRESSURE: 100 MMHG | HEIGHT: 67 IN | WEIGHT: 158.4 LBS | DIASTOLIC BLOOD PRESSURE: 70 MMHG

## 2022-12-21 DIAGNOSIS — O35.BXX0 ANOMALY OF HEART OF FETUS AFFECTING PREGNANCY, ANTEPARTUM, SINGLE OR UNSPECIFIED FETUS: ICD-10-CM

## 2022-12-21 DIAGNOSIS — O09.92 SUPERVISION OF HIGH RISK PREGNANCY IN SECOND TRIMESTER: Primary | ICD-10-CM

## 2022-12-21 DIAGNOSIS — O28.3 ABNORMAL FETAL ULTRASOUND: ICD-10-CM

## 2022-12-21 DIAGNOSIS — Z36.9 ENCOUNTER FOR ANTENATAL SCREENING OF MOTHER: ICD-10-CM

## 2022-12-21 DIAGNOSIS — O99.283 HYPOTHYROID IN PREGNANCY, ANTEPARTUM, THIRD TRIMESTER: ICD-10-CM

## 2022-12-21 DIAGNOSIS — E03.9 HYPOTHYROID IN PREGNANCY, ANTEPARTUM, THIRD TRIMESTER: ICD-10-CM

## 2022-12-21 DIAGNOSIS — O26.813 FATIGUE DURING PREGNANCY IN THIRD TRIMESTER: ICD-10-CM

## 2022-12-21 DIAGNOSIS — Z34.03 ENCOUNTER FOR SUPERVISION OF NORMAL FIRST PREGNANCY IN THIRD TRIMESTER: ICD-10-CM

## 2022-12-21 LAB
ERYTHROCYTE [DISTWIDTH] IN BLOOD BY AUTOMATED COUNT: 12.2 % (ref 10–15)
FERRITIN SERPL-MCNC: 23 NG/ML (ref 6–175)
GLUCOSE 1H P 50 G GLC PO SERPL-MCNC: 101 MG/DL (ref 70–129)
HCT VFR BLD AUTO: 38.8 % (ref 35–47)
HGB BLD-MCNC: 12.6 G/DL (ref 11.7–15.7)
IRON BINDING CAPACITY (ROCHE): 336 UG/DL (ref 240–430)
IRON SATN MFR SERPL: 30 % (ref 15–46)
IRON SERPL-MCNC: 101 UG/DL (ref 37–145)
MCH RBC QN AUTO: 29.6 PG (ref 26.5–33)
MCHC RBC AUTO-ENTMCNC: 32.5 G/DL (ref 31.5–36.5)
MCV RBC AUTO: 91 FL (ref 78–100)
PLATELET # BLD AUTO: 197 10E3/UL (ref 150–450)
RBC # BLD AUTO: 4.26 10E6/UL (ref 3.8–5.2)
TSH SERPL DL<=0.005 MIU/L-ACNC: 1.47 UIU/ML (ref 0.3–4.2)
WBC # BLD AUTO: 9 10E3/UL (ref 4–11)

## 2022-12-21 PROCEDURE — 83550 IRON BINDING TEST: CPT | Performed by: OBSTETRICS & GYNECOLOGY

## 2022-12-21 PROCEDURE — 84443 ASSAY THYROID STIM HORMONE: CPT | Performed by: OBSTETRICS & GYNECOLOGY

## 2022-12-21 PROCEDURE — 90471 IMMUNIZATION ADMIN: CPT | Performed by: OBSTETRICS & GYNECOLOGY

## 2022-12-21 PROCEDURE — 82950 GLUCOSE TEST: CPT | Performed by: OBSTETRICS & GYNECOLOGY

## 2022-12-21 PROCEDURE — 36415 COLL VENOUS BLD VENIPUNCTURE: CPT | Performed by: OBSTETRICS & GYNECOLOGY

## 2022-12-21 PROCEDURE — 90715 TDAP VACCINE 7 YRS/> IM: CPT | Performed by: OBSTETRICS & GYNECOLOGY

## 2022-12-21 PROCEDURE — 82728 ASSAY OF FERRITIN: CPT | Performed by: OBSTETRICS & GYNECOLOGY

## 2022-12-21 PROCEDURE — 83540 ASSAY OF IRON: CPT | Performed by: OBSTETRICS & GYNECOLOGY

## 2022-12-21 PROCEDURE — 99207 PR PRENATAL VISIT: CPT | Performed by: OBSTETRICS & GYNECOLOGY

## 2022-12-21 PROCEDURE — 85027 COMPLETE CBC AUTOMATED: CPT | Performed by: OBSTETRICS & GYNECOLOGY

## 2022-12-21 NOTE — PROGRESS NOTES
Patient is being seen today for her 28 week OB visit. GCT today. TDAP completed today.   There was a small echogenic area in the stomach noted on MFM scan but stable from the last. Unclear etiology but MFM didn't sound too concerned.  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 Last scan with MFM they couldn't even see any issue themselves.  Has an appointment with me the day after the echo on 1/6. If there is something that would require tertiary care center delivery then would tx her OB care after 32 weeks or so to [place], but hoping that it won't be needed  After that has another growth scan with MFM about 2.5 weeks after the echo so is going to make one more appointment with me after that and then determine location of delivery and OB care   More FM. Notices more FM at night though. Nausea is coming back and no appetite and can't eat much at once. Trying to eat more often and smaller portions. Sleeping not great and more discomfort. Bad night of sleep and more nausea and dry heaving the next day. Denies heart burn  Recommend benadryl/unisom with one tylenol for sleep, can do antinausea meds if needed and can do prilosec for reflux or heart burn as that can cause nausea/dryheaves as well  Getting dizzier easily and mychart messaged about it. has found ways to deal with it to help but worried it's her iron. will check that today along with her CBC but also her TSH  Discussed restless leg, SOB, fatigue and iron relationship. Her dizziness is a circulation, compression of pregnancy issue with a baseline low BP.   Discussed where to call/go in for eval if labor or SROM at term or at least after 30 weeks vs before 30 weeks or if fetal indications require [place].  Return 2 wks

## 2022-12-23 NOTE — RESULT ENCOUNTER NOTE
Nory,    Your thyroid looks great with a TSH in the optimal range. You were given a 3 month supply on the levoxyl 10/3 and then one more refill. So you should have close to 4 more months worth of medication and guessing that'll last you to just around having the baby and then we can assess what dose, if any, you need.    Your iron, ferritin (the storage/savings account of iron) and you hemoglobin all look great. So you're not iron deficient or anemic. I think the light headedness you and I discussed is much more about circulation and equilibrating slowly when you're pregnant and not as much anemia. So this is all looking great!    Radha Iraheta MD

## 2023-01-05 ENCOUNTER — OFFICE VISIT (OUTPATIENT)
Dept: PEDIATRIC CARDIOLOGY | Facility: CLINIC | Age: 33
End: 2023-01-05
Payer: COMMERCIAL

## 2023-01-05 ENCOUNTER — HOSPITAL ENCOUNTER (OUTPATIENT)
Dept: CARDIOLOGY | Facility: CLINIC | Age: 33
Discharge: HOME | End: 2023-01-05
Attending: OBSTETRICS & GYNECOLOGY | Admitting: OBSTETRICS & GYNECOLOGY
Payer: COMMERCIAL

## 2023-01-05 DIAGNOSIS — O35.BXX0 ANOMALY OF HEART OF FETUS AFFECTING PREGNANCY, ANTEPARTUM, SINGLE OR UNSPECIFIED FETUS: Primary | ICD-10-CM

## 2023-01-05 DIAGNOSIS — O35.9XX0 SUSPECTED FETAL ABNORMALITY AFFECTING MANAGEMENT OF MOTHER, SINGLE OR UNSPECIFIED FETUS: ICD-10-CM

## 2023-01-05 PROCEDURE — 76828 ECHO EXAM OF FETAL HEART: CPT | Mod: 26 | Performed by: PEDIATRICS

## 2023-01-05 PROCEDURE — 99213 OFFICE O/P EST LOW 20 MIN: CPT | Mod: 25 | Performed by: PEDIATRICS

## 2023-01-05 PROCEDURE — 76826 ECHO EXAM OF FETAL HEART: CPT | Mod: 26 | Performed by: PEDIATRICS

## 2023-01-05 PROCEDURE — 93325 DOPPLER ECHO COLOR FLOW MAPG: CPT | Mod: 26 | Performed by: PEDIATRICS

## 2023-01-05 PROCEDURE — 93325 DOPPLER ECHO COLOR FLOW MAPG: CPT

## 2023-01-05 NOTE — RESULT ENCOUNTER NOTE
Nory,    Looks like great news after the echo! Hopefully now this means that there's no reason why you can't continue your prenatal care with me and delivery with us at [facility]!    Radha Iraheta MD

## 2023-01-05 NOTE — LETTER
Date:January 6, 2023      Patient was self referred, no letter generated. Do not send.        [facility] Health Information

## 2023-01-05 NOTE — LETTER
2023      RE: Nory Woods  [address] [facility] [address]     Dear Colleague,    Thank you for the opportunity to participate in the care of your patient, Nory Woods, at the [facility] at [facility]. Please see a copy of my visit note below.    [facility]   Heart Center Fetal Consult Note    Patient:  Nory Woods MRN:  [id number]   YOB: 1990 Age:  32 year old   Date of Visit:  2023 [facility]     Dear Doctor:    I had the pleasure of seeing Nory Woods at the [facility] on 2023 in fetal cardiology consultation for fetal echocardiogram results. She presented today accompanied by her . As you know, she is a 32 year old  at 30w0d who presented for follow-up fetal echocardiogram today because of mild hypoplasia of the aortic arch.    I performed and interpreted the fetal echocardiogram today, which demonstrated:  Normal fetal cardiac anatomy. Normal fetal intracardiac connections. Normal right and left ventricular size and function. The aortic arch and isthmus are normal. Fetal heart rate is regular at 147 bpm. No hydrops.    I reviewed today's findings with Nory and her partner with the help of a diagram. I discussed the fetal cardiac anatomy, function, physiology, and plans for intervention following delivery. The aortic arch and isthmus measure normal on today's study. There is normal cardiac anatomy and normal left heart structures. Based on these findings Nory does not need further follow-up with cardiology and does not need to change her delivery plans.     Plan:    1. No change in delivery plan based on today's findings.  2. Post-[place]enton echocardiogram is not needed unless clinical concern  3. No further follow-up in fetal cardiology needed    Thank you for  allowing me to participate in Nory's care. Please do not hesitate to contact me with questions or concerns.    This visit was separate from the performance and interpretation of the ultrasound. The majority of the time (>50%) was spent in counseling and coordination of care. I spent approximately 20 minutes in face-to-face time reviewing the above considerations.    Kurt Plascencia M.D.  Pediatric Cardiology  [address] Academic Office Building 4th floor, [place] [address]  Phone [phone]  Fax [phone]          Please do not hesitate to contact me if you have any questions/concerns.     Sincerely,       Danie Plascencia MD

## 2023-01-05 NOTE — PROGRESS NOTES
[facility]   Heart Center Fetal Consult Note    Patient:  Nory Woods MRN:  [id number]   YOB: 1990 Age:  32 year old   Date of Visit:  2023 PCP:  [facility]     Dear Doctor:    I had the pleasure of seeing Nory Woods at the [facility] on 2023 in fetal cardiology consultation for fetal echocardiogram results. She presented today accompanied by her . As you know, she is a 32 year old  at 30w0d who presented for follow-up fetal echocardiogram today because of mild hypoplasia of the aortic arch.    I performed and interpreted the fetal echocardiogram today, which demonstrated:  Normal fetal cardiac anatomy. Normal fetal intracardiac connections. Normal right and left ventricular size and function. The aortic arch and isthmus are normal. Fetal heart rate is regular at 147 bpm. No hydrops.    I reviewed today's findings with Nory and her partner with the help of a diagram. I discussed the fetal cardiac anatomy, function, physiology, and plans for intervention following delivery. The aortic arch and isthmus measure normal on today's study. There is normal cardiac anatomy and normal left heart structures. Based on these findings Nory does not need further follow-up with cardiology and does not need to change her delivery plans.     Plan:    1. No change in delivery plan based on today's findings.  2. Post-kenton echocardiogram is not needed unless clinical concern  3. No further follow-up in fetal cardiology needed    Thank you for allowing me to participate in Nory's care. Please do not hesitate to contact me with questions or concerns.    This visit was separate from the performance and interpretation of the ultrasound. The majority of the time (>50%) was spent in counseling and coordination of care. I spent approximately 20 minutes in face-to-face time reviewing the above  considerations.    Kurt Plascencia M.D.  Pediatric Cardiology  [facility] [address] [facility] Office Building [address]th [facility], [place] [address]  Phone [phone]  Fax [phone]

## 2023-01-06 ENCOUNTER — PRENATAL OFFICE VISIT (OUTPATIENT)
Dept: OBGYN | Facility: CLINIC | Age: 33
End: 2023-01-06
Payer: COMMERCIAL

## 2023-01-06 VITALS
HEIGHT: 67 IN | BODY MASS INDEX: 25.36 KG/M2 | SYSTOLIC BLOOD PRESSURE: 100 MMHG | DIASTOLIC BLOOD PRESSURE: 62 MMHG | WEIGHT: 161.6 LBS

## 2023-01-06 DIAGNOSIS — O09.92 SUPERVISION OF HIGH RISK PREGNANCY IN SECOND TRIMESTER: Primary | ICD-10-CM

## 2023-01-06 DIAGNOSIS — E03.9 HYPOTHYROID IN PREGNANCY, ANTEPARTUM, THIRD TRIMESTER: ICD-10-CM

## 2023-01-06 DIAGNOSIS — O99.283 HYPOTHYROID IN PREGNANCY, ANTEPARTUM, THIRD TRIMESTER: ICD-10-CM

## 2023-01-06 DIAGNOSIS — O28.3 ABNORMAL FETAL ULTRASOUND: ICD-10-CM

## 2023-01-06 DIAGNOSIS — Z23 NEED FOR PROPHYLACTIC VACCINATION AND INOCULATION AGAINST INFLUENZA: ICD-10-CM

## 2023-01-06 PROCEDURE — 99207 PR PRENATAL VISIT: CPT | Performed by: OBSTETRICS & GYNECOLOGY

## 2023-01-06 PROCEDURE — 90686 IIV4 VACC NO PRSV 0.5 ML IM: CPT | Performed by: OBSTETRICS & GYNECOLOGY

## 2023-01-06 PROCEDURE — 90471 IMMUNIZATION ADMIN: CPT | Performed by: OBSTETRICS & GYNECOLOGY

## 2023-01-06 NOTE — PROGRESS NOTES
Patient is being seen today for her 30 week OB visit.   Flu completed today but declines her covid booster  Just a couple of days ago she had her f/up peds echo to eval if there was true aortic stenosis or not and there wasn't  The echo was normal and fetal cardiac imaging was completely fine so now shouldn't have to Tx to [facility] and can continue prenatal care with me and deliver at [facility]  Has another MFM scan in a couple of weeks already. Still has the small unknown structure in the stomach and will allow them to close the loop with another growth scan, etc  However after that may consider one more growth/eval at 36 weeks but will await MFM recs from her next visit with them.     Getting more uncomfortable but expectedly so and nothing severe.   Feels that body temp has generally just risen so is always more warm and and is sweating more.   Tons of FM especially at night.   Will sleep fine every other night. No pain or significant discomfort but then ever other night just has bad insomnia after 2 am  Discussed unisom and benadryl or sleeping earlier to get net amount of sleep to be adequate.   Discussed tylenol PM once patient feels more aches and pains at night.   Discussed FKC and not routine recommended if feels normal amount and timing of fetal movement for her   Discussed diverts at [facility] and other hosp in town when comes time to delivery and how that is managed b/c she's heard about friends having to go somewhere other than home hosp   BP is great and appropriate weight gain  FH is just very slightly smaller than weeks and cephalic by leopolds  No edema or HAs  Return in 2 weeks.

## 2023-01-11 ENCOUNTER — HOSPITAL ENCOUNTER (OUTPATIENT)
Facility: CLINIC | Age: 33
Discharge: HOME | End: 2023-01-11
Attending: OBSTETRICS & GYNECOLOGY | Admitting: OBSTETRICS & GYNECOLOGY
Payer: COMMERCIAL

## 2023-01-11 VITALS — TEMPERATURE: 99.1 F | SYSTOLIC BLOOD PRESSURE: 119 MMHG | DIASTOLIC BLOOD PRESSURE: 68 MMHG

## 2023-01-11 LAB — RUPTURE OF FETAL MEMBRANES BY ROM PLUS: NEGATIVE

## 2023-01-11 PROCEDURE — G0463 HOSPITAL OUTPT CLINIC VISIT: HCPCS | Mod: 25

## 2023-01-11 PROCEDURE — 59025 FETAL NON-STRESS TEST: CPT

## 2023-01-11 PROCEDURE — 84112 EVAL AMNIOTIC FLUID PROTEIN: CPT | Performed by: OBSTETRICS & GYNECOLOGY

## 2023-01-11 RX ORDER — PROCHLORPERAZINE MALEATE 5 MG
10 TABLET ORAL EVERY 6 HOURS PRN
Status: DISCONTINUED | OUTPATIENT
Start: 2023-01-11 | End: 2023-01-11 | Stop reason: HOSPADM

## 2023-01-11 RX ORDER — PROCHLORPERAZINE 25 MG
25 SUPPOSITORY, RECTAL RECTAL EVERY 12 HOURS PRN
Status: DISCONTINUED | OUTPATIENT
Start: 2023-01-11 | End: 2023-01-11 | Stop reason: HOSPADM

## 2023-01-11 RX ORDER — METOCLOPRAMIDE HYDROCHLORIDE 5 MG/ML
10 INJECTION INTRAMUSCULAR; INTRAVENOUS EVERY 6 HOURS PRN
Status: DISCONTINUED | OUTPATIENT
Start: 2023-01-11 | End: 2023-01-11 | Stop reason: HOSPADM

## 2023-01-11 RX ORDER — METOCLOPRAMIDE 10 MG/1
10 TABLET ORAL EVERY 6 HOURS PRN
Status: DISCONTINUED | OUTPATIENT
Start: 2023-01-11 | End: 2023-01-11 | Stop reason: HOSPADM

## 2023-01-11 RX ORDER — ONDANSETRON 2 MG/ML
4 INJECTION INTRAMUSCULAR; INTRAVENOUS EVERY 6 HOURS PRN
Status: DISCONTINUED | OUTPATIENT
Start: 2023-01-11 | End: 2023-01-11 | Stop reason: HOSPADM

## 2023-01-11 RX ORDER — ONDANSETRON 4 MG/1
4 TABLET, ORALLY DISINTEGRATING ORAL EVERY 6 HOURS PRN
Status: DISCONTINUED | OUTPATIENT
Start: 2023-01-11 | End: 2023-01-11 | Stop reason: HOSPADM

## 2023-01-11 NOTE — DISCHARGE INSTRUCTIONS
Discharge Instruction for Undelivered Patients      You were seen for: Membrane Assessment  We Consulted: Dr. Sousa  You had (Test or Medicine):uterine and fetal monitoring, ROMplus     Diet:   Drink 8 to 12 glasses of liquids (milk, juice, water) every day.  You may eat meals and snacks.     Activity:  Call your doctor or nurse midwife if your baby is moving less than usual.     Call your provider if you notice:  Swelling in your face or increased swelling in your hands or legs.  Headaches that are not relieved by Tylenol (acetaminophen).  Changes in your vision (blurring: seeing spots or stars.)  Nausea (sick to your stomach) and vomiting (throwing up).   Weight gain of 5 pounds or more per week.  Heartburn that doesn't go away.  Signs of bladder infection: pain when you urinate (use the toilet), need to go more often and more urgently.  The bag of zuluaga (rupture of membranes) breaks, or you notice leaking in your underwear.  Bright red blood in your underwear.  Abdominal (lower belly) or stomach pain.  For first baby: Contractions (tightening) less than 5 minutes apart for one hour or more.  Increase or change in vaginal discharge (note the color and amount)      Follow-up:  As scheduled in the clinic

## 2023-01-11 NOTE — PLAN OF CARE
Data: Patient presented to the [facility] at 1509.   Reason for maternal/fetal assessment per patient is SROM assessment. Patient is a . Prenatal record reviewed.   Gestational Age 30+6wks. VSS. Cervix: not examined.  Fetal movement present. Patient denies cramping, backache, vaginal discharge, pelvic pressure, UTI symptoms, GI problems, bloody show, vaginal bleeding, edema, headache, visual disturbances, epigastric or URQ pain, abdominal pain. Support persons none present.  Pt reports feeling a small gush of fluid leaking at 0745 this morning.    Action: Verbal consent for EFM. Triage assessment completed. EFM applied for fetal well being with SROM. Uterine assessment no uterine activity. Fetal assessment: Presumed adequate fetal oxygenation documented (see flow record).  ROMplus collected and sent to lab, awaiting results.    Patient education forms given on triage discharge instructions Patient instructed to report change in fetal movement, vaginal leaking of fluid or bleeding, abdominal pain, or any concerns related to the pregnancy to her nurse/physician.     Dr. Sousa informed of negative ROMplus, fetal and uterine tracing. Plan per provider is discharge to home and follow up in clinic next as scheduled. Patient verbalized understanding of education and verbalized agreement with plan. Discharged ambulatory at 1635.

## 2023-01-19 ENCOUNTER — OFFICE VISIT (OUTPATIENT)
Dept: MATERNAL FETAL MEDICINE | Facility: CLINIC | Age: 33
End: 2023-01-19
Attending: OBSTETRICS & GYNECOLOGY
Payer: COMMERCIAL

## 2023-01-19 ENCOUNTER — HOSPITAL ENCOUNTER (OUTPATIENT)
Dept: ULTRASOUND IMAGING | Facility: CLINIC | Age: 33
Discharge: HOME | End: 2023-01-19
Attending: OBSTETRICS & GYNECOLOGY
Payer: COMMERCIAL

## 2023-01-19 ENCOUNTER — HOSPITAL ENCOUNTER (OUTPATIENT)
Facility: CLINIC | Age: 33
End: 2023-01-19
Admitting: OBSTETRICS & GYNECOLOGY
Payer: COMMERCIAL

## 2023-01-19 ENCOUNTER — HOSPITAL ENCOUNTER (OUTPATIENT)
Facility: CLINIC | Age: 33
Discharge: HOME | End: 2023-01-19
Attending: OBSTETRICS & GYNECOLOGY | Admitting: OBSTETRICS & GYNECOLOGY
Payer: COMMERCIAL

## 2023-01-19 VITALS — DIASTOLIC BLOOD PRESSURE: 79 MMHG | HEART RATE: 92 BPM | SYSTOLIC BLOOD PRESSURE: 145 MMHG

## 2023-01-19 VITALS — SYSTOLIC BLOOD PRESSURE: 114 MMHG | DIASTOLIC BLOOD PRESSURE: 63 MMHG | TEMPERATURE: 98.1 F

## 2023-01-19 DIAGNOSIS — O35.9XX0 SUSPECTED FETAL ABNORMALITY AFFECTING MANAGEMENT OF MOTHER, SINGLE OR UNSPECIFIED FETUS: ICD-10-CM

## 2023-01-19 DIAGNOSIS — Z36.89 ENCOUNTER FOR ULTRASOUND TO ASSESS FETAL GROWTH: Primary | ICD-10-CM

## 2023-01-19 DIAGNOSIS — R03.0 ELEVATED BLOOD PRESSURE READING WITHOUT DIAGNOSIS OF HYPERTENSION: ICD-10-CM

## 2023-01-19 LAB
ALBUMIN MFR UR ELPH: 8.4 MG/DL (ref 1–14)
ALBUMIN SERPL BCG-MCNC: 3.6 G/DL (ref 3.5–5.2)
ALP SERPL-CCNC: 103 U/L (ref 35–104)
ALT SERPL W P-5'-P-CCNC: 36 U/L (ref 10–35)
ANION GAP SERPL CALCULATED.3IONS-SCNC: 9 MMOL/L (ref 7–15)
AST SERPL W P-5'-P-CCNC: 30 U/L (ref 10–35)
BILIRUB SERPL-MCNC: 0.2 MG/DL
BUN SERPL-MCNC: 6 MG/DL (ref 6–20)
CALCIUM SERPL-MCNC: 9 MG/DL (ref 8.6–10)
CHLORIDE SERPL-SCNC: 104 MMOL/L (ref 98–107)
CREAT SERPL-MCNC: 0.42 MG/DL (ref 0.51–0.95)
CREAT UR-MCNC: 95.4 MG/DL
DEPRECATED HCO3 PLAS-SCNC: 22 MMOL/L (ref 22–29)
ERYTHROCYTE [DISTWIDTH] IN BLOOD BY AUTOMATED COUNT: 11.9 % (ref 10–15)
GFR SERPL CREATININE-BSD FRML MDRD: >90 ML/MIN/1.73M2
GLUCOSE SERPL-MCNC: 91 MG/DL (ref 70–99)
HCT VFR BLD AUTO: 39.6 % (ref 35–47)
HGB BLD-MCNC: 13.1 G/DL (ref 11.7–15.7)
MCH RBC QN AUTO: 29.8 PG (ref 26.5–33)
MCHC RBC AUTO-ENTMCNC: 33.1 G/DL (ref 31.5–36.5)
MCV RBC AUTO: 90 FL (ref 78–100)
PLATELET # BLD AUTO: 209 10E3/UL (ref 150–450)
POTASSIUM SERPL-SCNC: 4 MMOL/L (ref 3.4–5.3)
PROT SERPL-MCNC: 6.1 G/DL (ref 6.4–8.3)
PROT/CREAT 24H UR: 0.09 MG/MG CR (ref 0–0.2)
RBC # BLD AUTO: 4.4 10E6/UL (ref 3.8–5.2)
SODIUM SERPL-SCNC: 135 MMOL/L (ref 136–145)
WBC # BLD AUTO: 9.5 10E3/UL (ref 4–11)

## 2023-01-19 PROCEDURE — 76816 OB US FOLLOW-UP PER FETUS: CPT

## 2023-01-19 PROCEDURE — 59025 FETAL NON-STRESS TEST: CPT

## 2023-01-19 PROCEDURE — 84156 ASSAY OF PROTEIN URINE: CPT | Performed by: OBSTETRICS & GYNECOLOGY

## 2023-01-19 PROCEDURE — 85027 COMPLETE CBC AUTOMATED: CPT | Performed by: OBSTETRICS & GYNECOLOGY

## 2023-01-19 PROCEDURE — 76816 OB US FOLLOW-UP PER FETUS: CPT | Mod: 26 | Performed by: STUDENT IN AN ORGANIZED HEALTH CARE EDUCATION/TRAINING PROGRAM

## 2023-01-19 PROCEDURE — 99213 OFFICE O/P EST LOW 20 MIN: CPT | Mod: 25 | Performed by: STUDENT IN AN ORGANIZED HEALTH CARE EDUCATION/TRAINING PROGRAM

## 2023-01-19 PROCEDURE — G0463 HOSPITAL OUTPT CLINIC VISIT: HCPCS | Mod: 25

## 2023-01-19 PROCEDURE — 36415 COLL VENOUS BLD VENIPUNCTURE: CPT | Performed by: OBSTETRICS & GYNECOLOGY

## 2023-01-19 PROCEDURE — 80053 COMPREHEN METABOLIC PANEL: CPT | Performed by: OBSTETRICS & GYNECOLOGY

## 2023-01-19 RX ORDER — ONDANSETRON 2 MG/ML
4 INJECTION INTRAMUSCULAR; INTRAVENOUS EVERY 6 HOURS PRN
Status: DISCONTINUED | OUTPATIENT
Start: 2023-01-19 | End: 2023-01-19 | Stop reason: HOSPADM

## 2023-01-19 RX ORDER — ONDANSETRON 4 MG/1
4 TABLET, ORALLY DISINTEGRATING ORAL EVERY 6 HOURS PRN
Status: DISCONTINUED | OUTPATIENT
Start: 2023-01-19 | End: 2023-01-19 | Stop reason: HOSPADM

## 2023-01-19 RX ORDER — METOCLOPRAMIDE HYDROCHLORIDE 5 MG/ML
10 INJECTION INTRAMUSCULAR; INTRAVENOUS EVERY 6 HOURS PRN
Status: DISCONTINUED | OUTPATIENT
Start: 2023-01-19 | End: 2023-01-19 | Stop reason: HOSPADM

## 2023-01-19 RX ORDER — PROCHLORPERAZINE MALEATE 5 MG
10 TABLET ORAL EVERY 6 HOURS PRN
Status: DISCONTINUED | OUTPATIENT
Start: 2023-01-19 | End: 2023-01-19 | Stop reason: HOSPADM

## 2023-01-19 RX ORDER — PROCHLORPERAZINE 25 MG
25 SUPPOSITORY, RECTAL RECTAL EVERY 12 HOURS PRN
Status: DISCONTINUED | OUTPATIENT
Start: 2023-01-19 | End: 2023-01-19 | Stop reason: HOSPADM

## 2023-01-19 RX ORDER — METOCLOPRAMIDE 10 MG/1
10 TABLET ORAL EVERY 6 HOURS PRN
Status: DISCONTINUED | OUTPATIENT
Start: 2023-01-19 | End: 2023-01-19 | Stop reason: HOSPADM

## 2023-01-19 ASSESSMENT — ACTIVITIES OF DAILY LIVING (ADL): ADLS_ACUITY_SCORE: 35

## 2023-01-19 NOTE — PROGRESS NOTES
Please see the full imaging report from the ViewPoint program under the imaging tab.    Andreina Palma MD  Maternal Fetal Medicine

## 2023-01-19 NOTE — PLAN OF CARE
Data: Patient presented to [facility]: 2023  9:51 AM.  Reason for maternal/fetal assessment is elevated blood pressures. Patient reports high blood pressure at M appointment and didn't feel well.  Patient is a .  Prenatal record reviewed. Pregnancy has been uncomplicated.  Gestational Age 32w0d. VSS. Fetal movement present. Patient denies uterine contractions, leaking of vaginal fluid/rupture of membranes, vaginal bleeding, abdominal pain, pelvic pressure, nausea, vomiting, headache, visual disturbances, epigastric or URQ pain, significant edema. Support person is present.   Action: Verbal consent for EFM. Triage assessment completed. Bill of rights reviewed.  Response: Patient verbalized agreement with plan. Dr Tita Orozco had given orders.      Updated and reviewed labs, FHTs, BP (all WNL).  Pt discharged and will return to clinic tomorrow for regularly scheduled visit.  Discharge instructions reviewed.  Pt discharged at noon.

## 2023-01-19 NOTE — DISCHARGE INSTRUCTIONS
Discharge Instruction for Undelivered Patients      You were seen for:  elevated blood pressure  We Consulted: Dr. Orozco  You had (Test or Medicine):NST, labs, BPs     Diet:   Drink 8 to 12 glasses of liquids (milk, juice, water) every day.  You may eat meals and snacks.     Activity:  Call your doctor or nurse midwife if your baby is moving less than usual.     Call your provider if you notice:  Swelling in your face or increased swelling in your hands or legs.  Headaches that are not relieved by Tylenol (acetaminophen).  Changes in your vision (blurring: seeing spots or stars.)  Nausea (sick to your stomach) and vomiting (throwing up).   Weight gain of 5 pounds or more per week.  Heartburn that doesn't go away.  Signs of bladder infection: pain when you urinate (use the toilet), need to go more often and more urgently.  The bag of zuluaga (rupture of membranes) breaks, or you notice leaking in your underwear.  Bright red blood in your underwear.  Abdominal (lower belly) or stomach pain.  For first baby: Contractions (tightening) less than 5 minutes apart for one hour or more.  Second (plus) baby: Contractions (tightening) less than 10 minutes apart and getting stronger.  *If less than 34 weeks: Contractions (tightening) more than 6 times in one hour.  Increase or change in vaginal discharge (note the color and amount)      Follow-up:  As scheduled in the clinic

## 2023-01-19 NOTE — NURSING NOTE
Patient presents to [facility] for RL2 for follow up due to mild hypolasia of aortic isthmus noted on an earlier US. Positive fetal movement. Denies LOF, vaginal bleeding or cramping/contractions. SBAR given to [facility] MD, see their note in Epic. After US, patient requesting BP to be taken. She states a mild headache and slight more puffiness noted in her hands upon waking this morning.  /79.  Dr. Palma would like patient to be evaluated for pre-eclampsia at [facility]. Patient's primary clinic ([facility]) aware and will assume care once patient arrives to [facility].

## 2023-01-20 ENCOUNTER — PRENATAL OFFICE VISIT (OUTPATIENT)
Dept: OBGYN | Facility: CLINIC | Age: 33
End: 2023-01-20
Payer: COMMERCIAL

## 2023-01-20 VITALS — SYSTOLIC BLOOD PRESSURE: 108 MMHG | BODY MASS INDEX: 25.37 KG/M2 | DIASTOLIC BLOOD PRESSURE: 68 MMHG | WEIGHT: 162 LBS

## 2023-01-20 DIAGNOSIS — O09.92 SUPERVISION OF HIGH RISK PREGNANCY IN SECOND TRIMESTER: Primary | ICD-10-CM

## 2023-01-20 DIAGNOSIS — E03.9 HYPOTHYROID IN PREGNANCY, ANTEPARTUM, THIRD TRIMESTER: ICD-10-CM

## 2023-01-20 DIAGNOSIS — O28.3 ABNORMAL FETAL ULTRASOUND: ICD-10-CM

## 2023-01-20 DIAGNOSIS — O99.283 HYPOTHYROID IN PREGNANCY, ANTEPARTUM, THIRD TRIMESTER: ICD-10-CM

## 2023-01-20 PROCEDURE — 99207 PR PRENATAL VISIT: CPT | Performed by: OBSTETRICS & GYNECOLOGY

## 2023-01-20 NOTE — PROGRESS NOTES
"Patient is being seen today for her 32 week OB visit.   BP is excellent today and no swelling, no HA, no vision changes.  Felt off at her MFM scan last week and felt some mild HA. Then felt hypotensive after laying flat for her U/S as  Usual and when sat up and felt sick BP checked and SBP in 140s and sent to MAC  All BPs were completely normal in the MAC and all pre-e labs normal  No HAs since and feeling well.   Discussed hypotension and compensatory hypertension in the acute phase, stress, fluid retention vs hydration status, etc  No si/sx of pre-e now and reviewed what those would be that would need to be assessed.  Concerned about elevated liver enzyme but the new lab ref ranges all changed and am not concerned with a 36 in setting of o/w completley normal BPs and other labs  Can feel a \"bubbly\" sensation in her stomach in epigastrium but Denies heartburn.   Discussed overproduction of acid and indigestion vs true reflux but would recommend 20mg prilosec trial to see if helps  Can feel a pulsating sensation vaginally. Says that she gets this same sensation when she is pulling out a tampon. Discussed vaginal wall  muscle spasms with increased uterine size and vaginal wall pressure. Maternity belt, laying down/resting and warm bath can all help      Reviewed BH vs true PTL and what she is describing doesn't sound like either vs as above.  Discussed general preparations for hospital, post-delivery, and delivery preferences now that she is going to be able to delivery at [facility] with resolution of any aortic/cardiac fetal issues  Still has the tiny excess tissue near fetal stomach of unknown etiology. Has a repeat growth and U/S with MFM soon and then will determine if any further imaging or surveillance is needed.   Would like the spectra breast pump. So Rx given for that to fill at med supply   Wondering about anxiety medication that can be given during delivery. Says that she has a little bit of hospital anxiety " since covid. Discussed that anxiety medications for DORCAS can be done during preg and after but that acute meds like benzos aren't recommended. Could certainly do low dose hydrox but would make her sleepy so depending on situation may not be worth the s.e but can always assess that at the time  Patient will schedule another growth at 36 weeks instead of 34 weeks since she had one with MFM on 1/19.   See again in 2 weeks.

## 2023-02-03 ENCOUNTER — PRENATAL OFFICE VISIT (OUTPATIENT)
Dept: OBGYN | Facility: CLINIC | Age: 33
End: 2023-02-03
Payer: COMMERCIAL

## 2023-02-03 VITALS
BODY MASS INDEX: 26.46 KG/M2 | DIASTOLIC BLOOD PRESSURE: 70 MMHG | SYSTOLIC BLOOD PRESSURE: 120 MMHG | HEIGHT: 67 IN | WEIGHT: 168.6 LBS

## 2023-02-03 DIAGNOSIS — O09.92 SUPERVISION OF HIGH RISK PREGNANCY IN SECOND TRIMESTER: Primary | ICD-10-CM

## 2023-02-03 DIAGNOSIS — O99.283 HYPOTHYROID IN PREGNANCY, ANTEPARTUM, THIRD TRIMESTER: ICD-10-CM

## 2023-02-03 DIAGNOSIS — E03.9 HYPOTHYROID IN PREGNANCY, ANTEPARTUM, THIRD TRIMESTER: ICD-10-CM

## 2023-02-03 DIAGNOSIS — O28.3 ABNORMAL FETAL ULTRASOUND: ICD-10-CM

## 2023-02-03 PROCEDURE — 99207 PR PRENATAL VISIT: CPT | Performed by: OBSTETRICS & GYNECOLOGY

## 2023-02-17 ENCOUNTER — ANCILLARY PROCEDURE (OUTPATIENT)
Dept: ULTRASOUND IMAGING | Facility: CLINIC | Age: 33
End: 2023-02-17
Payer: COMMERCIAL

## 2023-02-17 ENCOUNTER — PRENATAL OFFICE VISIT (OUTPATIENT)
Dept: OBGYN | Facility: CLINIC | Age: 33
End: 2023-02-17
Payer: COMMERCIAL

## 2023-02-17 VITALS
BODY MASS INDEX: 26.21 KG/M2 | DIASTOLIC BLOOD PRESSURE: 64 MMHG | WEIGHT: 167 LBS | SYSTOLIC BLOOD PRESSURE: 108 MMHG | HEIGHT: 67 IN

## 2023-02-17 DIAGNOSIS — O09.92 SUPERVISION OF HIGH RISK PREGNANCY IN SECOND TRIMESTER: ICD-10-CM

## 2023-02-17 DIAGNOSIS — E03.9 HYPOTHYROID IN PREGNANCY, ANTEPARTUM, THIRD TRIMESTER: ICD-10-CM

## 2023-02-17 DIAGNOSIS — Z36.85 SCREENING, ANTENATAL, FOR STREPTOCOCCUS B: ICD-10-CM

## 2023-02-17 DIAGNOSIS — O09.92 SUPERVISION OF HIGH RISK PREGNANCY IN SECOND TRIMESTER: Primary | ICD-10-CM

## 2023-02-17 DIAGNOSIS — O28.3 ABNORMAL FETAL ULTRASOUND: ICD-10-CM

## 2023-02-17 DIAGNOSIS — O99.283 HYPOTHYROID IN PREGNANCY, ANTEPARTUM, THIRD TRIMESTER: ICD-10-CM

## 2023-02-17 LAB — TSH SERPL DL<=0.005 MIU/L-ACNC: 1.56 UIU/ML (ref 0.3–4.2)

## 2023-02-17 PROCEDURE — 36415 COLL VENOUS BLD VENIPUNCTURE: CPT | Performed by: OBSTETRICS & GYNECOLOGY

## 2023-02-17 PROCEDURE — 76816 OB US FOLLOW-UP PER FETUS: CPT | Performed by: OBSTETRICS & GYNECOLOGY

## 2023-02-17 PROCEDURE — 84443 ASSAY THYROID STIM HORMONE: CPT | Performed by: OBSTETRICS & GYNECOLOGY

## 2023-02-17 PROCEDURE — 99207 PR PRENATAL VISIT: CPT | Performed by: OBSTETRICS & GYNECOLOGY

## 2023-02-17 PROCEDURE — 87653 STREP B DNA AMP PROBE: CPT | Performed by: OBSTETRICS & GYNECOLOGY

## 2023-02-18 DIAGNOSIS — E03.9 HYPOTHYROID IN PREGNANCY, ANTEPARTUM, FIRST TRIMESTER: ICD-10-CM

## 2023-02-18 DIAGNOSIS — O99.281 HYPOTHYROID IN PREGNANCY, ANTEPARTUM, FIRST TRIMESTER: ICD-10-CM

## 2023-02-18 LAB — GP B STREP DNA SPEC QL NAA+PROBE: NEGATIVE

## 2023-02-18 RX ORDER — LEVOTHYROXINE SODIUM 25 UG/1
25 TABLET ORAL DAILY
Qty: 90 TABLET | Refills: 1 | Status: SHIPPED | OUTPATIENT
Start: 2023-02-18 | End: 2023-05-23

## 2023-02-18 NOTE — RESULT ENCOUNTER NOTE
Nory,    Your TSH is great, right in the ideal range. So since we're not overcorrected, nor under, I say we just stick with the 25mcg through delivery and 6 weeks postpartum, and then we'll check it again at that point and reassess.    Radha Iraheta MD

## 2023-02-24 ENCOUNTER — PRENATAL OFFICE VISIT (OUTPATIENT)
Dept: OBGYN | Facility: CLINIC | Age: 33
End: 2023-02-24
Payer: COMMERCIAL

## 2023-02-24 VITALS — DIASTOLIC BLOOD PRESSURE: 72 MMHG | WEIGHT: 168 LBS | BODY MASS INDEX: 26.31 KG/M2 | SYSTOLIC BLOOD PRESSURE: 120 MMHG

## 2023-02-24 DIAGNOSIS — O99.283 HYPOTHYROID IN PREGNANCY, ANTEPARTUM, THIRD TRIMESTER: ICD-10-CM

## 2023-02-24 DIAGNOSIS — O28.3 ABNORMAL FETAL ULTRASOUND: ICD-10-CM

## 2023-02-24 DIAGNOSIS — O09.92 SUPERVISION OF HIGH RISK PREGNANCY IN SECOND TRIMESTER: Primary | ICD-10-CM

## 2023-02-24 DIAGNOSIS — E03.9 HYPOTHYROID IN PREGNANCY, ANTEPARTUM, THIRD TRIMESTER: ICD-10-CM

## 2023-02-24 PROCEDURE — 99207 PR PRENATAL VISIT: CPT | Performed by: OBSTETRICS & GYNECOLOGY

## 2023-02-24 NOTE — PROGRESS NOTES
Patient presents for a 37 week OB visit.   Reviewed her GBS neg status from last visit   Used the bathroom mnay times overnight last night. Felt stinging in the bladder while urinating. Denies cloudy urine or sx of uti otherwise and now today it's normal again.   Tons of FM still. No ctx. Feeling more vaginal pressure.   Probably wants an epidural but her mom is against it. Wants to know about the risks of getting an epidural. Discussed pros and short/long term risks from epidural etc.   Saw an internet video about nausea during labor and wondering if zofran could be administered through IV. Discussed times of nausea/emesis, BP changes, regional anesthesia and positional and how this is manged    Discussed eating restrictions before labor/delivery and its risks.    Cl/40-50/-3/post  Discussed cvx favorability for cvx ripening vs AROM and pitocin and reasons for MIOL at 41 weeks  Return in 1 wk.        Awake/Alert

## 2023-03-03 ENCOUNTER — PRENATAL OFFICE VISIT (OUTPATIENT)
Dept: OBGYN | Facility: CLINIC | Age: 33
End: 2023-03-03
Payer: COMMERCIAL

## 2023-03-03 VITALS
DIASTOLIC BLOOD PRESSURE: 70 MMHG | SYSTOLIC BLOOD PRESSURE: 118 MMHG | WEIGHT: 171.2 LBS | HEIGHT: 67 IN | BODY MASS INDEX: 26.87 KG/M2

## 2023-03-03 DIAGNOSIS — O09.92 SUPERVISION OF HIGH RISK PREGNANCY IN SECOND TRIMESTER: Primary | ICD-10-CM

## 2023-03-03 DIAGNOSIS — O99.283 HYPOTHYROID IN PREGNANCY, ANTEPARTUM, THIRD TRIMESTER: ICD-10-CM

## 2023-03-03 DIAGNOSIS — E03.9 HYPOTHYROID IN PREGNANCY, ANTEPARTUM, THIRD TRIMESTER: ICD-10-CM

## 2023-03-03 PROCEDURE — 99207 PR PRENATAL VISIT: CPT | Performed by: OBSTETRICS & GYNECOLOGY

## 2023-03-03 NOTE — PROGRESS NOTES
Patient presents for her 38 week OB visit.   Had a really long week in the office working 11 hour days. Had a burst of energy last night and doing a lot of stuff around the house and then felt like  baby was really tight and low.   Unsure if they were BH. Reviewed sx of bh/ctx timing and early vs true labor to call/come in for.  Feeling more pinching in the vagina  Discussed expectations for spont labor and/or SROM in the coming 2-3 weeks  Reviewed indication for IOL for medical vs elective reasons and membrane stripping but needing to be dilated to do this.  Cvx is still the same cl/40-50/-3/pos   Noticing edema in hands and legs. No longer wearing her wedding ring now. Weight gain is appropriate and 3# in last week is likely d/t edema.   FH is normal.  No HAs, no vision changes or pre-e sx  Return 1 wk

## 2023-03-04 NOTE — PROGRESS NOTES
Patient is doing overall well. Her last MFM scan 2 weeks ago continued to show the small echogenic area next to the fetal stomach but all growth and cardiac imaging was normal  She is feeling good FM  No CTX or BH that she realizes are that  No VB or LOF  No HAs or significant swelling though can have some intermittently that improves.  Did gain 6# this last 2 weeks but overall gain has been normal. Normal BP w/o signs of pre-e  Originally had an U/S today but since MFM scan was 2 weeks ago will move that out to her next appointment at 36 weeks  FH is small but c/w her prior patterns and trends  Return 2 weeks with U/S

## 2023-03-10 ENCOUNTER — PRENATAL OFFICE VISIT (OUTPATIENT)
Dept: OBGYN | Facility: CLINIC | Age: 33
End: 2023-03-10
Payer: COMMERCIAL

## 2023-03-10 VITALS
SYSTOLIC BLOOD PRESSURE: 120 MMHG | DIASTOLIC BLOOD PRESSURE: 60 MMHG | HEIGHT: 67 IN | WEIGHT: 170.8 LBS | BODY MASS INDEX: 26.81 KG/M2

## 2023-03-10 DIAGNOSIS — E03.9 HYPOTHYROID IN PREGNANCY, ANTEPARTUM, THIRD TRIMESTER: ICD-10-CM

## 2023-03-10 DIAGNOSIS — O99.283 HYPOTHYROID IN PREGNANCY, ANTEPARTUM, THIRD TRIMESTER: ICD-10-CM

## 2023-03-10 DIAGNOSIS — O09.92 SUPERVISION OF HIGH RISK PREGNANCY IN SECOND TRIMESTER: Primary | ICD-10-CM

## 2023-03-10 PROCEDURE — 99207 PR PRENATAL VISIT: CPT | Performed by: OBSTETRICS & GYNECOLOGY

## 2023-03-10 NOTE — PROGRESS NOTES
Patient presents for her 39 week OB visit.   Had insomnia last night. Has taken unisom in the past which helps her but didn't yesterday and then too late to take so was a rough night. Planning to take it going forward though.   Having a lot of BH but not significant enough in dhaval/pain.    Baby feels really big and feeling more heartburn.   Has times of more vaginal pressure and times where not that bad.  Cervix today is posterior, dimpling external os but still thick and baby is quite high and to her right. -3 station to -4 station  Reviewed IOL and medically that would be at 41 weeks. Reviewed ripening and pitocin, AROM. Discussed fetal lie and position of OP or OT potentially given cervical exam and how that may show in spont labor and in process of induction/ripening.  Return 1 week and if undelivered will plan and schedule induction at that time..

## 2023-03-17 ENCOUNTER — PRENATAL OFFICE VISIT (OUTPATIENT)
Dept: OBGYN | Facility: CLINIC | Age: 33
End: 2023-03-17
Payer: COMMERCIAL

## 2023-03-17 DIAGNOSIS — O09.92 SUPERVISION OF HIGH RISK PREGNANCY IN SECOND TRIMESTER: Primary | ICD-10-CM

## 2023-03-17 DIAGNOSIS — Z3A.40 40 WEEKS GESTATION OF PREGNANCY: ICD-10-CM

## 2023-03-17 PROCEDURE — 99207 PR PRENATAL VISIT: CPT | Performed by: OBSTETRICS & GYNECOLOGY

## 2023-03-17 NOTE — PROGRESS NOTES
Doing well.  No concerns today  Discussed signs of labor and when to call or come in  Reportable signs and symptoms discussed  Cervix is 1.5 cm dilated and soft  Reviewed labor precautions  IOL scheduled for next Thursday night at 730pm  Does have follow-up next Wednesday with Dr. Iraheta.

## 2023-03-21 ENCOUNTER — HOSPITAL ENCOUNTER (OUTPATIENT)
Facility: CLINIC | Age: 33
Discharge: HOME | End: 2023-03-21
Attending: OBSTETRICS & GYNECOLOGY | Admitting: OBSTETRICS & GYNECOLOGY
Payer: COMMERCIAL

## 2023-03-21 ENCOUNTER — TELEPHONE (OUTPATIENT)
Dept: OBGYN | Facility: CLINIC | Age: 33
End: 2023-03-21
Payer: COMMERCIAL

## 2023-03-21 VITALS
BODY MASS INDEX: 27.15 KG/M2 | TEMPERATURE: 97.3 F | RESPIRATION RATE: 16 BRPM | HEIGHT: 67 IN | DIASTOLIC BLOOD PRESSURE: 79 MMHG | WEIGHT: 173 LBS | SYSTOLIC BLOOD PRESSURE: 119 MMHG

## 2023-03-21 LAB — RUPTURE OF FETAL MEMBRANES BY ROM PLUS: NEGATIVE

## 2023-03-21 PROCEDURE — 59025 FETAL NON-STRESS TEST: CPT

## 2023-03-21 PROCEDURE — 84112 EVAL AMNIOTIC FLUID PROTEIN: CPT | Performed by: OBSTETRICS & GYNECOLOGY

## 2023-03-21 PROCEDURE — 59025 FETAL NON-STRESS TEST: CPT | Mod: 26 | Performed by: OBSTETRICS & GYNECOLOGY

## 2023-03-21 PROCEDURE — G0463 HOSPITAL OUTPT CLINIC VISIT: HCPCS | Mod: 25

## 2023-03-21 RX ORDER — PROCHLORPERAZINE 25 MG
25 SUPPOSITORY, RECTAL RECTAL EVERY 12 HOURS PRN
Status: DISCONTINUED | OUTPATIENT
Start: 2023-03-21 | End: 2023-03-21 | Stop reason: HOSPADM

## 2023-03-21 RX ORDER — PROCHLORPERAZINE MALEATE 5 MG
10 TABLET ORAL EVERY 6 HOURS PRN
Status: DISCONTINUED | OUTPATIENT
Start: 2023-03-21 | End: 2023-03-21 | Stop reason: HOSPADM

## 2023-03-21 RX ORDER — METOCLOPRAMIDE 10 MG/1
10 TABLET ORAL EVERY 6 HOURS PRN
Status: DISCONTINUED | OUTPATIENT
Start: 2023-03-21 | End: 2023-03-21 | Stop reason: HOSPADM

## 2023-03-21 RX ORDER — ONDANSETRON 4 MG/1
4 TABLET, ORALLY DISINTEGRATING ORAL EVERY 6 HOURS PRN
Status: DISCONTINUED | OUTPATIENT
Start: 2023-03-21 | End: 2023-03-21 | Stop reason: HOSPADM

## 2023-03-21 RX ORDER — ONDANSETRON 2 MG/ML
4 INJECTION INTRAMUSCULAR; INTRAVENOUS EVERY 6 HOURS PRN
Status: DISCONTINUED | OUTPATIENT
Start: 2023-03-21 | End: 2023-03-21 | Stop reason: HOSPADM

## 2023-03-21 RX ORDER — METOCLOPRAMIDE HYDROCHLORIDE 5 MG/ML
10 INJECTION INTRAMUSCULAR; INTRAVENOUS EVERY 6 HOURS PRN
Status: DISCONTINUED | OUTPATIENT
Start: 2023-03-21 | End: 2023-03-21 | Stop reason: HOSPADM

## 2023-03-21 ASSESSMENT — ACTIVITIES OF DAILY LIVING (ADL): ADLS_ACUITY_SCORE: 18

## 2023-03-21 NOTE — PLAN OF CARE
Patient comes to triage with c/o decreased fetal movement since last night. Pt stated baby had his normal big movements all of yesterday and last night, but they have been much smaller movements today.    EUM/US applied. VSS. Admission database obtained, and prenatal record reviewed.    Reactive NST obtained, and verified by Ade Macedo RN. Pt states she feels some movements that correlates with accels.    Update to Dr. Iraheta, and discharge order received.    When Pt was getting dressed, she noticed some wetness. MD updated and ROM+ ordered.    ROM+ collected and sent to lab. Resulted Negative. Pt feels reassured.    Discharge instructions given with verbal understanding, and patient discharged home with spouse.    [facility], or contact on-call MD with any further concerns.

## 2023-03-21 NOTE — TELEPHONE ENCOUNTER
DFM  Has felt baby at rest but no big kicks, just light movements.    Has been focusing on movements and eating and drinking.  Discussed kick counts-quantity not necessarily quality.     However, do to just light movements and not meeting kick count requirements MAC eval is appropriate.     Kim Clinton RN on 3/21/2023 at 9:15 AM

## 2023-03-21 NOTE — DISCHARGE INSTRUCTIONS
Discharge Instruction for Undelivered Patients      You were seen for: Fetal Assessment after decreased fetal movement.  We Consulted: Dr. Iraheta  You had (Test or Medicine):NST (reactive); ROM+ (negative)     Diet:   Drink 8 to 12 glasses of liquids (milk, juice, water) every day.  You may eat meals and snacks.     Activity:  Call your doctor or nurse midwife if your baby is moving less than usual.     Call your provider if you notice:  Swelling in your face or increased swelling in your hands or legs.  Headaches that are not relieved by Tylenol (acetaminophen).  Changes in your vision (blurring: seeing spots or stars.)  Nausea (sick to your stomach) and vomiting (throwing up).   Weight gain of 5 pounds or more per week.  Heartburn that doesn't go away.  Signs of bladder infection: pain when you urinate (use the toilet), need to go more often and more urgently.  The bag of zuluaga (rupture of membranes) breaks, or you notice leaking in your underwear.  Bright red blood in your underwear.  Abdominal (lower belly) or stomach pain.  For first baby: Contractions (tightening) less than 5 minutes apart for one hour or more.  Second (plus) baby: Contractions (tightening) less than 10 minutes apart and getting stronger.    Follow-up:  As scheduled in the clinic tomorrow, or contact on-call provider with concerns prior to this appt.

## 2023-03-21 NOTE — TELEPHONE ENCOUNTER
Reason for call:  Symptom   Symptom or request: Patient is 41 + and is feeling little baby movement.     Duration (how long have symptoms been present): today  Have you been treated for this before? No    Additional comments: Pt has appt tomorrow    Phone number to reach patient:  Cell number on file:    Telephone Information:   Mobile [phone]       Best Time:  ASAP    Can we leave a detailed message on this number?  YES    Travel screening: Not Applicable

## 2023-03-22 ENCOUNTER — PRENATAL OFFICE VISIT (OUTPATIENT)
Dept: OBGYN | Facility: CLINIC | Age: 33
End: 2023-03-22
Payer: COMMERCIAL

## 2023-03-22 VITALS
HEIGHT: 67 IN | SYSTOLIC BLOOD PRESSURE: 130 MMHG | WEIGHT: 173.2 LBS | DIASTOLIC BLOOD PRESSURE: 80 MMHG | BODY MASS INDEX: 27.18 KG/M2

## 2023-03-22 DIAGNOSIS — E03.9 HYPOTHYROID IN PREGNANCY, ANTEPARTUM, THIRD TRIMESTER: ICD-10-CM

## 2023-03-22 DIAGNOSIS — O09.92 SUPERVISION OF HIGH RISK PREGNANCY IN SECOND TRIMESTER: Primary | ICD-10-CM

## 2023-03-22 DIAGNOSIS — O99.283 HYPOTHYROID IN PREGNANCY, ANTEPARTUM, THIRD TRIMESTER: ICD-10-CM

## 2023-03-22 PROCEDURE — 99207 PR PRENATAL VISIT: CPT | Performed by: OBSTETRICS & GYNECOLOGY

## 2023-03-22 RX ORDER — LIDOCAINE 40 MG/G
CREAM TOPICAL
Status: CANCELLED | OUTPATIENT
Start: 2023-03-22

## 2023-03-22 RX ORDER — METOCLOPRAMIDE 5 MG/1
10 TABLET ORAL EVERY 6 HOURS PRN
Status: CANCELLED | OUTPATIENT
Start: 2023-03-22

## 2023-03-22 RX ORDER — PROCHLORPERAZINE 25 MG
25 SUPPOSITORY, RECTAL RECTAL EVERY 12 HOURS PRN
Status: CANCELLED | OUTPATIENT
Start: 2023-03-22

## 2023-03-22 RX ORDER — NALOXONE HYDROCHLORIDE 0.4 MG/ML
0.4 INJECTION, SOLUTION INTRAMUSCULAR; INTRAVENOUS; SUBCUTANEOUS
Status: CANCELLED | OUTPATIENT
Start: 2023-03-22

## 2023-03-22 RX ORDER — CITRIC ACID/SODIUM CITRATE 334-500MG
30 SOLUTION, ORAL ORAL
Status: CANCELLED | OUTPATIENT
Start: 2023-03-22

## 2023-03-22 RX ORDER — OXYTOCIN/0.9 % SODIUM CHLORIDE 30/500 ML
100-340 PLASTIC BAG, INJECTION (ML) INTRAVENOUS CONTINUOUS PRN
Status: CANCELLED | OUTPATIENT
Start: 2023-03-22

## 2023-03-22 RX ORDER — ACETAMINOPHEN 325 MG/1
975 TABLET ORAL EVERY 6 HOURS PRN
Status: CANCELLED | OUTPATIENT
Start: 2023-03-22

## 2023-03-22 RX ORDER — NALOXONE HYDROCHLORIDE 0.4 MG/ML
0.2 INJECTION, SOLUTION INTRAMUSCULAR; INTRAVENOUS; SUBCUTANEOUS
Status: CANCELLED | OUTPATIENT
Start: 2023-03-22

## 2023-03-22 RX ORDER — KETOROLAC TROMETHAMINE 30 MG/ML
30 INJECTION, SOLUTION INTRAMUSCULAR; INTRAVENOUS
Status: CANCELLED | OUTPATIENT
Start: 2023-03-22 | End: 2023-03-27

## 2023-03-22 RX ORDER — LEVOTHYROXINE SODIUM 25 UG/1
25 TABLET ORAL
Status: CANCELLED | OUTPATIENT
Start: 2023-03-23

## 2023-03-22 RX ORDER — METHYLERGONOVINE MALEATE 0.2 MG/ML
200 INJECTION INTRAVENOUS
Status: CANCELLED | OUTPATIENT
Start: 2023-03-22

## 2023-03-22 RX ORDER — MISOPROSTOL 100 UG/1
400 TABLET ORAL
Status: CANCELLED | OUTPATIENT
Start: 2023-03-22

## 2023-03-22 RX ORDER — OXYTOCIN 10 [USP'U]/ML
10 INJECTION, SOLUTION INTRAMUSCULAR; INTRAVENOUS
Status: CANCELLED | OUTPATIENT
Start: 2023-03-22

## 2023-03-22 RX ORDER — ONDANSETRON 4 MG/1
4 TABLET, ORALLY DISINTEGRATING ORAL EVERY 6 HOURS PRN
Status: CANCELLED | OUTPATIENT
Start: 2023-03-22

## 2023-03-22 RX ORDER — SODIUM CHLORIDE, SODIUM LACTATE, POTASSIUM CHLORIDE, CALCIUM CHLORIDE 600; 310; 30; 20 MG/100ML; MG/100ML; MG/100ML; MG/100ML
INJECTION, SOLUTION INTRAVENOUS CONTINUOUS PRN
Status: CANCELLED | OUTPATIENT
Start: 2023-03-22

## 2023-03-22 RX ORDER — ONDANSETRON 2 MG/ML
4 INJECTION INTRAMUSCULAR; INTRAVENOUS EVERY 6 HOURS PRN
Status: CANCELLED | OUTPATIENT
Start: 2023-03-22

## 2023-03-22 RX ORDER — CARBOPROST TROMETHAMINE 250 UG/ML
250 INJECTION, SOLUTION INTRAMUSCULAR
Status: CANCELLED | OUTPATIENT
Start: 2023-03-22

## 2023-03-22 RX ORDER — METOCLOPRAMIDE HYDROCHLORIDE 5 MG/ML
10 INJECTION INTRAMUSCULAR; INTRAVENOUS EVERY 6 HOURS PRN
Status: CANCELLED | OUTPATIENT
Start: 2023-03-22

## 2023-03-22 RX ORDER — HYDROXYZINE HYDROCHLORIDE 50 MG/1
50-100 TABLET, FILM COATED ORAL
Status: CANCELLED | OUTPATIENT
Start: 2023-03-22

## 2023-03-22 RX ORDER — MISOPROSTOL 200 UG/1
800 TABLET ORAL
Status: CANCELLED | OUTPATIENT
Start: 2023-03-22

## 2023-03-22 RX ORDER — OXYTOCIN/0.9 % SODIUM CHLORIDE 30/500 ML
1-24 PLASTIC BAG, INJECTION (ML) INTRAVENOUS CONTINUOUS
Status: CANCELLED | OUTPATIENT
Start: 2023-03-22

## 2023-03-22 RX ORDER — PROCHLORPERAZINE MALEATE 5 MG
10 TABLET ORAL EVERY 6 HOURS PRN
Status: CANCELLED | OUTPATIENT
Start: 2023-03-22

## 2023-03-22 RX ORDER — IBUPROFEN 200 MG
800 TABLET ORAL
Status: CANCELLED | OUTPATIENT
Start: 2023-03-22 | End: 2023-03-27

## 2023-03-22 RX ORDER — OXYTOCIN/0.9 % SODIUM CHLORIDE 30/500 ML
340 PLASTIC BAG, INJECTION (ML) INTRAVENOUS CONTINUOUS PRN
Status: CANCELLED | OUTPATIENT
Start: 2023-03-22

## 2023-03-22 RX ORDER — MISOPROSTOL 100 UG/1
25 TABLET ORAL EVERY 4 HOURS PRN
Status: CANCELLED | OUTPATIENT
Start: 2023-03-22

## 2023-03-22 NOTE — Clinical Note
Just KENYI this is a 41 week prime of mine coming for cytotec Thursday at 1930 and IOL Friday. I'll follow Friday and can probably cover until 630'vita or so but will need you guys for anything Thursday night or if it goes later Friday. Thx AJ

## 2023-03-22 NOTE — PROGRESS NOTES
Pt presents for her 41 week OB visit.   Had a lot of FM on Monday and normal to that point and then yesterday was really minimal.  Went to MAC and instantly had normal Cat 1 tracing with lots of accels and no more than some uterine irritability  Just as about to d/c had a lot of clear mucous type discharge as prior had had some but much less amount.  ROM plus was neg, no further fluid seen and no increase in ctx or labor, etc  States has had ore since membrane stripping with Dr. Greenberg last week.  Today the baby is moving less again but was moving earlier. Likely just running out of space and also could have more BH than she is aware of and then not sensing the FM as much  Not sure if feels like baby has dropped but is having more vaginal and pinching sensations in her cervix than she was.  However on exam was 1cm and thick and very high w/o ctx. Then with a ctx was 1.5/50-70 but still -3 station at best.    Discussed normal and elevated BPs at this stge. <140/90 is normal so 130/80s is acceptable and no other pre-e si or sx.     Reviewed ripening and 2 options for it, AROM, pitocin, epidural timing, flow of IOL plan, sleep meds, etc  Patient is currently scheduled for 1930 admission tomorrow and orders for cytotec and IOL will be placed today.  All questions answered and will proceed with ripening to start tomorrow.

## 2023-03-22 NOTE — PROGRESS NOTES
NST Interpretation    31yo  seen in MAC at 40+5wks for decreased fetal movement    NST reactive; baseline 130s, mod variability, +accels, no decels; Cat I tracing    [place]: occ ctx    Dispo:  Discharge to home per primary OB    Martha Mclean MD

## 2023-03-23 ENCOUNTER — TELEPHONE (OUTPATIENT)
Dept: OBGYN | Facility: CLINIC | Age: 33
End: 2023-03-23

## 2023-03-23 ENCOUNTER — HOSPITAL ENCOUNTER (INPATIENT)
Facility: CLINIC | Age: 33
LOS: 5 days | Discharge: HOME | End: 2023-03-28
Attending: OBSTETRICS & GYNECOLOGY | Admitting: OBSTETRICS & GYNECOLOGY
Payer: COMMERCIAL

## 2023-03-23 DIAGNOSIS — O09.92 SUPERVISION OF HIGH RISK PREGNANCY IN SECOND TRIMESTER: ICD-10-CM

## 2023-03-23 DIAGNOSIS — O99.283 HYPOTHYROID IN PREGNANCY, ANTEPARTUM, THIRD TRIMESTER: ICD-10-CM

## 2023-03-23 DIAGNOSIS — E03.9 HYPOTHYROID IN PREGNANCY, ANTEPARTUM, THIRD TRIMESTER: ICD-10-CM

## 2023-03-23 LAB
ABO/RH(D): NORMAL
ANTIBODY SCREEN: NEGATIVE
ERYTHROCYTE [DISTWIDTH] IN BLOOD BY AUTOMATED COUNT: 12.2 % (ref 10–15)
HCT VFR BLD AUTO: 41.5 % (ref 35–47)
HGB BLD-MCNC: 14.2 G/DL (ref 11.7–15.7)
MCH RBC QN AUTO: 29.8 PG (ref 26.5–33)
MCHC RBC AUTO-ENTMCNC: 34.2 G/DL (ref 31.5–36.5)
MCV RBC AUTO: 87 FL (ref 78–100)
PLATELET # BLD AUTO: 210 10E3/UL (ref 150–450)
RBC # BLD AUTO: 4.77 10E6/UL (ref 3.8–5.2)
SPECIMEN EXPIRATION DATE: NORMAL
WBC # BLD AUTO: 10.8 10E3/UL (ref 4–11)

## 2023-03-23 PROCEDURE — 120N000001 HC R&B MED SURG/OB

## 2023-03-23 PROCEDURE — 85027 COMPLETE CBC AUTOMATED: CPT | Performed by: OBSTETRICS & GYNECOLOGY

## 2023-03-23 PROCEDURE — 3E0P7VZ INTRODUCTION OF HORMONE INTO FEMALE REPRODUCTIVE, VIA NATURAL OR ARTIFICIAL OPENING: ICD-10-PCS | Performed by: OBSTETRICS & GYNECOLOGY

## 2023-03-23 PROCEDURE — 36415 COLL VENOUS BLD VENIPUNCTURE: CPT | Performed by: OBSTETRICS & GYNECOLOGY

## 2023-03-23 PROCEDURE — 86780 TREPONEMA PALLIDUM: CPT | Performed by: OBSTETRICS & GYNECOLOGY

## 2023-03-23 PROCEDURE — 250N000013 HC RX MED GY IP 250 OP 250 PS 637: Performed by: OBSTETRICS & GYNECOLOGY

## 2023-03-23 PROCEDURE — 86901 BLOOD TYPING SEROLOGIC RH(D): CPT | Performed by: OBSTETRICS & GYNECOLOGY

## 2023-03-23 RX ORDER — MISOPROSTOL 200 UG/1
800 TABLET ORAL
Status: DISCONTINUED | OUTPATIENT
Start: 2023-03-23 | End: 2023-03-25 | Stop reason: HOSPADM

## 2023-03-23 RX ORDER — OXYTOCIN/0.9 % SODIUM CHLORIDE 30/500 ML
1-24 PLASTIC BAG, INJECTION (ML) INTRAVENOUS CONTINUOUS
Status: DISCONTINUED | OUTPATIENT
Start: 2023-03-23 | End: 2023-03-25 | Stop reason: HOSPADM

## 2023-03-23 RX ORDER — IBUPROFEN 400 MG/1
800 TABLET, FILM COATED ORAL
Status: DISCONTINUED | OUTPATIENT
Start: 2023-03-23 | End: 2023-03-25

## 2023-03-23 RX ORDER — MISOPROSTOL 200 UG/1
400 TABLET ORAL
Status: DISCONTINUED | OUTPATIENT
Start: 2023-03-23 | End: 2023-03-25 | Stop reason: HOSPADM

## 2023-03-23 RX ORDER — LEVOTHYROXINE SODIUM 25 UG/1
25 TABLET ORAL
Status: DISCONTINUED | OUTPATIENT
Start: 2023-03-24 | End: 2023-03-25

## 2023-03-23 RX ORDER — NALOXONE HYDROCHLORIDE 0.4 MG/ML
0.2 INJECTION, SOLUTION INTRAMUSCULAR; INTRAVENOUS; SUBCUTANEOUS
Status: DISCONTINUED | OUTPATIENT
Start: 2023-03-23 | End: 2023-03-25 | Stop reason: HOSPADM

## 2023-03-23 RX ORDER — MISOPROSTOL 100 UG/1
25 TABLET ORAL EVERY 4 HOURS PRN
Status: DISCONTINUED | OUTPATIENT
Start: 2023-03-23 | End: 2023-03-25 | Stop reason: HOSPADM

## 2023-03-23 RX ORDER — OXYTOCIN 10 [USP'U]/ML
10 INJECTION, SOLUTION INTRAMUSCULAR; INTRAVENOUS
Status: DISCONTINUED | OUTPATIENT
Start: 2023-03-23 | End: 2023-03-25

## 2023-03-23 RX ORDER — ONDANSETRON 2 MG/ML
4 INJECTION INTRAMUSCULAR; INTRAVENOUS EVERY 6 HOURS PRN
Status: DISCONTINUED | OUTPATIENT
Start: 2023-03-23 | End: 2023-03-25 | Stop reason: HOSPADM

## 2023-03-23 RX ORDER — SODIUM CHLORIDE, SODIUM LACTATE, POTASSIUM CHLORIDE, CALCIUM CHLORIDE 600; 310; 30; 20 MG/100ML; MG/100ML; MG/100ML; MG/100ML
INJECTION, SOLUTION INTRAVENOUS CONTINUOUS PRN
Status: DISCONTINUED | OUTPATIENT
Start: 2023-03-23 | End: 2023-03-25 | Stop reason: HOSPADM

## 2023-03-23 RX ORDER — PROCHLORPERAZINE MALEATE 5 MG
10 TABLET ORAL EVERY 6 HOURS PRN
Status: DISCONTINUED | OUTPATIENT
Start: 2023-03-23 | End: 2023-03-25 | Stop reason: HOSPADM

## 2023-03-23 RX ORDER — KETOROLAC TROMETHAMINE 30 MG/ML
30 INJECTION, SOLUTION INTRAMUSCULAR; INTRAVENOUS
Status: DISCONTINUED | OUTPATIENT
Start: 2023-03-23 | End: 2023-03-25

## 2023-03-23 RX ORDER — METOCLOPRAMIDE HYDROCHLORIDE 5 MG/ML
10 INJECTION INTRAMUSCULAR; INTRAVENOUS EVERY 6 HOURS PRN
Status: DISCONTINUED | OUTPATIENT
Start: 2023-03-23 | End: 2023-03-25 | Stop reason: HOSPADM

## 2023-03-23 RX ORDER — OXYTOCIN 10 [USP'U]/ML
10 INJECTION, SOLUTION INTRAMUSCULAR; INTRAVENOUS
Status: DISCONTINUED | OUTPATIENT
Start: 2023-03-23 | End: 2023-03-25 | Stop reason: HOSPADM

## 2023-03-23 RX ORDER — HYDROXYZINE HYDROCHLORIDE 25 MG/1
50-100 TABLET, FILM COATED ORAL
Status: DISCONTINUED | OUTPATIENT
Start: 2023-03-23 | End: 2023-03-25 | Stop reason: HOSPADM

## 2023-03-23 RX ORDER — CARBOPROST TROMETHAMINE 250 UG/ML
250 INJECTION, SOLUTION INTRAMUSCULAR
Status: DISCONTINUED | OUTPATIENT
Start: 2023-03-23 | End: 2023-03-25 | Stop reason: HOSPADM

## 2023-03-23 RX ORDER — ACETAMINOPHEN 325 MG/1
975 TABLET ORAL EVERY 6 HOURS PRN
Status: DISCONTINUED | OUTPATIENT
Start: 2023-03-23 | End: 2023-03-25 | Stop reason: HOSPADM

## 2023-03-23 RX ORDER — NALOXONE HYDROCHLORIDE 0.4 MG/ML
0.4 INJECTION, SOLUTION INTRAMUSCULAR; INTRAVENOUS; SUBCUTANEOUS
Status: DISCONTINUED | OUTPATIENT
Start: 2023-03-23 | End: 2023-03-25 | Stop reason: HOSPADM

## 2023-03-23 RX ORDER — PROCHLORPERAZINE 25 MG
25 SUPPOSITORY, RECTAL RECTAL EVERY 12 HOURS PRN
Status: DISCONTINUED | OUTPATIENT
Start: 2023-03-23 | End: 2023-03-25 | Stop reason: HOSPADM

## 2023-03-23 RX ORDER — METOCLOPRAMIDE 10 MG/1
10 TABLET ORAL EVERY 6 HOURS PRN
Status: DISCONTINUED | OUTPATIENT
Start: 2023-03-23 | End: 2023-03-25 | Stop reason: HOSPADM

## 2023-03-23 RX ORDER — ONDANSETRON 4 MG/1
4 TABLET, ORALLY DISINTEGRATING ORAL EVERY 6 HOURS PRN
Status: DISCONTINUED | OUTPATIENT
Start: 2023-03-23 | End: 2023-03-25 | Stop reason: HOSPADM

## 2023-03-23 RX ORDER — OXYTOCIN/0.9 % SODIUM CHLORIDE 30/500 ML
340 PLASTIC BAG, INJECTION (ML) INTRAVENOUS CONTINUOUS PRN
Status: DISCONTINUED | OUTPATIENT
Start: 2023-03-23 | End: 2023-03-25 | Stop reason: HOSPADM

## 2023-03-23 RX ORDER — LIDOCAINE 40 MG/G
CREAM TOPICAL
Status: DISCONTINUED | OUTPATIENT
Start: 2023-03-23 | End: 2023-03-25 | Stop reason: HOSPADM

## 2023-03-23 RX ORDER — METHYLERGONOVINE MALEATE 0.2 MG/ML
200 INJECTION INTRAVENOUS
Status: DISCONTINUED | OUTPATIENT
Start: 2023-03-23 | End: 2023-03-25 | Stop reason: HOSPADM

## 2023-03-23 RX ORDER — TRANEXAMIC ACID 10 MG/ML
1 INJECTION, SOLUTION INTRAVENOUS EVERY 30 MIN PRN
Status: DISCONTINUED | OUTPATIENT
Start: 2023-03-23 | End: 2023-03-25 | Stop reason: HOSPADM

## 2023-03-23 RX ORDER — OXYTOCIN/0.9 % SODIUM CHLORIDE 30/500 ML
100-340 PLASTIC BAG, INJECTION (ML) INTRAVENOUS CONTINUOUS PRN
Status: DISCONTINUED | OUTPATIENT
Start: 2023-03-23 | End: 2023-03-25

## 2023-03-23 RX ORDER — CITRIC ACID/SODIUM CITRATE 334-500MG
30 SOLUTION, ORAL ORAL
Status: DISCONTINUED | OUTPATIENT
Start: 2023-03-23 | End: 2023-03-25 | Stop reason: HOSPADM

## 2023-03-23 RX ADMIN — MISOPROSTOL 25 MCG: 100 TABLET ORAL at 20:52

## 2023-03-23 RX ADMIN — HYDROXYZINE HYDROCHLORIDE 50 MG: 25 TABLET, FILM COATED ORAL at 21:01

## 2023-03-23 ASSESSMENT — ACTIVITIES OF DAILY LIVING (ADL)
ADLS_ACUITY_SCORE: 18
FALL_HISTORY_WITHIN_LAST_SIX_MONTHS: NO
DOING_ERRANDS_INDEPENDENTLY_DIFFICULTY: NO
DIFFICULTY_EATING/SWALLOWING: NO
WEAR_GLASSES_OR_BLIND: NO
CHANGE_IN_FUNCTIONAL_STATUS_SINCE_ONSET_OF_CURRENT_ILLNESS/INJURY: NO
DRESSING/BATHING_DIFFICULTY: NO
CONCENTRATING,_REMEMBERING_OR_MAKING_DECISIONS_DIFFICULTY: NO
TOILETING_ISSUES: NO
ADLS_ACUITY_SCORE: 18
WALKING_OR_CLIMBING_STAIRS_DIFFICULTY: NO

## 2023-03-23 NOTE — TELEPHONE ENCOUNTER
PT has a few questions about her medcation that she is taking for her [facility] L&D INDUCTION tomorrow 3/24

## 2023-03-23 NOTE — TELEPHONE ENCOUNTER
Discussed the process of cervical ripening. Nory had gone on line and was feeling anxious about cervical ripening medications.   Reassured we use them frequently, they are safe, and she will be monitored closely in L and D.   Questions answered.   Kim Clinton RN on 3/23/2023 at 12:36 PM

## 2023-03-24 ENCOUNTER — ANESTHESIA EVENT (OUTPATIENT)
Dept: OBGYN | Facility: CLINIC | Age: 33
End: 2023-03-24
Payer: COMMERCIAL

## 2023-03-24 ENCOUNTER — ANESTHESIA (OUTPATIENT)
Dept: OBGYN | Facility: CLINIC | Age: 33
End: 2023-03-24
Payer: COMMERCIAL

## 2023-03-24 LAB — T PALLIDUM AB SER QL: NONREACTIVE

## 2023-03-24 PROCEDURE — 250N000009 HC RX 250: Performed by: OBSTETRICS & GYNECOLOGY

## 2023-03-24 PROCEDURE — 250N000013 HC RX MED GY IP 250 OP 250 PS 637: Performed by: OBSTETRICS & GYNECOLOGY

## 2023-03-24 PROCEDURE — 250N000011 HC RX IP 250 OP 636: Performed by: OBSTETRICS & GYNECOLOGY

## 2023-03-24 PROCEDURE — 3E033VJ INTRODUCTION OF OTHER HORMONE INTO PERIPHERAL VEIN, PERCUTANEOUS APPROACH: ICD-10-PCS | Performed by: OBSTETRICS & GYNECOLOGY

## 2023-03-24 PROCEDURE — 250N000011 HC RX IP 250 OP 636: Performed by: ANESTHESIOLOGY

## 2023-03-24 PROCEDURE — 00HU33Z INSERTION OF INFUSION DEVICE INTO SPINAL CANAL, PERCUTANEOUS APPROACH: ICD-10-PCS | Performed by: ANESTHESIOLOGY

## 2023-03-24 PROCEDURE — 3E0R3BZ INTRODUCTION OF ANESTHETIC AGENT INTO SPINAL CANAL, PERCUTANEOUS APPROACH: ICD-10-PCS | Performed by: ANESTHESIOLOGY

## 2023-03-24 PROCEDURE — 120N000001 HC R&B MED SURG/OB

## 2023-03-24 PROCEDURE — 258N000003 HC RX IP 258 OP 636: Performed by: OBSTETRICS & GYNECOLOGY

## 2023-03-24 RX ORDER — FENTANYL CITRATE-0.9 % NACL/PF 10 MCG/ML
100 PLASTIC BAG, INJECTION (ML) INTRAVENOUS EVERY 5 MIN PRN
Status: DISCONTINUED | OUTPATIENT
Start: 2023-03-24 | End: 2023-03-25 | Stop reason: HOSPADM

## 2023-03-24 RX ORDER — ROPIVACAINE HYDROCHLORIDE 2 MG/ML
10 INJECTION, SOLUTION EPIDURAL; INFILTRATION; PERINEURAL ONCE
Status: DISCONTINUED | OUTPATIENT
Start: 2023-03-24 | End: 2023-03-25 | Stop reason: HOSPADM

## 2023-03-24 RX ORDER — NALBUPHINE HYDROCHLORIDE 20 MG/ML
2.5-5 INJECTION, SOLUTION INTRAMUSCULAR; INTRAVENOUS; SUBCUTANEOUS EVERY 6 HOURS PRN
Status: DISCONTINUED | OUTPATIENT
Start: 2023-03-24 | End: 2023-03-25

## 2023-03-24 RX ORDER — ROPIVACAINE HYDROCHLORIDE 2 MG/ML
INJECTION, SOLUTION EPIDURAL; INFILTRATION; PERINEURAL
Status: COMPLETED | OUTPATIENT
Start: 2023-03-24 | End: 2023-03-24

## 2023-03-24 RX ORDER — FENTANYL CITRATE 50 UG/ML
100 INJECTION, SOLUTION INTRAMUSCULAR; INTRAVENOUS
Status: DISCONTINUED | OUTPATIENT
Start: 2023-03-24 | End: 2023-03-25

## 2023-03-24 RX ADMIN — Medication 2 MILLI-UNITS/MIN: at 14:36

## 2023-03-24 RX ADMIN — Medication: at 23:12

## 2023-03-24 RX ADMIN — ROPIVACAINE HYDROCHLORIDE 10 ML: 2 INJECTION, SOLUTION EPIDURAL; INFILTRATION at 08:15

## 2023-03-24 RX ADMIN — FENTANYL CITRATE 100 MCG: 50 INJECTION, SOLUTION INTRAMUSCULAR; INTRAVENOUS at 05:46

## 2023-03-24 RX ADMIN — SODIUM CHLORIDE, POTASSIUM CHLORIDE, SODIUM LACTATE AND CALCIUM CHLORIDE: 600; 310; 30; 20 INJECTION, SOLUTION INTRAVENOUS at 20:38

## 2023-03-24 RX ADMIN — LEVOTHYROXINE SODIUM 25 MCG: 25 TABLET ORAL at 08:39

## 2023-03-24 RX ADMIN — Medication: at 08:17

## 2023-03-24 RX ADMIN — METOCLOPRAMIDE 10 MG: 5 INJECTION, SOLUTION INTRAMUSCULAR; INTRAVENOUS at 05:05

## 2023-03-24 RX ADMIN — SODIUM CHLORIDE, POTASSIUM CHLORIDE, SODIUM LACTATE AND CALCIUM CHLORIDE: 600; 310; 30; 20 INJECTION, SOLUTION INTRAVENOUS at 05:41

## 2023-03-24 RX ADMIN — Medication: at 15:57

## 2023-03-24 RX ADMIN — MISOPROSTOL 25 MCG: 100 TABLET ORAL at 00:48

## 2023-03-24 ASSESSMENT — ACTIVITIES OF DAILY LIVING (ADL)
ADLS_ACUITY_SCORE: 18

## 2023-03-24 NOTE — ANESTHESIA PROCEDURE NOTES
"Epidural catheter Procedure Note    Pre-Procedure   Staff -        Anesthesiologist:  James Cooper MD       Performed By: anesthesiologist       Location: OB       Pre-Anesthestic Checklist: patient identified, IV checked, site marked, risks and benefits discussed, informed consent, monitors and equipment checked, pre-op evaluation, at physician/surgeon's request and post-op pain management  Timeout:       Correct Patient: Yes        Correct Procedure: Yes        Correct Site: Yes        Correct Position: Yes   Procedure Documentation  Procedure: epidural catheter       Diagnosis: Labor Pain       Patient Position: sitting       Patient Prep/Sterile Barriers: sterile gloves, mask, patient draped       Skin prep: Betadine       Local skin infiltrated with 4 mL of 1% lidocaine.        Insertion Site: L3-4. (midline approach).       Technique: LORT saline        LACY at 4.75 cm.       Needle Type: ToArgusy needle       Needle Gauge: 17.        Needle Length (Inches): 3.5        Catheter: 19 G.          Catheter threaded easily.         4 cm epidural space.         Threaded 8.75 cm at skin.         # of attempts: 3 and  # of redirects:  3    Assessment/Narrative         Paresthesias: No.       Test dose of 3 mL lidocaine 1.5% w/ 1:200,000 epinephrine at 08:12 CDT.         Test dose negative, 3 minutes after injection, for signs of intravascular, subdural, or intrathecal injection.       Insertion/Infusion Method: LORT saline       Aspiration negative for Heme or CSF via Epidural Catheter.    Medication(s) Administered   0.2% Ropivacaine (Epidural) - EPIDURAL   10 mL - 3/24/2023 8:15:00 AM   Comments:  Patient tolerated procedure well      FOR [facility] ([facility]) ONLY:   Pain Team Contact information: please page the Pain Team Via intelworks. Search \"Pain\". During daytime hours, please page the attending first. At night please page the resident first.    "

## 2023-03-24 NOTE — H&P
No significant change in general health status based on examination of the patient, review of Nursing Admission Database and prenatal record. Ripening with cytotec x2 overnight for postdates. This AM 2 and had SROM with clear fluid just after 0700. Getting epidural now. Pitocin if indicated   Anticipate     EFW: 7lb 8oz

## 2023-03-24 NOTE — PLAN OF CARE
Pt presents to L and D for IOL. Pt instructed to put on gown, climb into bed, and got permission to place monitors on her belly to monitor baby. Pt oriented to room and POC. Questions asked and answered.

## 2023-03-24 NOTE — ANESTHESIA PREPROCEDURE EVALUATION
"Anesthesia Pre-Procedure Evaluation    Patient: Nory Woods   MRN: [id number] : 1990        Procedure :           Past Medical History:   Diagnosis Date     Depressive disorder      History of PCOS     had amenorrhea in college. pt reports hirsutism most of her life but also \"very skinny\" in college and occasionally had cysts. normal and regular periods in adult life     Hypothyroidism      Mild depression      Tinnitus, bilateral       Past Surgical History:   Procedure Laterality Date     DILATION AND CURETTAGE SUCTION N/A 2021    Procedure: DILATION AND CURETTAGE, UTERUS, USING SUCTION;  Surgeon: Radha Iraheta MD;  Location: [facility] OR     WISDOM TOOTH EXTRACTION        No Known Allergies   Social History     Tobacco Use     Smoking status: Never     Smokeless tobacco: Never   Substance Use Topics     Alcohol use: Not Currently      Wt Readings from Last 1 Encounters:   23 78.6 kg (173 lb 3.2 oz)        Anesthesia Evaluation            ROS/MED HX  ENT/Pulmonary:    (-) asthma   Neurologic:  - neg neurologic ROS     Cardiovascular:    (-) PIH   METS/Exercise Tolerance:     Hematologic:     (+) no anticoagulation therapy, no coagulopathy,     Musculoskeletal:       GI/Hepatic:     (+) GERD,     Renal/Genitourinary:       Endo: Comment: PCOS      Psychiatric/Substance Use:     (+) psychiatric history depression     Infectious Disease:       Malignancy:       Other:            Physical Exam    Airway        Mallampati: II   TM distance: > 3 FB   Neck ROM: full     Respiratory Devices and Support         Dental  no notable dental history         Cardiovascular   cardiovascular exam normal          Pulmonary   pulmonary exam normal                OUTSIDE LABS:  CBC:   Lab Results   Component Value Date    WBC 10.8 2023    WBC 9.5 2023    HGB 14.2 2023    HGB 13.1 2023    HCT 41.5 2023    HCT 39.6 2023     2023     2023 "     BMP:   Lab Results   Component Value Date     (L) 01/19/2023     12/31/2021    POTASSIUM 4.0 01/19/2023    POTASSIUM 3.7 12/31/2021    CHLORIDE 104 01/19/2023    CHLORIDE 107 12/31/2021    CO2 22 01/19/2023    CO2 25 12/31/2021    BUN 6.0 01/19/2023    BUN 9 12/31/2021    CR 0.42 (L) 01/19/2023    CR 0.57 12/31/2021    GLC 91 01/19/2023     (H) 12/31/2021     COAGS: No results found for: PTT, INR, FIBR  POC: No results found for: BGM, HCG, HCGS  HEPATIC:   Lab Results   Component Value Date    ALBUMIN 3.6 01/19/2023    PROTTOTAL 6.1 (L) 01/19/2023    ALT 36 (H) 01/19/2023    AST 30 01/19/2023    ALKPHOS 103 01/19/2023    BILITOTAL 0.2 01/19/2023     OTHER:   Lab Results   Component Value Date    LACT 1.2 12/31/2021    CLAUDIO 9.0 01/19/2023    TSH 1.56 02/17/2023       Anesthesia Plan    ASA Status:  2      Anesthesia Type: Epidural.              Consents    Anesthesia Plan(s) and associated risks, benefits, and realistic alternatives discussed. Questions answered and patient/representative(s) expressed understanding.    - Discussed:     - Discussed with:  Patient         Postoperative Care            Comments:    Other Comments: Orders to manage the epidural infusion have been entered, and through coordination with the nurse, we will continute to manage and monitor the patient's labor epidural.  We will continuously be available to adjust as needed thruout the entire L&D process.             James Cooper MD

## 2023-03-25 PROCEDURE — 250N000013 HC RX MED GY IP 250 OP 250 PS 637: Performed by: OBSTETRICS & GYNECOLOGY

## 2023-03-25 PROCEDURE — 59510 CESAREAN DELIVERY: CPT | Performed by: STUDENT IN AN ORGANIZED HEALTH CARE EDUCATION/TRAINING PROGRAM

## 2023-03-25 PROCEDURE — 250N000013 HC RX MED GY IP 250 OP 250 PS 637: Performed by: STUDENT IN AN ORGANIZED HEALTH CARE EDUCATION/TRAINING PROGRAM

## 2023-03-25 PROCEDURE — 250N000011 HC RX IP 250 OP 636: Performed by: OBSTETRICS & GYNECOLOGY

## 2023-03-25 PROCEDURE — 250N000013 HC RX MED GY IP 250 OP 250 PS 637

## 2023-03-25 PROCEDURE — 250N000011 HC RX IP 250 OP 636: Performed by: ANESTHESIOLOGY

## 2023-03-25 PROCEDURE — 250N000009 HC RX 250: Performed by: STUDENT IN AN ORGANIZED HEALTH CARE EDUCATION/TRAINING PROGRAM

## 2023-03-25 PROCEDURE — 250N000011 HC RX IP 250 OP 636: Performed by: STUDENT IN AN ORGANIZED HEALTH CARE EDUCATION/TRAINING PROGRAM

## 2023-03-25 PROCEDURE — 710N000009 HC RECOVERY PHASE 1, LEVEL 1, PER MIN: Performed by: STUDENT IN AN ORGANIZED HEALTH CARE EDUCATION/TRAINING PROGRAM

## 2023-03-25 PROCEDURE — 250N000009 HC RX 250: Performed by: ANESTHESIOLOGY

## 2023-03-25 PROCEDURE — 258N000003 HC RX IP 258 OP 636: Performed by: OBSTETRICS & GYNECOLOGY

## 2023-03-25 PROCEDURE — 272N000001 HC OR GENERAL SUPPLY STERILE: Performed by: STUDENT IN AN ORGANIZED HEALTH CARE EDUCATION/TRAINING PROGRAM

## 2023-03-25 PROCEDURE — 258N000003 HC RX IP 258 OP 636: Performed by: ANESTHESIOLOGY

## 2023-03-25 PROCEDURE — 370N000017 HC ANESTHESIA TECHNICAL FEE, PER MIN: Performed by: STUDENT IN AN ORGANIZED HEALTH CARE EDUCATION/TRAINING PROGRAM

## 2023-03-25 PROCEDURE — 120N000012 HC R&B POSTPARTUM

## 2023-03-25 PROCEDURE — 360N000076 HC SURGERY LEVEL 3, PER MIN: Performed by: STUDENT IN AN ORGANIZED HEALTH CARE EDUCATION/TRAINING PROGRAM

## 2023-03-25 RX ORDER — LEVOTHYROXINE SODIUM 25 UG/1
25 TABLET ORAL
Status: DISCONTINUED | OUTPATIENT
Start: 2023-03-26 | End: 2023-03-28 | Stop reason: HOSPADM

## 2023-03-25 RX ORDER — ACETAMINOPHEN 325 MG/1
975 TABLET ORAL EVERY 6 HOURS
Status: DISPENSED | OUTPATIENT
Start: 2023-03-25 | End: 2023-03-28

## 2023-03-25 RX ORDER — BISACODYL 10 MG
10 SUPPOSITORY, RECTAL RECTAL DAILY PRN
Status: DISCONTINUED | OUTPATIENT
Start: 2023-03-27 | End: 2023-03-28 | Stop reason: HOSPADM

## 2023-03-25 RX ORDER — TRANEXAMIC ACID 10 MG/ML
1 INJECTION, SOLUTION INTRAVENOUS EVERY 30 MIN PRN
Status: DISCONTINUED | OUTPATIENT
Start: 2023-03-25 | End: 2023-03-28 | Stop reason: HOSPADM

## 2023-03-25 RX ORDER — IBUPROFEN 400 MG/1
800 TABLET, FILM COATED ORAL EVERY 6 HOURS PRN
Status: DISCONTINUED | OUTPATIENT
Start: 2023-03-25 | End: 2023-03-28 | Stop reason: HOSPADM

## 2023-03-25 RX ORDER — AZITHROMYCIN 500 MG/1
500 INJECTION, POWDER, LYOPHILIZED, FOR SOLUTION INTRAVENOUS
Status: COMPLETED | OUTPATIENT
Start: 2023-03-25 | End: 2023-03-25

## 2023-03-25 RX ORDER — NALOXONE HYDROCHLORIDE 0.4 MG/ML
0.2 INJECTION, SOLUTION INTRAMUSCULAR; INTRAVENOUS; SUBCUTANEOUS
Status: DISCONTINUED | OUTPATIENT
Start: 2023-03-25 | End: 2023-03-28 | Stop reason: HOSPADM

## 2023-03-25 RX ORDER — METOCLOPRAMIDE 10 MG/1
10 TABLET ORAL EVERY 6 HOURS PRN
Status: DISCONTINUED | OUTPATIENT
Start: 2023-03-25 | End: 2023-03-28 | Stop reason: HOSPADM

## 2023-03-25 RX ORDER — MODIFIED LANOLIN
OINTMENT (GRAM) TOPICAL
Status: DISCONTINUED | OUTPATIENT
Start: 2023-03-25 | End: 2023-03-28 | Stop reason: HOSPADM

## 2023-03-25 RX ORDER — AMOXICILLIN 250 MG
1 CAPSULE ORAL 2 TIMES DAILY
Status: DISCONTINUED | OUTPATIENT
Start: 2023-03-25 | End: 2023-03-28 | Stop reason: HOSPADM

## 2023-03-25 RX ORDER — METOCLOPRAMIDE HYDROCHLORIDE 5 MG/ML
10 INJECTION INTRAMUSCULAR; INTRAVENOUS EVERY 6 HOURS PRN
Status: DISCONTINUED | OUTPATIENT
Start: 2023-03-25 | End: 2023-03-28 | Stop reason: HOSPADM

## 2023-03-25 RX ORDER — OXYCODONE HYDROCHLORIDE 5 MG/1
5 TABLET ORAL EVERY 4 HOURS PRN
Status: DISCONTINUED | OUTPATIENT
Start: 2023-03-25 | End: 2023-03-28 | Stop reason: HOSPADM

## 2023-03-25 RX ORDER — ACETAMINOPHEN 325 MG/1
975 TABLET ORAL ONCE
Status: COMPLETED | OUTPATIENT
Start: 2023-03-25 | End: 2023-03-25

## 2023-03-25 RX ORDER — ACETAMINOPHEN 325 MG/1
650 TABLET ORAL EVERY 4 HOURS PRN
Status: DISCONTINUED | OUTPATIENT
Start: 2023-03-28 | End: 2023-03-28 | Stop reason: HOSPADM

## 2023-03-25 RX ORDER — MISOPROSTOL 200 UG/1
400 TABLET ORAL
Status: DISCONTINUED | OUTPATIENT
Start: 2023-03-25 | End: 2023-03-25 | Stop reason: HOSPADM

## 2023-03-25 RX ORDER — CEFAZOLIN SODIUM 2 G/100ML
2 INJECTION, SOLUTION INTRAVENOUS SEE ADMIN INSTRUCTIONS
Status: DISCONTINUED | OUTPATIENT
Start: 2023-03-25 | End: 2023-03-25 | Stop reason: HOSPADM

## 2023-03-25 RX ORDER — PROCHLORPERAZINE MALEATE 10 MG
10 TABLET ORAL EVERY 6 HOURS PRN
Status: DISCONTINUED | OUTPATIENT
Start: 2023-03-25 | End: 2023-03-28 | Stop reason: HOSPADM

## 2023-03-25 RX ORDER — MISOPROSTOL 200 UG/1
400 TABLET ORAL
Status: DISCONTINUED | OUTPATIENT
Start: 2023-03-25 | End: 2023-03-28 | Stop reason: HOSPADM

## 2023-03-25 RX ORDER — NALOXONE HYDROCHLORIDE 0.4 MG/ML
0.4 INJECTION, SOLUTION INTRAMUSCULAR; INTRAVENOUS; SUBCUTANEOUS
Status: DISCONTINUED | OUTPATIENT
Start: 2023-03-25 | End: 2023-03-28 | Stop reason: HOSPADM

## 2023-03-25 RX ORDER — AMOXICILLIN 250 MG
2 CAPSULE ORAL 2 TIMES DAILY
Status: DISCONTINUED | OUTPATIENT
Start: 2023-03-25 | End: 2023-03-28 | Stop reason: HOSPADM

## 2023-03-25 RX ORDER — OXYTOCIN 10 [USP'U]/ML
10 INJECTION, SOLUTION INTRAMUSCULAR; INTRAVENOUS
Status: DISCONTINUED | OUTPATIENT
Start: 2023-03-25 | End: 2023-03-28 | Stop reason: HOSPADM

## 2023-03-25 RX ORDER — CEFAZOLIN SODIUM/WATER 2 G/20 ML
SYRINGE (ML) INTRAVENOUS
Status: DISCONTINUED
Start: 2023-03-25 | End: 2023-03-25 | Stop reason: HOSPADM

## 2023-03-25 RX ORDER — ACETAMINOPHEN 325 MG/1
TABLET ORAL
Status: COMPLETED
Start: 2023-03-25 | End: 2023-03-25

## 2023-03-25 RX ORDER — ACETAMINOPHEN 325 MG/1
TABLET ORAL
Status: DISCONTINUED
Start: 2023-03-25 | End: 2023-03-25 | Stop reason: HOSPADM

## 2023-03-25 RX ORDER — CARBOPROST TROMETHAMINE 250 UG/ML
250 INJECTION, SOLUTION INTRAMUSCULAR
Status: DISCONTINUED | OUTPATIENT
Start: 2023-03-25 | End: 2023-03-25 | Stop reason: HOSPADM

## 2023-03-25 RX ORDER — HYDROMORPHONE HCL IN WATER/PF 6 MG/30 ML
.3-.5 PATIENT CONTROLLED ANALGESIA SYRINGE INTRAVENOUS EVERY 30 MIN PRN
Status: DISCONTINUED | OUTPATIENT
Start: 2023-03-25 | End: 2023-03-28 | Stop reason: HOSPADM

## 2023-03-25 RX ORDER — HYDROCORTISONE 25 MG/G
CREAM TOPICAL 3 TIMES DAILY PRN
Status: DISCONTINUED | OUTPATIENT
Start: 2023-03-25 | End: 2023-03-28 | Stop reason: HOSPADM

## 2023-03-25 RX ORDER — LIDOCAINE 40 MG/G
CREAM TOPICAL
Status: DISCONTINUED | OUTPATIENT
Start: 2023-03-25 | End: 2023-03-25 | Stop reason: HOSPADM

## 2023-03-25 RX ORDER — LIDOCAINE 40 MG/G
CREAM TOPICAL
Status: DISCONTINUED | OUTPATIENT
Start: 2023-03-25 | End: 2023-03-28 | Stop reason: HOSPADM

## 2023-03-25 RX ORDER — ONDANSETRON 2 MG/ML
INJECTION INTRAMUSCULAR; INTRAVENOUS PRN
Status: DISCONTINUED | OUTPATIENT
Start: 2023-03-25 | End: 2023-03-25

## 2023-03-25 RX ORDER — KETOROLAC TROMETHAMINE 30 MG/ML
30 INJECTION, SOLUTION INTRAMUSCULAR; INTRAVENOUS EVERY 6 HOURS
Status: DISPENSED | OUTPATIENT
Start: 2023-03-25 | End: 2023-03-26

## 2023-03-25 RX ORDER — MISOPROSTOL 200 UG/1
800 TABLET ORAL
Status: DISCONTINUED | OUTPATIENT
Start: 2023-03-25 | End: 2023-03-25 | Stop reason: HOSPADM

## 2023-03-25 RX ORDER — CEFAZOLIN SODIUM 2 G/100ML
2 INJECTION, SOLUTION INTRAVENOUS
Status: COMPLETED | OUTPATIENT
Start: 2023-03-25 | End: 2023-03-25

## 2023-03-25 RX ORDER — LIDOCAINE HCL/EPINEPHRINE/PF 2%-1:200K
VIAL (ML) INJECTION PRN
Status: DISCONTINUED | OUTPATIENT
Start: 2023-03-25 | End: 2023-03-25

## 2023-03-25 RX ORDER — MISOPROSTOL 200 UG/1
800 TABLET ORAL
Status: DISCONTINUED | OUTPATIENT
Start: 2023-03-25 | End: 2023-03-28 | Stop reason: HOSPADM

## 2023-03-25 RX ORDER — DEXTROSE, SODIUM CHLORIDE, SODIUM LACTATE, POTASSIUM CHLORIDE, AND CALCIUM CHLORIDE 5; .6; .31; .03; .02 G/100ML; G/100ML; G/100ML; G/100ML; G/100ML
INJECTION, SOLUTION INTRAVENOUS CONTINUOUS
Status: DISCONTINUED | OUTPATIENT
Start: 2023-03-25 | End: 2023-03-28 | Stop reason: HOSPADM

## 2023-03-25 RX ORDER — ONDANSETRON 4 MG/1
4 TABLET, ORALLY DISINTEGRATING ORAL EVERY 6 HOURS PRN
Status: DISCONTINUED | OUTPATIENT
Start: 2023-03-25 | End: 2023-03-28 | Stop reason: HOSPADM

## 2023-03-25 RX ORDER — ONDANSETRON 2 MG/ML
4 INJECTION INTRAMUSCULAR; INTRAVENOUS EVERY 6 HOURS PRN
Status: DISCONTINUED | OUTPATIENT
Start: 2023-03-25 | End: 2023-03-28 | Stop reason: HOSPADM

## 2023-03-25 RX ORDER — OXYTOCIN/0.9 % SODIUM CHLORIDE 30/500 ML
100-340 PLASTIC BAG, INJECTION (ML) INTRAVENOUS CONTINUOUS PRN
Status: DISCONTINUED | OUTPATIENT
Start: 2023-03-25 | End: 2023-03-28 | Stop reason: HOSPADM

## 2023-03-25 RX ORDER — PROCHLORPERAZINE 25 MG
25 SUPPOSITORY, RECTAL RECTAL EVERY 12 HOURS PRN
Status: DISCONTINUED | OUTPATIENT
Start: 2023-03-25 | End: 2023-03-28 | Stop reason: HOSPADM

## 2023-03-25 RX ORDER — CITRIC ACID/SODIUM CITRATE 334-500MG
SOLUTION, ORAL ORAL
Status: COMPLETED
Start: 2023-03-25 | End: 2023-03-25

## 2023-03-25 RX ORDER — OXYTOCIN 10 [USP'U]/ML
10 INJECTION, SOLUTION INTRAMUSCULAR; INTRAVENOUS
Status: DISCONTINUED | OUTPATIENT
Start: 2023-03-25 | End: 2023-03-25 | Stop reason: HOSPADM

## 2023-03-25 RX ORDER — CARBOPROST TROMETHAMINE 250 UG/ML
250 INJECTION, SOLUTION INTRAMUSCULAR
Status: DISCONTINUED | OUTPATIENT
Start: 2023-03-25 | End: 2023-03-28 | Stop reason: HOSPADM

## 2023-03-25 RX ORDER — AZITHROMYCIN 500 MG/1
INJECTION, POWDER, LYOPHILIZED, FOR SOLUTION INTRAVENOUS
Status: DISCONTINUED
Start: 2023-03-25 | End: 2023-03-25 | Stop reason: HOSPADM

## 2023-03-25 RX ORDER — METHYLERGONOVINE MALEATE 0.2 MG/ML
200 INJECTION INTRAVENOUS
Status: DISCONTINUED | OUTPATIENT
Start: 2023-03-25 | End: 2023-03-28 | Stop reason: HOSPADM

## 2023-03-25 RX ORDER — METHYLERGONOVINE MALEATE 0.2 MG/ML
200 INJECTION INTRAVENOUS
Status: DISCONTINUED | OUTPATIENT
Start: 2023-03-25 | End: 2023-03-25 | Stop reason: HOSPADM

## 2023-03-25 RX ORDER — OXYTOCIN/0.9 % SODIUM CHLORIDE 30/500 ML
340 PLASTIC BAG, INJECTION (ML) INTRAVENOUS CONTINUOUS PRN
Status: DISCONTINUED | OUTPATIENT
Start: 2023-03-25 | End: 2023-03-28 | Stop reason: HOSPADM

## 2023-03-25 RX ORDER — SIMETHICONE 80 MG
80 TABLET,CHEWABLE ORAL 4 TIMES DAILY PRN
Status: DISCONTINUED | OUTPATIENT
Start: 2023-03-25 | End: 2023-03-28 | Stop reason: HOSPADM

## 2023-03-25 RX ORDER — OXYTOCIN/0.9 % SODIUM CHLORIDE 30/500 ML
340 PLASTIC BAG, INJECTION (ML) INTRAVENOUS CONTINUOUS PRN
Status: COMPLETED | OUTPATIENT
Start: 2023-03-25 | End: 2023-03-25

## 2023-03-25 RX ORDER — TRANEXAMIC ACID 10 MG/ML
1 INJECTION, SOLUTION INTRAVENOUS EVERY 30 MIN PRN
Status: DISCONTINUED | OUTPATIENT
Start: 2023-03-25 | End: 2023-03-25 | Stop reason: HOSPADM

## 2023-03-25 RX ORDER — SODIUM CHLORIDE, SODIUM LACTATE, POTASSIUM CHLORIDE, CALCIUM CHLORIDE 600; 310; 30; 20 MG/100ML; MG/100ML; MG/100ML; MG/100ML
INJECTION, SOLUTION INTRAVENOUS CONTINUOUS
Status: DISCONTINUED | OUTPATIENT
Start: 2023-03-25 | End: 2023-03-25 | Stop reason: HOSPADM

## 2023-03-25 RX ORDER — CITRIC ACID/SODIUM CITRATE 334-500MG
30 SOLUTION, ORAL ORAL
Status: COMPLETED | OUTPATIENT
Start: 2023-03-25 | End: 2023-03-25

## 2023-03-25 RX ORDER — MORPHINE SULFATE 1 MG/ML
INJECTION, SOLUTION EPIDURAL; INTRATHECAL; INTRAVENOUS PRN
Status: DISCONTINUED | OUTPATIENT
Start: 2023-03-25 | End: 2023-03-25

## 2023-03-25 RX ADMIN — KETOROLAC TROMETHAMINE 30 MG: 30 INJECTION, SOLUTION INTRAMUSCULAR at 23:43

## 2023-03-25 RX ADMIN — LEVOTHYROXINE SODIUM 25 MCG: 25 TABLET ORAL at 07:09

## 2023-03-25 RX ADMIN — Medication 30 ML: at 04:22

## 2023-03-25 RX ADMIN — ACETAMINOPHEN 975 MG: 325 TABLET, FILM COATED ORAL at 17:46

## 2023-03-25 RX ADMIN — PHENYLEPHRINE HYDROCHLORIDE 0.3 MCG/KG/MIN: 10 INJECTION INTRAVENOUS at 04:59

## 2023-03-25 RX ADMIN — SODIUM CITRATE AND CITRIC ACID MONOHYDRATE 30 ML: 500; 334 SOLUTION ORAL at 04:22

## 2023-03-25 RX ADMIN — PHENYLEPHRINE HYDROCHLORIDE 100 MCG: 10 INJECTION INTRAVENOUS at 05:19

## 2023-03-25 RX ADMIN — ACETAMINOPHEN 975 MG: 325 TABLET ORAL at 04:22

## 2023-03-25 RX ADMIN — KETOROLAC TROMETHAMINE 30 MG: 30 INJECTION, SOLUTION INTRAMUSCULAR at 11:46

## 2023-03-25 RX ADMIN — LIDOCAINE HYDROCHLORIDE,EPINEPHRINE BITARTRATE 5 ML: 20; .005 INJECTION, SOLUTION EPIDURAL; INFILTRATION; INTRACAUDAL; PERINEURAL at 04:43

## 2023-03-25 RX ADMIN — Medication 340 ML/HR: at 05:13

## 2023-03-25 RX ADMIN — AZITHROMYCIN MONOHYDRATE 500 MG: 500 INJECTION, POWDER, LYOPHILIZED, FOR SOLUTION INTRAVENOUS at 04:45

## 2023-03-25 RX ADMIN — SODIUM CHLORIDE, POTASSIUM CHLORIDE, SODIUM LACTATE AND CALCIUM CHLORIDE: 600; 310; 30; 20 INJECTION, SOLUTION INTRAVENOUS at 05:18

## 2023-03-25 RX ADMIN — ACETAMINOPHEN 975 MG: 325 TABLET, FILM COATED ORAL at 11:45

## 2023-03-25 RX ADMIN — LIDOCAINE HYDROCHLORIDE,EPINEPHRINE BITARTRATE 5 ML: 20; .005 INJECTION, SOLUTION EPIDURAL; INFILTRATION; INTRACAUDAL; PERINEURAL at 04:49

## 2023-03-25 RX ADMIN — MORPHINE SULFATE 2 MG: 1 INJECTION, SOLUTION EPIDURAL; INTRATHECAL; INTRAVENOUS at 05:13

## 2023-03-25 RX ADMIN — SENNOSIDES AND DOCUSATE SODIUM 1 TABLET: 50; 8.6 TABLET ORAL at 21:14

## 2023-03-25 RX ADMIN — ONDANSETRON 4 MG: 2 INJECTION INTRAMUSCULAR; INTRAVENOUS at 04:39

## 2023-03-25 RX ADMIN — SODIUM CHLORIDE, SODIUM LACTATE, POTASSIUM CHLORIDE, CALCIUM CHLORIDE AND DEXTROSE MONOHYDRATE: 5; 600; 310; 30; 20 INJECTION, SOLUTION INTRAVENOUS at 18:32

## 2023-03-25 RX ADMIN — CEFAZOLIN SODIUM 2 G: 2 INJECTION, SOLUTION INTRAVENOUS at 04:43

## 2023-03-25 RX ADMIN — LIDOCAINE HYDROCHLORIDE,EPINEPHRINE BITARTRATE 5 ML: 20; .005 INJECTION, SOLUTION EPIDURAL; INFILTRATION; INTRACAUDAL; PERINEURAL at 04:55

## 2023-03-25 RX ADMIN — SODIUM CHLORIDE, SODIUM LACTATE, POTASSIUM CHLORIDE, CALCIUM CHLORIDE AND DEXTROSE MONOHYDRATE: 5; 600; 310; 30; 20 INJECTION, SOLUTION INTRAVENOUS at 08:51

## 2023-03-25 RX ADMIN — KETOROLAC TROMETHAMINE 30 MG: 30 INJECTION, SOLUTION INTRAMUSCULAR at 17:46

## 2023-03-25 RX ADMIN — ACETAMINOPHEN 975 MG: 325 TABLET, FILM COATED ORAL at 23:43

## 2023-03-25 RX ADMIN — KETOROLAC TROMETHAMINE 30 MG: 30 INJECTION, SOLUTION INTRAMUSCULAR at 05:35

## 2023-03-25 RX ADMIN — LIDOCAINE HYDROCHLORIDE,EPINEPHRINE BITARTRATE 5 ML: 20; .005 INJECTION, SOLUTION EPIDURAL; INFILTRATION; INTRACAUDAL; PERINEURAL at 04:38

## 2023-03-25 ASSESSMENT — ACTIVITIES OF DAILY LIVING (ADL)
ADLS_ACUITY_SCORE: 18
ADLS_ACUITY_SCORE: 19
ADLS_ACUITY_SCORE: 18
ADLS_ACUITY_SCORE: 19
ADLS_ACUITY_SCORE: 19
ADLS_ACUITY_SCORE: 22
ADLS_ACUITY_SCORE: 19
ADLS_ACUITY_SCORE: 22
ADLS_ACUITY_SCORE: 18

## 2023-03-25 NOTE — PROGRESS NOTES
Labor Progress Note    Nory Woods      MRN: [id number]   Age: 32 year old YOB: 1990   Date of Admission: 3/23/2023    Subjective:  Feels well. Pushing for 3.5 hours. Ready to be done.     Objective:  Vitals:    23 0133 23 0203 23 0233 23 0238   BP:    111/53   BP Location:       Patient Position:       Pulse:       Resp:    18   Temp:    97.9  F (36.6  C)   TempSrc:    Temporal   SpO2: 97% 96% 96%        Gen: Well-appearing, NAD  Pulm: Breathing comfortably on room   Abd: Soft, gravid, non-tender  Ext: Trace LE edema b/l    FHT: BL 140bpm, moderate variability, accels, no decels  [place]: Contractions q 3 minutes      Assessment/Plan:  Ms. Nory Woods is a 32 year old  at 41w2d admitted for late term IOL.    Induction of Labor  Patient is complete and pushing with excellent maternal effort for 3.5 hours.  Fetal station is +1-+2. Significant caput. Likely fetus. Discussed after 4 hours of pushing, would expect her to be delivered, or very close to delivery, and based on fetal position, would not anticipate delivery at the 4 hour elma. Discussed proceeding with CS. Patient is amenable.     We discussed risks of infection, bleeding, injury, post-operative pain, and risks of repeat  section in subsequent pregnancies. Discussed providing antibiotics during CS to attempt to prevent infection, but that infection may still occur after delivery in the uterus, abdomen, or layers of the skin. Did have infection with D&C. Will monitor closely.     Discussed higher risk of bleeding with CS and possibility of needing emergency blood transfusion if excessive life threatening bleeding is noted. Discussed risk of injury to surrounding organs including fallopian tubes, ovaries, uterus itself, bladder, bowel, blood vessels, and nerves. Discussed repair intraoperatively of any apparent damage, and discussed possibility of damage that is not apparent until after surgery  requiring repeat surgery for repair of damage. Specifically, discussed risks to mom and baby as it relates to arrest of descent CS. Reviewed risk of uterine extension, risk of injury to ureter, and risks to baby of difficulty extraction.     We discussed post-operative pain management. We discussed the risk of adhesion formation and a higher risk CS if she were to plan to have repeat  section in the future. Reviewed option of TOLAC if she desires, but due to nature of this CS, many women would choose a repeat CS for a future delivery. Signed informed consent was obtained.      FWB:  - Category I, reactive    January Lorenzo MD, S  OB/GYN  3/25/2023

## 2023-03-25 NOTE — DISCHARGE SUMMARY
DELIVERY DISCHARGE SUMMARY    Admit date: 3/23/2023  Discharge date: 23  Discharge physician: Dr. Iraheta    Admit Dx:   - 32 year old year old  @ 41w2d GA  - Hypothyroidsim    Discharge Dx:  - Same as above, s/p PLTCS  - Arrest of descent  - Acute blood loss anemia    Procedures:  - Epidural anesthesia  - PLTCS via Pfannenstiel Incision     Hospital course:  Nory Woods was admitted as a  at 41w2d for late term IOL.   Her labor course was notable for arrest of descent after 3.5h of pushing.  Delivery was unremarkable.  Please see her admission H&P and Delivery Summary for full details regarding her admission and delivery.    Her postoperative course was complicated by ABLA. On POD#3, she was meeting all of her postpartum goals and deemed stable for discharge. She was voiding without difficulty, tolerating a regular diet without nausea and vomiting, her pain was well controlled on oral pain medicines and her lochia was appropriate. Her hemoglobin prior to delivery was 14.2 and after delivery was 11.5. Her Rh status was positive and RHOgam was not indicated. At the time of discharge, she was breast feeding her infant.      Discharge/Disposition:  Nory Woods was discharged to home in stable condition with the following instructions/medications:  1) Call for temperature > 100.4, foul smelling vaginal discharge, bleeding > 1 pad per hour x 2 hrs, pain not controlled by oral pain meds, severe constipation or severe nausea or vomiting.  2) She was instructed to follow-up with her primary OB in 6 weeks for a routine postpartum visit   3) She was instructed to continue her PNV on discharge  4) She was discharged home with the following medications:      Review of your medicines      START taking      Dose / Directions   ibuprofen 800 MG tablet  Commonly known as: ADVIL/MOTRIN  Used for:  delivery delivered      Dose: 800 mg  Take 1 tablet (800 mg) by mouth every 6 hours as  needed for other (cramping)  Quantity: 40 tablet  Refills: 0     oxyCODONE 5 MG tablet  Commonly known as: ROXICODONE  Used for:  delivery delivered      Dose: 5 mg  Take 1 tablet (5 mg) by mouth every 4 hours as needed for moderate pain (4-6)  Quantity: 10 tablet  Refills: 0     polyethylene glycol 17 GM/Dose powder  Commonly known as: MIRALAX  Used for:  delivery delivered      Dose: 17 g  Take 17 g by mouth daily  Quantity: 510 g  Refills: 0        CONTINUE these medicines which have NOT CHANGED      Dose / Directions   levothyroxine 25 MCG tablet  Commonly known as: SYNTHROID/LEVOTHROID  Used for: Hypothyroid in pregnancy, antepartum, first trimester      Dose: 25 mcg  Take 1 tablet (25 mcg) by mouth daily  Quantity: 90 tablet  Refills: 1     prenatal multivitamin w/iron 27-0.8 MG tablet      Dose: 1 tablet  Take 1 tablet by mouth daily  Refills: 0        STOP taking    doxylamine 25 MG Tabs tablet  Commonly known as: UNISOM              Where to get your medicines      These medications were sent to [facility], [place] - [address] [address]  [address] [address]    Phone: [phone]     ibuprofen 800 MG tablet    oxyCODONE 5 MG tablet    polyethylene glycol 17 GM/Dose powder       January Lorenzo MD, S  23

## 2023-03-25 NOTE — ANESTHESIA POSTPROCEDURE EVALUATION
Patient: Nory Woods    Procedure: Procedure(s):   section       Anesthesia Type:  Epidural    Note:  Disposition: Inpatient   Postop Pain Control: Uneventful            Sign Out: Well controlled pain   PONV: No   Neuro/Psych: Uneventful            Sign Out: Acceptable/Baseline neuro status   Airway/Respiratory: Uneventful            Sign Out: Acceptable/Baseline resp. status   CV/Hemodynamics: Uneventful            Sign Out: Acceptable CV status; No obvious hypovolemia; No obvious fluid overload   Other NRE:    DID A NON-ROUTINE EVENT OCCUR?      Epidural-to- Updated ASA: 2      Last vitals:  Vitals Value Taken Time   /73 23 1723   Temp 36.6  C (97.8  F) 23 1723   Pulse 84 23 1723   Resp 17 23 1723   SpO2 99 % 23 1723       Electronically Signed By: Stacy Ramsey  2023  5:52 PM

## 2023-03-25 NOTE — OP NOTE
[facility]  Operative Note     Surgery Date:  3/25/2023    Surgeon:  January Lorenzo MD, [facility]    Pre-op Diagnosis:  1. Intrauterine pregnancy at 41w2d     2. Arrest of descent     Post-op Diagnosis:  1. Same      2. Liveborn male infant     Procedure:  Primary low-transverse  section via Pfannenstiel skin incision    Anesthesia: Spinal    Delivery QBL (mL): 365mL    Drains: Morley Catheter     Specimens:  Cord blood    Complications: None apparent    Indications:   Nory Woods is a 32 year old  at 41w2d admitted for late term induction of labor. She progressed to complete cervical dilation and after 3.5 hours of pushing was found to have minimal change in fetal station.  delivery was discussed.  The risks, benefits, and alternatives of  section were discussed with the patient, and she agreed to proceed.     Findings:   1. Clear amniotic fluid  2. Liveborn male infant in cephalic direct OP presentation on 23  At 0511. Apgars 8 at 1 minute & 9 at 5 minutes. Weight 8ycn1co.  3. Normal uterus, fallopian tubes, and ovaries. No adhesions. Extension of hysterotomy to right broad ligament. Bloody urine noted after hysterotomy. Thorough examination of bladder revealed no involvement of extension into bladder. After observation, urinary morley drained clear yellow urine.     Procedure Details:   The patient was brought to the OR, where adequate epidural anesthesia was identified.  She was placed in the dorsal lithotomy position with a slight leftward tilt, to facilitate fetal extraction. A Fetal Pillow device was placed in the vagina and inflated with 180cc of fluid. She was then prepped and draped in the usual sterile fashion. IV Ancef and azithromycin were administered.  A surgical time out was performed. A Pfannenstiel skin incision was made with the scalpel, and carried down to the underlying fascia with sharp and blunt dissection. The fascia was incised in the  midline, and the incision was extended laterally, superiorly, and inferiorly using blunt traction, off of the underlying rectus. The rectus muscles were  in the midline, and the peritoneum was entered bluntly, and the opening was extended with digital pressure. The bladder blade was placed.  A transverse hysterotomy was made with the scalpel in the lower uterine segment, and the incision was extended with digital pressure. The infant was noted to be in the cephalic direct OP position, and was delivered atraumatically. The shoulders delivered with some difficulty but atrumatically  No nuchal cord was noted. The cord was doubly clamped and cut after 50 seconds, and the infant was handed off to the awaiting NICU  staff. A segment of cord was cut and saved. The placenta was delivered with gentle traction on the umbilical cord and uterine massage. The uterus was exteriorized and cleared of all clots and debris. Uterine tone was noted to be firm.  The hysterotomy was closed with a running locked suture of 0 Vicryl.  Bleeding noted at left aspect of hysterotomy which was controlled with running locked 0- Monocryl. The hysterotomy was noted to be hemostatic. Attention was turned to careful inspection of bladder serosa and surrounding tissue after surgeon was notified of blood tinged urine. See findings above. The posterior cul-de-sac was and cleared of all clots and debris. The uterus was returned to the abdomen. The pericolic gutters were cleared of all clots and debris. The hysterotomy was reexamined and noted to be hemostatic. The fascia and rectus muscles were examined and areas of oozing were controlled with electrocautery. The fascia was closed with a running 0 Vicryl suture. The subcutaneous tissue was irrigated and areas of oozing were controlled with electrocautery. The skin was closed with 4-0 monocryl and covered with a sterile dressing.    All sponge, needle, and instrument counts were correct. The  patient tolerated the procedure well, and was transferred to recovery in stable condition.     January Lorenzo MD  OB/GYN  3/25/2023

## 2023-03-25 NOTE — PLAN OF CARE
Vital signs stable. Postpartum assessment WDL. Dressing clean, dry, intact. Pain controlled with duramorph, tylenol and toradol. Patient ambulating with sba. Patient denies passing gas. Breastfeeding on cue with  assist. Patient and infant bonding well. Will continue with current plan of care.

## 2023-03-25 NOTE — PLAN OF CARE
Data: Nory Woods transferred to [facility] via bed at 0800. Baby transferred via parent's arms.  Action: Receiving unit notified of transfer: Yes. Patient and family notified of room change. Report given to LINDY Perez RN at 0805. Belongings sent to receiving unit. Accompanied by Registered Nurse. Oriented patient to surroundings. Call light within reach. ID bands double-checked with receiving RN.  Response: Patient tolerated transfer and is stable.

## 2023-03-25 NOTE — PROVIDER NOTIFICATION
03/24/23 2120   Provider Notification   Provider Name/Title dr hidalgo   Method of Notification Phone   Request Evaluate - Remote   Notification Reason Decels;Uterine Activity;SVE;Status Update     Dr hidalgo uopdated on SVE, contraction pattern, pitocin increase and then decrease; bolus given; position changes; variable and late decels. Orders received to place an IUPC.

## 2023-03-25 NOTE — ANESTHESIA CARE TRANSFER NOTE
Patient: Nory Woods    Procedure: Procedure(s):   section       Diagnosis: Indication for care in labor or delivery [O75.9]  Diagnosis Additional Information: No value filed.    Anesthesia Type:   Epidural     Note:    Oropharynx: oropharynx clear of all foreign objects  Level of Consciousness: awake  Oxygen Supplementation: room air    Independent Airway: airway patency satisfactory and stable  Dentition: dentition unchanged  Vital Signs Stable: post-procedure vital signs reviewed and stable  Report to RN Given: handoff report given  Patient transferred to: Labor and Delivery  Comments: Transferred to OB PACU recovery, spontaneous respirations on room air with oxygen saturations maintained greater than 98%. SpO2, NiBP, and EKG monitors and alarms on and functioning, report on patient's clinical status given to OB recovery RN, RN questions answered, patient in hospital cart with siderails up Oxytocin 30 units in 500mL infusion connected to IV infusion pump in recovery bay and programmed to 100 mL/hr at handoff of care.    Handoff Report: Identifed the Patient, Identified the Reponsible Provider, Reviewed the pertinent medical history, Discussed the surgical course, Reviewed Intra-OP anesthesia mangement and issues during anesthesia, Set expectations for post-procedure period and Allowed opportunity for questions and acknowledgement of understanding      Vitals:  Vitals Value Taken Time   BP     Temp     Pulse     Resp 112 23 0555   SpO2 95 % 23 0555   Vitals shown include unvalidated device data.    Electronically Signed By: Christine Marie Volp Hodgkins, CRNA, APRN CRNA  2023  5:55 AM

## 2023-03-26 LAB — HGB BLD-MCNC: 11.5 G/DL (ref 11.7–15.7)

## 2023-03-26 PROCEDURE — 250N000013 HC RX MED GY IP 250 OP 250 PS 637: Performed by: STUDENT IN AN ORGANIZED HEALTH CARE EDUCATION/TRAINING PROGRAM

## 2023-03-26 PROCEDURE — 120N000012 HC R&B POSTPARTUM

## 2023-03-26 PROCEDURE — 85018 HEMOGLOBIN: CPT | Performed by: STUDENT IN AN ORGANIZED HEALTH CARE EDUCATION/TRAINING PROGRAM

## 2023-03-26 PROCEDURE — 36415 COLL VENOUS BLD VENIPUNCTURE: CPT | Performed by: STUDENT IN AN ORGANIZED HEALTH CARE EDUCATION/TRAINING PROGRAM

## 2023-03-26 RX ADMIN — IBUPROFEN 800 MG: 400 TABLET ORAL at 18:37

## 2023-03-26 RX ADMIN — SENNOSIDES AND DOCUSATE SODIUM 1 TABLET: 50; 8.6 TABLET ORAL at 08:11

## 2023-03-26 RX ADMIN — ACETAMINOPHEN 975 MG: 325 TABLET, FILM COATED ORAL at 18:36

## 2023-03-26 RX ADMIN — LEVOTHYROXINE SODIUM 25 MCG: 25 TABLET ORAL at 08:11

## 2023-03-26 RX ADMIN — IBUPROFEN 800 MG: 400 TABLET ORAL at 12:42

## 2023-03-26 RX ADMIN — IBUPROFEN 800 MG: 400 TABLET ORAL at 06:22

## 2023-03-26 RX ADMIN — ACETAMINOPHEN 975 MG: 325 TABLET, FILM COATED ORAL at 06:22

## 2023-03-26 RX ADMIN — ACETAMINOPHEN 975 MG: 325 TABLET, FILM COATED ORAL at 12:41

## 2023-03-26 RX ADMIN — OXYCODONE HYDROCHLORIDE 5 MG: 5 TABLET ORAL at 15:18

## 2023-03-26 RX ADMIN — SENNOSIDES AND DOCUSATE SODIUM 2 TABLET: 50; 8.6 TABLET ORAL at 20:19

## 2023-03-26 RX ADMIN — OXYCODONE HYDROCHLORIDE 5 MG: 5 TABLET ORAL at 23:21

## 2023-03-26 ASSESSMENT — ACTIVITIES OF DAILY LIVING (ADL)
ADLS_ACUITY_SCORE: 21
ADLS_ACUITY_SCORE: 19
ADLS_ACUITY_SCORE: 19
ADLS_ACUITY_SCORE: 18
ADLS_ACUITY_SCORE: 19
ADLS_ACUITY_SCORE: 21
ADLS_ACUITY_SCORE: 19
ADLS_ACUITY_SCORE: 18
ADLS_ACUITY_SCORE: 19
ADLS_ACUITY_SCORE: 18

## 2023-03-26 NOTE — PLAN OF CARE
Vital signs stable. Voiding without difficulty. Lung sounds clear and equal. Using Tylenol/Ibuprofen for pain management. Up ambulating free of dizziness. Working on breastfeeding every 2-3 hours. Encouraged to call with questions/concerns. Will continue to monitor.

## 2023-03-26 NOTE — PLAN OF CARE
Vital signs stable. Postpartum assessment WDL. Incision with dressing CDI. Pain controlled with Tylenol and Toradol or Ibuprofen. Patient ambulating independently. Ricardo with adequate output removed at 0050. Post void at 0450 for 170ml. Patient reports passing gas. Breastfeeding on cue with minimal assist. Patient and infant bonding well. Will continue with current plan of care.

## 2023-03-27 PROCEDURE — 250N000013 HC RX MED GY IP 250 OP 250 PS 637: Performed by: STUDENT IN AN ORGANIZED HEALTH CARE EDUCATION/TRAINING PROGRAM

## 2023-03-27 PROCEDURE — 120N000012 HC R&B POSTPARTUM

## 2023-03-27 RX ORDER — POLYETHYLENE GLYCOL 3350 17 G/17G
17 POWDER, FOR SOLUTION ORAL DAILY
Status: DISCONTINUED | OUTPATIENT
Start: 2023-03-27 | End: 2023-03-28 | Stop reason: HOSPADM

## 2023-03-27 RX ADMIN — OXYCODONE HYDROCHLORIDE 5 MG: 5 TABLET ORAL at 09:59

## 2023-03-27 RX ADMIN — SENNOSIDES AND DOCUSATE SODIUM 1 TABLET: 50; 8.6 TABLET ORAL at 21:20

## 2023-03-27 RX ADMIN — ACETAMINOPHEN 975 MG: 325 TABLET, FILM COATED ORAL at 21:19

## 2023-03-27 RX ADMIN — ACETAMINOPHEN 975 MG: 325 TABLET, FILM COATED ORAL at 02:00

## 2023-03-27 RX ADMIN — ACETAMINOPHEN 975 MG: 325 TABLET, FILM COATED ORAL at 08:04

## 2023-03-27 RX ADMIN — SENNOSIDES AND DOCUSATE SODIUM 1 TABLET: 50; 8.6 TABLET ORAL at 08:05

## 2023-03-27 RX ADMIN — LEVOTHYROXINE SODIUM 25 MCG: 25 TABLET ORAL at 08:04

## 2023-03-27 RX ADMIN — ACETAMINOPHEN 975 MG: 325 TABLET, FILM COATED ORAL at 13:55

## 2023-03-27 RX ADMIN — IBUPROFEN 800 MG: 400 TABLET ORAL at 21:20

## 2023-03-27 RX ADMIN — IBUPROFEN 800 MG: 400 TABLET ORAL at 13:55

## 2023-03-27 RX ADMIN — IBUPROFEN 800 MG: 400 TABLET ORAL at 08:04

## 2023-03-27 RX ADMIN — IBUPROFEN 800 MG: 400 TABLET ORAL at 02:01

## 2023-03-27 RX ADMIN — OXYCODONE HYDROCHLORIDE 5 MG: 5 TABLET ORAL at 18:48

## 2023-03-27 ASSESSMENT — ACTIVITIES OF DAILY LIVING (ADL)
ADLS_ACUITY_SCORE: 22
ADLS_ACUITY_SCORE: 18
ADLS_ACUITY_SCORE: 22
ADLS_ACUITY_SCORE: 18
ADLS_ACUITY_SCORE: 22

## 2023-03-27 NOTE — PLAN OF CARE
Vital signs are stable. Voiding without difficulty. Lung sounds clear and equal. Using Tylenol, ibuprofen, and oxycodone for pain management. Up ambulating free of dizziness. Working on breastfeeding every 2-3 hours. Encouraged to call with questions/concerns. Will continue to monitor.

## 2023-03-27 NOTE — PLAN OF CARE
Pt's pain controlled with Ibuprofen and Tylenol and an occasional Oxycodone. Abdominal binder on. Up and about in room and walking in the hallways. Will continue to monitor.

## 2023-03-27 NOTE — PROGRESS NOTES
S: Pt doing well. Infant is being . Pain is controlled with current analgesics.  Medication(s) being used: acetaminophen, ibuprofen (OTC) and narcotic analgesics including oxycodone. + flatus, no BM, appetite improving. Voiding w/o difficulty    O: /78   Pulse 62   Temp 98.3  F (36.8  C) (Oral)   Resp 18   LMP 06/09/2022   SpO2 96%   Breastfeeding Unknown         ABD:  Uterine fundus is firm, non-tender and at the level of the umbilicus       INC: clean/dry/intact                Hemoglobin   Date Value Ref Range Status   03/26/2023 11.5 (L) 11.7 - 15.7 g/dL Final          No results found for: RH    A: Post-op Day #2 s/p C/Section    P: Continue Post Op Cares    Anticipated d/c tomorrow

## 2023-03-27 NOTE — LACTATION NOTE
Initial visit with Mother and Father and baby boy.  Mother states breast feeding seems to be going well.    Baby boy due for a feeding at time of visit.  LC reviewed with Mother proper positioning of baby, maternal hand placement, using breast feeding support pillows, and how to help baby achieve a deep latch with feedings.  Reviewed importance of getting a deep latch with feedings versus a shallow latch.  LC assisted mother to get baby latched onto left breast in the football position.  Good latch noted with strong, continuous suckle pattern.   Baby boy tolerates feeding well.        Mother and Father educated on normal  behavior, focusing on normal feeding patterns from birth to day 3 of life. Reviewed early milk volumes, hand expression, and how to know baby is getting enough by recording feedings and wet/dirty diapers. Reassured parents that we will monitor infant's weight at 24 hours every night during their stay.  LC educated parents on monitoring for early feeding cues, feeding on demand at least 8-12 times in a 24 hour period, and techniques to waking a sleepy baby to breast feed.     Reviewed with Mother and Father the breastfeeding section in admission booklet.  LC encouraged parents to record infant feedings, voids, and stools in a feeding log. Encouraged rooming in and lots of skin to skin.    LC reviewed supplementing, pumping, bottle feeding, frequency and length of feedings, and other general breast feeding information.    Advised to breastfeed exclusively, on demand, avoid pacifiers, bottles and formula unless medically indicated.    Encouraged Mother to call for assistance with latch or positioning if needed.    Appreciative of visit.  No further questions at this time. Will follow as needed.     Nelida Teixeira RN, IBCLC

## 2023-03-27 NOTE — PLAN OF CARE
Vital signs stable. Postpartum assessment WDL. Incision with steri-strips CDI. Pain controlled with Tylenol and Ibuprofen. Patient ambulating independently. Patient reports passing gas. Breastfeeding on cue with minimal assist. Patient and infant bonding well. Will continue with current plan of care.

## 2023-03-28 VITALS
TEMPERATURE: 97.6 F | HEART RATE: 62 BPM | OXYGEN SATURATION: 96 % | RESPIRATION RATE: 18 BRPM | DIASTOLIC BLOOD PRESSURE: 70 MMHG | SYSTOLIC BLOOD PRESSURE: 113 MMHG

## 2023-03-28 PROCEDURE — 250N000013 HC RX MED GY IP 250 OP 250 PS 637: Performed by: STUDENT IN AN ORGANIZED HEALTH CARE EDUCATION/TRAINING PROGRAM

## 2023-03-28 RX ORDER — POLYETHYLENE GLYCOL 3350 17 G/17G
17 POWDER, FOR SOLUTION ORAL DAILY
Qty: 510 G | Refills: 0 | Status: SHIPPED | OUTPATIENT
Start: 2023-03-28 | End: 2023-05-10

## 2023-03-28 RX ORDER — OXYCODONE HYDROCHLORIDE 5 MG/1
5 TABLET ORAL EVERY 4 HOURS PRN
Qty: 10 TABLET | Refills: 0 | Status: SHIPPED | OUTPATIENT
Start: 2023-03-28 | End: 2023-05-10

## 2023-03-28 RX ORDER — IBUPROFEN 800 MG/1
800 TABLET, FILM COATED ORAL EVERY 6 HOURS PRN
Qty: 40 TABLET | Refills: 0 | Status: SHIPPED | OUTPATIENT
Start: 2023-03-28 | End: 2023-05-10

## 2023-03-28 RX ADMIN — LEVOTHYROXINE SODIUM 25 MCG: 25 TABLET ORAL at 07:05

## 2023-03-28 RX ADMIN — OXYCODONE HYDROCHLORIDE 5 MG: 5 TABLET ORAL at 03:26

## 2023-03-28 RX ADMIN — IBUPROFEN 800 MG: 400 TABLET ORAL at 07:05

## 2023-03-28 RX ADMIN — ACETAMINOPHEN 975 MG: 325 TABLET, FILM COATED ORAL at 03:26

## 2023-03-28 RX ADMIN — SENNOSIDES AND DOCUSATE SODIUM 1 TABLET: 50; 8.6 TABLET ORAL at 07:05

## 2023-03-28 RX ADMIN — ACETAMINOPHEN 650 MG: 325 TABLET ORAL at 09:26

## 2023-03-28 ASSESSMENT — ACTIVITIES OF DAILY LIVING (ADL)
ADLS_ACUITY_SCORE: 18

## 2023-03-28 NOTE — PLAN OF CARE
Vitally stable. Independent in room. Breastfeeding well.Bonding well with baby. Pain managed with oral meds.

## 2023-03-28 NOTE — LACTATION NOTE
"Lactation visit with Nory, GABRIEL, and baby boy Mohamud.    Mohamud was nursing at time of visit. Nory looks very comfortable holding infant in football hold on L breast. Nory states her breasts are more full and heavy today, infant gulping at the breast. Answered questions regarding \"how to know when infant is done at the breast\". Educated to infant satiety signs; encouraged listening for audible swallows along with watching for changes in infant's stool color. Discussed normal infant weight loss and when infant should be back to birth weight. Stressed the importance of continuing to track infant's feeds and void/stools patterns, at least until infant has returned to his birth weight.      Reviewed breast feeding section in our \"Guide to Postpartum and  Care.\"      Discussed pumping (when it's helpful, when it's necessary, and when to start). Suggested pumping around infant's one month of age after first morning breast-feed for building milk storage). Educated on products to help with passive milk collection (ie: Haakaa) and when to offer a bottle. Nory has a new breast pump for home use.     Suggested \"Guide to Postpartum and  Care\" handbook is a great resource going forward for topics that include engorgement, plugged milk ducts, mastitis, safe sleep, and safety of baby.     Feeding plan recommendations: provide unlimited, on-demand breast feedings: At least 8-12 times/24 hours (reviewed early feeding cues). Suggested pumping if baby has a poor feeding or if supplementation is necessary. Encouraged on-going use of a feeding log or saman to record feedings along with void/stool patterns. Avoid pacifiers (until 1 month of age per AAP guidelines) and supplementation with formula unless medically indicated. Follow up with Pediatrician as requested and encouraged lactation follow up. Reviewed [facility] outpatient lactation resources. Appreciative of visit.    Nicolette Arechiga RN, IBCLC            "

## 2023-03-28 NOTE — PLAN OF CARE
Pt's pain controlled with Ibuprofen and Tylenol and an occasional Oxycodone. Up and about in room and walking in the hallways. Abdominal binder on. Going home today with .

## 2023-03-28 NOTE — PLAN OF CARE
VSS. Fundus firm and midline. Scant flow. Pain 5/10, pain controled with tylenol, ibuprofen, and oxycodone per MAR. Incision open to air. Breastfeeding. Ambulating free of dizziness or lightheaded. Voiding without difficulty. Encouraged to call with needs, questions, or concerns.

## 2023-03-28 NOTE — PROGRESS NOTES
S: Pt doing well. Infant is being  and going well and feels like her milk is in already.  Pain is controlled with current analgesics.  Medication(s) being used: acetaminophen, ibuprofen (OTC) and narcotic analgesics including oxycodone but only every 10 or more hours apart. No BM yet. Using only colace b/c was nervous to start miralax yet until getting home. +flatus.    O: /70   Pulse 62   Temp 97.6  F (36.4  C) (Oral)   Resp 18   LMP 06/09/2022   SpO2 96%   Breastfeeding Unknown         ABD:  Uterine fundus is firm, non-tender and at the level of the umbilicus       INC: clean/dry/intact                Hemoglobin   Date Value Ref Range Status   03/26/2023 11.5 (L) 11.7 - 15.7 g/dL Final          No results found for: RH    A: Post-op Day #3 s/p C/Section    P: Continue Post Op Cares    Reviewed PP depression and anxiety and blues. What to look for and what to call/come in for outside of normal hormonal changes, etc    Reviewed d/c instructions/restrictions    F/up in 6 weeks as scheduled.

## 2023-03-30 ENCOUNTER — TELEPHONE (OUTPATIENT)
Dept: OBGYN | Facility: CLINIC | Age: 33
End: 2023-03-30
Payer: COMMERCIAL

## 2023-03-30 NOTE — TELEPHONE ENCOUNTER
C/S 3/25/23   Overall is feeling well.    One of her nurses in the hospital before she went home was telling her about her experience with post partum preE   Felt nervous about this today  Checked her blood pressure at home 125/90    Discussed in detail s/s that would warrant evaluation (post-op concerns and preE)  Could also come in tomorrow for nurse BP check-I do not think this is necessary however can come anytime tomorrow for reassurance  Also discussed if she would to check it at home rest for 10 mins, feet on the floor, arm at heart level.    Another concern is that her mother felt she looked a little pale. Discussed recent hgb level (11.5) and s/s that would warrant a recheck of these labs.     Questions answered, pt reassured.  Kim Clinton RN on 3/30/2023 at 3:34 PM

## 2023-04-19 ENCOUNTER — MYC MEDICAL ADVICE (OUTPATIENT)
Dept: OBGYN | Facility: CLINIC | Age: 33
End: 2023-04-19
Payer: COMMERCIAL

## 2023-04-20 NOTE — TELEPHONE ENCOUNTER
"3/25/23 C/S delivery w Maura Iraheta pt    3/30/23    Still vaginal bleeding - bright dark red, passing small clots usually when using the BR, never seen on pads.  Has never stopped or changed colors - maybe slowed a little. Never saturated pads.  Changing her pads every 6 hours.    Not a ton of activity at home post op and staying a lot of time in bed pt reports. Now that  back to work, she is up and down more and providing more consistent baby cares.    Denies fever, abdominal tenderness or incision site concerns.  Still has steri-strips and encouraged pt to remove them today in shower. Incision clean w/o sx of infection per pt.    Pt o/w feels \"great\" and no other concerns. Baby gaining   weight.    Discussed bleeding all the way to 6 wks and it isn't heavy, she wasn't moving around a lot and now picking up activity and discussed bleeding and also when to be concerned: heavy bleeding, fever, abdominal tenderness and sx of infection.    Pt feeling reassured and has her 6 wk visit scheduled and will f/u then.    Pt verbalized understanding, in agreement with plan, and voiced no further questions.  Susan Morocho RN on 4/20/2023 at 9:35 AM            "

## 2023-04-22 ENCOUNTER — HEALTH MAINTENANCE LETTER (OUTPATIENT)
Age: 33
End: 2023-04-22

## 2023-04-29 ENCOUNTER — NURSE TRIAGE (OUTPATIENT)
Dept: NURSING | Facility: CLINIC | Age: 33
End: 2023-04-29
Payer: COMMERCIAL

## 2023-04-29 NOTE — TELEPHONE ENCOUNTER
Provider Recommendation Follow Up:   Reached patient/caregiver. Informed of provider's recommendations. Patient verbalized understanding and agrees with the plan.         Emelyn Rossi RN/[facility] Nurse Advisor

## 2023-04-29 NOTE — TELEPHONE ENCOUNTER
Nurse Triage SBAR    Is this a 2nd Level Triage? YES, LICENSED PRACTITIONER REVIEW IS REQUIRED    Situation: right sided abdominal incision pain/tenderness    Background:   5 weeks postpartum,  on 3/25  Pt thinks she may have overdone it on Thu , had short walks, hair done.  Pain started yesterday, Friday.    Assessment:   Constant pain on right abdomen, 1 inch below and 1 inch above the incision. Area feels tender rated 5/10. Pain feels it is coming from the muscle and superficial.  Hard to make a quick movement, struggling to get out of bed easily.  Pt thinks incision on right side appears slightly puffy.    No redness/discoloration, no streaking in or near incision site. No wound drainage. No rash. No bleeding.   No nausea, no vomiting  Temperature 97.8F    Pt has taken ibuprofen which has slightly helped. Last took it at 9:30 AM.  Pt has also iced the area    Protocol Recommended Disposition:   Call PCP Within 24 Hours    Recommendation:   If pain becomes severe, go to ED.  If wound starts to appear infected, head to UC.    Paged to provider   RN paged on call provider for MD Lars via smart web at 11:50 am.  Recommendations:  Reassure pt that  incision is well healed up. Pt may have strained her abdomen on Thu.  - may add Tylenol 1000 mg q6h (max 3000 mg/24 hours). Give 3 hour interval after ibuprofen  - Heating pad may be more soothing instead of cold packs  - light activities, take it easy over the weekend.  Call back for further concerns      Does the patient meet one of the following criteria for ADS visit consideration? 16+ years old, with an [facility] PCP     TIP  Providers, please consider if this condition is appropriate for management at one of our Acute and Diagnostic Services sites.     If patient is a good candidate, please use dotphrase <dot>triageresponse and select Refer to ADS to document.    Emelyn Rossi RN/[facility] Nurse Advisor      Reason for Disposition    [1] MILD  to MODERATE post-op pain (e.g., pain scale 1-7) AND [2]  not relieved with pain medication    Additional Information    Negative: [1] Wound gaping open AND [2] visible internal organs    Negative: Sounds like a life-threatening emergency to the triager    Negative: [1] Bleeding from incision AND [2] won't stop after 10 minutes of direct pressure (using correct technique)    Negative: [1] Widespread rash AND [2] bright red, sunburn-like    Negative: Fever > 100.4 F (38.0 C)    Negative: Severe pain in the incision    Negative: [1] Incision gaping open AND [2] < 48 hours since wound re-opened    Negative: [1] Incision gaping open AND [2] length of opening > 2 inches (5 cm)    Negative: Patient sounds very sick or weak to the triager    Negative: [1] Incision looks infected (spreading redness, pain) AND [2] fever > 99.5 F (37.5 C)    Negative: [1] Incision looks infected (spreading redness, pain) AND [2] large red area (> 2 in. or 5 cm)    Negative: [1] Pus or bad-smelling fluid draining from incision AND [2] no fever    Negative: Dressing soaked with blood or body fluid (e.g., drainage)    Negative: [1] Caller has URGENT question AND [2] triager unable to answer question    Negative: Suture or staple removal is overdue    Negative: [1] Small red area or streak AND [2] no fever    Negative: [1] Clear or blood-tinged fluid draining from wound AND [2] no fever    Negative: [1] 48 hours since surgery AND [2] wound is becoming more tender    Negative: [1] Incision gaping open AND [2] > 48 hours since wound re-opened AND [3] length of opening > 1/2 inch (12 mm)    Protocols used: POSTPARTUM -  INCISION SYMPTOMS-A-AH

## 2023-05-04 NOTE — PROGRESS NOTES
SUBJECTIVE:  Nory Woods,  is here for a postpartum visit.  She had a  Section  on 2023 delivering a healthy baby boy, named Mohamud weighing 8 lbs 5 oz at term.      HPI:  Patient is doing really well. Baby has been healthy and has had no issues in terms of some of the congenital heard/aorta concerns during pregnancy and is thriving and hittimg appropriate milestones.    Incision pain is well controlled and minimal now. Lochia finished as per normal after 3 weeks or so. Had nothing until a few days ago and spotted for a day or so. Not heavy and no cramping or pain and mostly just tan discharge again.    Breastfeeding and going well.  Baby is sleeping about 4-5 hours for a first stretch and then shorter deeper in to the night but is managing    Had very mild hypothyroidism and was on 50mcg at start of preg and eventually weaned down to only 25mcg and doing that still now    Has had anxiety for years and feels like has it appropriately now. Worse if less sleep or baby fussier but doesn't feel it is out of proportion to the new stressors of a . Is having rare intrusive thoughts and worries but rare and short lived and no overwhelming or interfering anxiety at this point. No depression. Thought this would be a big potential problem for her so pleasantly surprised that doing pretty well at this point and declines meds, etc    Had PCOS as a teen but had regular cycles as an adult. Unsure on contraception at this time.    Reviewed her c/s need and that likely CPD and higher recurrence and patient herself also feels that she would opt for a repeat scheduled c/s next pregnancy vs wanting a TOLAC   Last PHQ-9 score on record=       5/10/2023    10:44 AM   PHQ-9 SCORE   PHQ-9 Total Score 0         2022     8:24 AM 2022     9:06 AM 5/10/2023    10:44 AM   DORCAS-7 SCORE   Total Score 0 (minimal anxiety) 0 (minimal anxiety)    Total Score 0 0 0       Pap: (No results found for: GYNINTERP,  "PAP )  ? due    Delivery complications:  No  Breast feeding:  Yes,   Bladder problems:  No  Bowel problems/hemorrhoids:  Yes, healed now   Episiotomy/laceration/incision healed? Yes:   Vaginal flow:  None, did have spotting on Saturday   [place]:  No  Contraception: none  Emotional adjustment:  doing well and happy  Back to work:  07/27/2023     OBJECTIVE:  Vitals: /68   Ht 1.695 m (5' 6.75\")   Wt 68 kg (150 lb)   LMP 06/09/2022   Breastfeeding Yes   BMI 23.67 kg/m    BMI= Body mass index is 23.67 kg/m .  General - pleasant female in no acute distress.  Breast -  deferred  Abdomen - Incision well-healed  Pelvic - EG: normal adult female, BUS: within normal limits, Vagina: well rugated, no discharge, Cervix: no lesions or CMT, Uterus: firm, normal sized and nontender, midplane in position. Adnexae: no masses or tenderness.  Rectovaginal - deferred.    ASSESSMENT:    ICD-10-CM    1. Routine postpartum follow-up  Z39.2 Pap thin layer screen with HPV - recommended age 30 - 65 years     HPV Hold (Lab Only)     HPV High Risk Types DNA Cervical      2. Acquired hypothyroidism  E03.9 TSH     TSH     levothyroxine (SYNTHROID/LEVOTHROID) 25 MCG tablet          PLAN:  May resume normal activities without restrictions.  Pap smear was done today.    Is unsure on route for contraception. Reviewed ocps when breastfeeding vs YAZMIN's and the transition, condoms, mirena IUD and nonhormonal IUDs, NFP but unable to use this reliably when nursing and amenorrheic.  Patient is leaning towards a mirena IUD. Given all the info and will schedule for this in the next few weeks if chooses this route  O/w will contact me if wants minipill, etc    Will check her TSH today and then either adjust, stop, or refill her levothyroxine dose  Should likely repeat again in 6 months and then at her next annual given such a low dose and frequent over correction in the immediate PP period    Full counseling was provided, and all questions were " answered.   Return to clinic in one year for an annual visit.     Radha Iraheta MD

## 2023-05-10 ENCOUNTER — PRENATAL OFFICE VISIT (OUTPATIENT)
Dept: OBGYN | Facility: CLINIC | Age: 33
End: 2023-05-10
Payer: COMMERCIAL

## 2023-05-10 VITALS
HEIGHT: 67 IN | WEIGHT: 150 LBS | DIASTOLIC BLOOD PRESSURE: 68 MMHG | SYSTOLIC BLOOD PRESSURE: 110 MMHG | BODY MASS INDEX: 23.54 KG/M2

## 2023-05-10 DIAGNOSIS — E03.9 ACQUIRED HYPOTHYROIDISM: ICD-10-CM

## 2023-05-10 LAB — TSH SERPL DL<=0.005 MIU/L-ACNC: 0.98 UIU/ML (ref 0.3–4.2)

## 2023-05-10 PROCEDURE — 36415 COLL VENOUS BLD VENIPUNCTURE: CPT | Performed by: OBSTETRICS & GYNECOLOGY

## 2023-05-10 PROCEDURE — 87624 HPV HI-RISK TYP POOLED RSLT: CPT | Performed by: OBSTETRICS & GYNECOLOGY

## 2023-05-10 PROCEDURE — G0145 SCR C/V CYTO,THINLAYER,RESCR: HCPCS | Performed by: OBSTETRICS & GYNECOLOGY

## 2023-05-10 PROCEDURE — 84443 ASSAY THYROID STIM HORMONE: CPT | Performed by: OBSTETRICS & GYNECOLOGY

## 2023-05-10 PROCEDURE — 99207 PR POST PARTUM EXAM: CPT | Performed by: OBSTETRICS & GYNECOLOGY

## 2023-05-10 ASSESSMENT — PATIENT HEALTH QUESTIONNAIRE - PHQ9
SUM OF ALL RESPONSES TO PHQ QUESTIONS 1-9: 0
5. POOR APPETITE OR OVEREATING: NOT AT ALL

## 2023-05-10 ASSESSMENT — ANXIETY QUESTIONNAIRES
1. FEELING NERVOUS, ANXIOUS, OR ON EDGE: NOT AT ALL
GAD7 TOTAL SCORE: 0
6. BECOMING EASILY ANNOYED OR IRRITABLE: NOT AT ALL
IF YOU CHECKED OFF ANY PROBLEMS ON THIS QUESTIONNAIRE, HOW DIFFICULT HAVE THESE PROBLEMS MADE IT FOR YOU TO DO YOUR WORK, TAKE CARE OF THINGS AT HOME, OR GET ALONG WITH OTHER PEOPLE: NOT DIFFICULT AT ALL
3. WORRYING TOO MUCH ABOUT DIFFERENT THINGS: NOT AT ALL
5. BEING SO RESTLESS THAT IT IS HARD TO SIT STILL: NOT AT ALL
2. NOT BEING ABLE TO STOP OR CONTROL WORRYING: NOT AT ALL
GAD7 TOTAL SCORE: 0
7. FEELING AFRAID AS IF SOMETHING AWFUL MIGHT HAPPEN: NOT AT ALL

## 2023-05-12 NOTE — RESULT ENCOUNTER NOTE
Nory,    So your TSH is just under 1.0. in theory 1-2.5 is ideal so you could even conceivably stop all of the levothyroxine and not even take the 25mcg at all. We could also just keep you on it and repeat another TSH in 5-6 months again.  Even if you stopped it, I'd probably have you repeat in 5-6 months. This immediate postpartum time when you're tired and acutely lose weight and breastfeed can commonly be a time when you don't need as much medication and then with time need it again.    I'll let you decide if you want to stay on 25mcg or just stop it and if you stay on, I'll send in refills.  Either way, I'll pend a TSH for November sometime and you can make a lab appointment to do that just to keep tabs on it.    Radha Iraheta MD

## 2023-05-15 LAB
BKR LAB AP GYN ADEQUACY: NORMAL
BKR LAB AP GYN INTERPRETATION: NORMAL
BKR LAB AP HPV REFLEX: NORMAL
BKR LAB AP PREVIOUS ABNORMAL: NORMAL
PATH REPORT.COMMENTS IMP SPEC: NORMAL
PATH REPORT.COMMENTS IMP SPEC: NORMAL
PATH REPORT.RELEVANT HX SPEC: NORMAL

## 2023-05-16 LAB
HUMAN PAPILLOMA VIRUS 16 DNA: NEGATIVE
HUMAN PAPILLOMA VIRUS 18 DNA: NEGATIVE
HUMAN PAPILLOMA VIRUS FINAL DIAGNOSIS: NORMAL
HUMAN PAPILLOMA VIRUS OTHER HR: NEGATIVE

## 2023-05-23 RX ORDER — LEVOTHYROXINE SODIUM 25 UG/1
25 TABLET ORAL DAILY
Qty: 90 TABLET | Refills: 3 | Status: SHIPPED | OUTPATIENT
Start: 2023-05-23

## 2023-08-17 ENCOUNTER — TELEPHONE (OUTPATIENT)
Dept: OBGYN | Facility: CLINIC | Age: 33
End: 2023-08-17
Payer: COMMERCIAL

## 2023-08-17 NOTE — TELEPHONE ENCOUNTER
3/25/23: Delivered  Pt calling with ear infection, was given Rx Amoxicillin  Wondering if Rx if acceptable while nursing.  Discussed safety of Amoxicillin in nursing.  Pt verbalized understanding, in agreement with plan, and voiced no further questions.  Jyothi Solis RN on 8/17/2023 at 4:35 PM

## 2023-11-07 ENCOUNTER — TELEPHONE (OUTPATIENT)
Dept: OBGYN | Facility: CLINIC | Age: 33
End: 2023-11-07

## 2023-11-07 ENCOUNTER — TELEPHONE (OUTPATIENT)
Dept: BEHAVIORAL HEALTH | Facility: CLINIC | Age: 33
End: 2023-11-07
Payer: COMMERCIAL

## 2023-11-07 NOTE — TELEPHONE ENCOUNTER
Outreach made to pt to offer [facility] appt per the request of Cori GONZALEZ. Left voicemail with number for scheduling. [facility] [employer] message also sent.    NICANOR Sweeney, [facility]  Behavioral Health Clinician  [facility]

## 2023-11-07 NOTE — TELEPHONE ENCOUNTER
3/2523: Delivered  Pt Calling with PP Depression, visit scheduled with Esequiel 11/17/23    Pt notes symptoms have started over the last 1-2 months. Does not feel like herself  Has experienced rage and tears--intense mood swings  Also notes some intrusive thoughts with self harm, nothing that she would act on.  Feels safe with child and has not intrusive thoughts with them.  Her son has not been been sleeping well for some time, but that is getting better. Reports she is sleeping more now but her symptoms have been persisting.    States she was on sertraline in the past and did well with it.  Pt willing to update DORCAS/ PHQ, will send via [employer]  Willing to speak with [facility]     Will [place] to [facility]  Will send pt DORCAS/PHQ questionnaires to be updated  Was able to get pt a phone visit tomorrow at 1145 with Esequiel  Pt updated with Emergency lines, [facility] center at [facility].  Pt verbalized understanding, in agreement with plan, and voiced no further questions.  Jyothi Solis RN on 11/7/2023 at 12:41 PM

## 2023-11-08 ENCOUNTER — VIRTUAL VISIT (OUTPATIENT)
Dept: OBGYN | Facility: CLINIC | Age: 33
End: 2023-11-08
Payer: COMMERCIAL

## 2023-11-08 DIAGNOSIS — N91.2 AMENORRHEA: ICD-10-CM

## 2023-11-08 DIAGNOSIS — F41.9 ANXIETY: ICD-10-CM

## 2023-11-08 DIAGNOSIS — E03.9 ACQUIRED HYPOTHYROIDISM: Primary | ICD-10-CM

## 2023-11-08 DIAGNOSIS — R61 NIGHT SWEATS: ICD-10-CM

## 2023-11-08 PROCEDURE — 99214 OFFICE O/P EST MOD 30 MIN: CPT | Mod: 93 | Performed by: OBSTETRICS & GYNECOLOGY

## 2023-11-08 RX ORDER — SERTRALINE HYDROCHLORIDE 25 MG/1
25 TABLET, FILM COATED ORAL DAILY
Qty: 90 TABLET | Refills: 0 | Status: SHIPPED | OUTPATIENT
Start: 2023-11-08 | End: 2024-09-16

## 2023-11-08 ASSESSMENT — ANXIETY QUESTIONNAIRES
2. NOT BEING ABLE TO STOP OR CONTROL WORRYING: NOT AT ALL
3. WORRYING TOO MUCH ABOUT DIFFERENT THINGS: NOT AT ALL
5. BEING SO RESTLESS THAT IT IS HARD TO SIT STILL: NOT AT ALL
7. FEELING AFRAID AS IF SOMETHING AWFUL MIGHT HAPPEN: NOT AT ALL
1. FEELING NERVOUS, ANXIOUS, OR ON EDGE: SEVERAL DAYS
6. BECOMING EASILY ANNOYED OR IRRITABLE: SEVERAL DAYS
GAD7 TOTAL SCORE: 2
GAD7 TOTAL SCORE: 2
IF YOU CHECKED OFF ANY PROBLEMS ON THIS QUESTIONNAIRE, HOW DIFFICULT HAVE THESE PROBLEMS MADE IT FOR YOU TO DO YOUR WORK, TAKE CARE OF THINGS AT HOME, OR GET ALONG WITH OTHER PEOPLE: SOMEWHAT DIFFICULT

## 2023-11-08 ASSESSMENT — PATIENT HEALTH QUESTIONNAIRE - PHQ9
5. POOR APPETITE OR OVEREATING: NOT AT ALL
SUM OF ALL RESPONSES TO PHQ QUESTIONS 1-9: 3

## 2023-11-08 NOTE — PROGRESS NOTES
Nory Woods is a 33 year old female who is being evaluated via a billable telephone visit.      What phone number would you like to be contacted at? [phone]   How would you like to obtain your AVS? Rolan      Originating Location (pt. Location): Home       Distant Location (provider location):  On-site      SUBJECTIVE:                                                   Nory Woods is a 33 year old female who presents for virtual visit today for the following health issue(s):  Patient presents with:  Postpartum Care  Depression      Additional information:PP rage? Burst of anger.     HPI:  Patient is having a virtual visit by phone today to discuss anger, rage, anxiety, feelings of being overwhelmed.  She is also having some night sweats and still no periods.  Is breastfeeding her baby who is now 7 months old. However has hypothyroidism and very low dose 25mcg levoxyl with normal TSH at her 6 week PP visit.  Was going to repeat in 4-6 months and hasn't done that yet so would like to have orders pended for her to do that.    Patient states she has had anxiety most of her life. Definitely escalated around age [age] when graduated college and began working. Did end up going on sertraline and thinks it was only 25mg around that time.  Was on it a year and has been off since.  Recalls it definitely worked really well w/o any side effects. However it was difficult to get off of it even though did a slow wean b/c had really up and down emotions and mood swings.  Eventually stabilized and no issues until the last several months after having the baby    Initially didn't have problems though expected some PP depression/anxiety but was ok.  Baby was great until 4 months and then reverted sleep patterns and has not sleep throught the night, or even more than a 2-3 hour stretch in weeks, until the last 2-3 days.  At one point was waking up nearly hourly, or they were just holding him for 2-3 hours between  "feedings b/c were desperate to get sleep of any kind even though knew they shouldn't  Finally started to sleep train and was giving at least a 4-5 hour stretch and then got sick and it backslid  Just in the last 3 days have started to sleep train again and last night slept 7-3, woke and nursed, and back down 330-630 which is the best she's had since he was born and today from that alone is feeling much better    Hates the idea of meds but yesterday had a \"nervous breakdown\" and her  told her to make an appointment with me and happened to get one today  Work is a huge stress especially, at times are asking her to do such a huge amount of work that 2-3 people wouldn't be asked to do. This will trigger her rage and then feels completely out of control, like she wants to throw things and break things. Wants to smash her phone or throw it out a window. Will then be incredibly short and angry with  or get very frustrated and overwhelmed with the baby. Would never hurt herself or the baby but at times has had to just walk away from him when he's crying and not consolable b/c feels like she may lose it.  Has many days that are fine and normal stress and manageable but has had at least 3 times of such excessive rage that it's scared her.  Very recently actually realized she was repeatedly scratching at her arm out of anxiety and stress as a way to calm herself. Patient states \"I have never done anything like that before. When I realized I was like what am I doing??\"    No LMP recorded..     Patient is not sexually active, .  Using none for contraception.    reports that she has never smoked. She has never used smokeless tobacco.      Health maintenance updated:  yes    Today's PHQ-2 Score:       5/10/2023    10:44 AM   PHQ-2 (  [employer])   Q1: Little interest or pleasure in doing things 0   Q2: Feeling down, depressed or hopeless 0   PHQ-2 Score 0     Today's PHQ-9 Score:       2023    11:32 AM "   PHQ-9 SCORE   PHQ-9 Total Score 3     Today's DORCAS-7 Score:       2023    11:32 AM   DORCAS-7 SCORE   Total Score 2       Problem list and histories reviewed & adjusted, as indicated.  Additional history: as documented.    Patient Active Problem List   Diagnosis     Hypothyroidism     PCOS (polycystic ovarian syndrome)     Miscarriage     Anxiety     Past Surgical History:   Procedure Laterality Date      SECTION N/A 3/25/2023    Procedure:  section;  Surgeon: January Lorenzo MD;  Location:  L+D     DILATION AND CURETTAGE SUCTION N/A 2021    Procedure: DILATION AND CURETTAGE, UTERUS, USING SUCTION;  Surgeon: Radha Iraheta MD;  Location:  OR     WISDOM TOOTH EXTRACTION        Social History     Tobacco Use     Smoking status: Never     Smokeless tobacco: Never   Substance Use Topics     Alcohol use: Not Currently      Problem (# of Occurrences) Relation (Name,Age of Onset)    Alzheimer Disease (1) Paternal Grandmother    Hypertension (1) Maternal Grandfather    Myocardial Infarction (1) Maternal Grandfather    Endometriosis (1) Mother    Pacemaker (1) Paternal Grandfather            Current Outpatient Medications   Medication Sig     levothyroxine (SYNTHROID/LEVOTHROID) 25 MCG tablet Take 1 tablet (25 mcg) by mouth daily     Prenatal Vit-Fe Fumarate-FA (PRENATAL MULTIVITAMIN W/IRON) 27-0.8 MG tablet Take 1 tablet by mouth daily     sertraline (ZOLOFT) 25 MG tablet Take 1 tablet (25 mg) by mouth daily     No current facility-administered medications for this visit.     No Known Allergies      OBJECTIVE:     No vitals were obtained today due to virtual visit.    Physical Exam    PSYCH: Mentation appears normal, affect normal/bright, judgement and insight intact, normal speech           ASSESSMENT/PLAN:                                                      Phone call duration: 14 minutes with an additional 8 minutes of chart review and completion for a total of 22 minutes on the same  "DOS      ICD-10-CM    1. Acquired hypothyroidism  E03.9 TSH      2. Anxiety  F41.9 sertraline (ZOLOFT) 25 MG tablet      3. Night sweats  R61       4. Amenorrhea  N91.2           Patient reassured that night sweats and amenorrhea are likely just from still nursing, however given the PP period is a frequent time of thyroid med adjustment, do agree she should have that checked just to be sure  TSH order placed and can make lab appointment at any time and will then f/up with her and med adjust as indicated.    In terms of her DORCAS and now rage and anger we had extensive discussion about worsening sx from work stress and sleep deprivation as very clearly and obviously a component to her extreme moments  However in discussing her DORCAS over her life she states \"I probably do ruminate and worry about worst case scenarios more than other people but that's just me\"  Admits that her baseline anxiety may need overall improvement while the extremes she's having now may be more situational.  However as most people she doesn't like the idea of needing medication at all much less long term.  Asks that if she starts meds how will she stop them, wants to try for another baby in a year or so and then would need to wean and again have s.e, etc    Reviewed with her the biology and genetics of anxiety and depression and that they are medical diagnoses like poor vision or HTN or anything else  She doesn't have to plan when to stop meds the day she decides to start them and feel overwhelmed by that and can just start and see how she does  Can always wean or stop at any point with my guidance and certainly does not have to stop SSRIs for pregnancy other than paxil, so wouldn't have to stop if has good sx control  Reviewed pros/cons/side effects/long and short term risks being exceedingly small, etc    Patient really appreciative of the discussion and talking through it and having some outside perspective on anxiety as a whole.  Discussed " that her scratching herself is a version of cutting, not of course conscious this time, or as severe, but it is like any other physical manifestation of anxiety tot decrease mental strain by converting to a physical discomfort to improve the mental part and self soothe and this can be a sx of worsening anxiety that she's not appreciating b/c only really aware of it when has the anger outbursts    Strongly encouraged her to find a psychologist to discuss behavioral and situational stressors with but to consider restart on sertraline  Could do a very low dose as this worked for her and always titrate up as needed given she is so reticent to restart  Patient did agree to start 25mg at first but then asked if it was ok to give it a week and just see if baby's improved sleep also helps her and may feel less need for meds.  Support whatever choice she feels is best for her, Rx was sent, and when/if she starts should then make a f/up visit with  Me 6 weeks later to discuss effects/efficacy/s.e  Could do in person and do tSH then as well, or could do separately        Radha Iraheta MD  [facility]

## 2023-11-09 PROBLEM — O09.92 SUPERVISION OF HIGH RISK PREGNANCY IN SECOND TRIMESTER: Status: RESOLVED | Noted: 2023-03-23 | Resolved: 2023-11-09

## 2023-11-09 PROBLEM — Z34.00 SUPERVISION OF NORMAL FIRST PREGNANCY: Status: RESOLVED | Noted: 2021-12-07 | Resolved: 2023-11-09

## 2023-11-09 PROBLEM — Z3A.40 40 WEEKS GESTATION OF PREGNANCY: Status: RESOLVED | Noted: 2023-03-17 | Resolved: 2023-11-09

## 2023-11-09 PROBLEM — O99.283 HYPOTHYROID IN PREGNANCY, ANTEPARTUM, THIRD TRIMESTER: Status: RESOLVED | Noted: 2023-03-23 | Resolved: 2023-11-09

## 2023-11-09 PROBLEM — F41.9 ANXIETY: Status: ACTIVE | Noted: 2023-11-09

## 2023-11-09 PROBLEM — E03.9 HYPOTHYROID IN PREGNANCY, ANTEPARTUM, THIRD TRIMESTER: Status: RESOLVED | Noted: 2023-03-23 | Resolved: 2023-11-09

## 2023-11-22 ENCOUNTER — LAB (OUTPATIENT)
Dept: LAB | Facility: CLINIC | Age: 33
End: 2023-11-22
Payer: COMMERCIAL

## 2023-11-22 DIAGNOSIS — E03.9 ACQUIRED HYPOTHYROIDISM: ICD-10-CM

## 2023-11-22 LAB — TSH SERPL DL<=0.005 MIU/L-ACNC: 1.61 UIU/ML (ref 0.3–4.2)

## 2023-11-22 PROCEDURE — 84443 ASSAY THYROID STIM HORMONE: CPT

## 2023-11-22 PROCEDURE — 36415 COLL VENOUS BLD VENIPUNCTURE: CPT

## 2024-06-27 NOTE — PROGRESS NOTES
BPD 9.36 cm 38w1d 95.3%   HC 32.81 cm 37w2d 47.5%   AC 32.02cm 36w0d 55%   FL 6.78 cm 34w6d 15.7%   EFW (lbs/oz) 6 lbs               4ozs       EFW (g) 2830 g 48.5%        Fetal heart rate: 138 bpm  Fetal presentation: Cephalic  Amniotic fluid: 5.2cm MVP      Patient is being seen today for her 36 week OB visit.   Growth scan done and normal. Stable small area near stomach is again seen but no growth  Weight gain is appropriate. Edema is normal. No bh/ctx.   Feeling good. Glad that this pregnancy is less anxiety filled now.   Hypothyroid for 7 years. Was originally at 50 mcg and now on 25 mcg. TSH today.   Notices potentially a small lump in her neck but nothing visible on exam.  Has a hx of depression. Admits to having some anxiety/depression after D&C last year. Hospital stay after D&C did not help the experience so nervous for delivery and postpartum period.   Discussed normal expectations of some levels of anxiety and PP blues vs what is concerning. Patient was on zoloft in the past and tolerated it well.   If feels she needs to restart in the PP period can contact us to restart. could also just start in anticipation now or immediately PP  Will monitor sx for now and contact us if feels she would like meds  Took the labor and delivery course and nervous for ctx pain.   Discussed ctx timing and early vs true labor to call/come in for.  Discussed epidural timing as well as IV options and nitrous.  Discussed bouncing on a ball, taking a bath, and overall comfort cares. Discussed how to self triage for SROM vs normal vaginal discharge   Discussed GBS and risks in long delivery as well as treatment.   GBS testing done today.   Cervix exam is  Cl/thick/-4  Discussed membrane sweep and when/if appropriate to do.   Return in 1 wk.          Labs ordered.

## 2024-06-29 ENCOUNTER — HEALTH MAINTENANCE LETTER (OUTPATIENT)
Age: 34
End: 2024-06-29

## 2024-09-16 ENCOUNTER — OFFICE VISIT (OUTPATIENT)
Dept: OBGYN | Facility: CLINIC | Age: 34
End: 2024-09-16
Payer: COMMERCIAL

## 2024-09-16 VITALS
DIASTOLIC BLOOD PRESSURE: 60 MMHG | HEIGHT: 67 IN | SYSTOLIC BLOOD PRESSURE: 96 MMHG | WEIGHT: 138.2 LBS | BODY MASS INDEX: 21.69 KG/M2

## 2024-09-16 DIAGNOSIS — E03.9 ACQUIRED HYPOTHYROIDISM: ICD-10-CM

## 2024-09-16 DIAGNOSIS — Z13.6 ENCOUNTER FOR LIPID SCREENING FOR CARDIOVASCULAR DISEASE: ICD-10-CM

## 2024-09-16 DIAGNOSIS — Z01.84 IMMUNITY STATUS TESTING: ICD-10-CM

## 2024-09-16 DIAGNOSIS — Z13.0 SCREENING FOR DISORDER OF BLOOD AND BLOOD-FORMING ORGANS: ICD-10-CM

## 2024-09-16 DIAGNOSIS — Z13.21 ENCOUNTER FOR VITAMIN DEFICIENCY SCREENING: ICD-10-CM

## 2024-09-16 DIAGNOSIS — Z23 NEED FOR PROPHYLACTIC VACCINATION AND INOCULATION AGAINST INFLUENZA: ICD-10-CM

## 2024-09-16 DIAGNOSIS — Z13.228 SCREENING FOR METABOLIC DISORDER: ICD-10-CM

## 2024-09-16 DIAGNOSIS — Z01.419 ENCOUNTER FOR GYNECOLOGICAL EXAMINATION WITHOUT ABNORMAL FINDING: Primary | ICD-10-CM

## 2024-09-16 DIAGNOSIS — Z13.1 SCREENING FOR DIABETES MELLITUS: ICD-10-CM

## 2024-09-16 DIAGNOSIS — Z13.220 ENCOUNTER FOR LIPID SCREENING FOR CARDIOVASCULAR DISEASE: ICD-10-CM

## 2024-09-16 LAB
HBV SURFACE AB SERPL IA-ACNC: 351 M[IU]/ML
HBV SURFACE AB SERPL IA-ACNC: REACTIVE M[IU]/ML
TSH SERPL DL<=0.005 MIU/L-ACNC: 1.77 UIU/ML (ref 0.3–4.2)

## 2024-09-16 PROCEDURE — 90471 IMMUNIZATION ADMIN: CPT | Performed by: OBSTETRICS & GYNECOLOGY

## 2024-09-16 PROCEDURE — 90656 IIV3 VACC NO PRSV 0.5 ML IM: CPT | Performed by: OBSTETRICS & GYNECOLOGY

## 2024-09-16 PROCEDURE — 99459 PELVIC EXAMINATION: CPT | Performed by: OBSTETRICS & GYNECOLOGY

## 2024-09-16 PROCEDURE — 36415 COLL VENOUS BLD VENIPUNCTURE: CPT | Performed by: OBSTETRICS & GYNECOLOGY

## 2024-09-16 PROCEDURE — 99395 PREV VISIT EST AGE 18-39: CPT | Mod: 25 | Performed by: OBSTETRICS & GYNECOLOGY

## 2024-09-16 PROCEDURE — 84443 ASSAY THYROID STIM HORMONE: CPT | Performed by: OBSTETRICS & GYNECOLOGY

## 2024-09-16 PROCEDURE — 86706 HEP B SURFACE ANTIBODY: CPT | Performed by: OBSTETRICS & GYNECOLOGY

## 2024-09-16 PROCEDURE — 99213 OFFICE O/P EST LOW 20 MIN: CPT | Mod: 25 | Performed by: OBSTETRICS & GYNECOLOGY

## 2024-09-16 RX ORDER — PROPRANOLOL HYDROCHLORIDE 10 MG/1
10 TABLET ORAL EVERY 4 HOURS PRN
COMMUNITY
Start: 2024-04-05 | End: 2024-09-16

## 2024-09-16 NOTE — PROGRESS NOTES
Nory is a 34 year old  female who presents for annual exam.     Besides routine health maintenance,  she would like to discuss tsh and not fasting.    HPI:  The patient's PCP is  Tatianna Dalal PA-C     Patient presents for an annual visit.    Cycles are long, 35-39 days, which is similar to how they were before Mohamud. Used BBT to track ovulation and was able to get pregnant after 2 months of doing that,  so hopeful they won't need additional interventions.  Stopped breast feeding 2024 d/t having many viral illnessses and being really sick for a while and so ended up stopping after 9-10 months or so and  period returned within a month of stopping.    Starting to think about having another child, likely won't start trying until this winter so there's at least 2.5 years between the kids.  Inquired about having to have another  with next preg.  labored for while and had FTP at 7 cm and OP and he was 8-5# at 41+2.  Only planning on having 1 more child.    Used to take levothyroxine 25 mcg mostly for subclinical hypothyroidism and with oligoovulation/long cycles/pcos to help conceive. However has been off of levox for awhile but stopped. Completely normal TSH 10 months, ago, when mohamud was about 8 months old and would like to check today as well.       GYNECOLOGIC HISTORY:    Patient's last menstrual period was 2024 (exact date).    Regular menses? yes  Menses every 35-39 days.  Length of menses: 5 days    Her current contraception method is: none.  She  reports that she has never smoked. She has never used smokeless tobacco.    Patient is sexually active.  STD testing offered?  Declined  Last PHQ-9 score on record =       2023    11:32 AM   PHQ-9 SCORE   PHQ-9 Total Score 3     Last GAD7 score on record =       2023    11:32 AM   DORCAS-7 SCORE   Total Score 2     Alcohol Score =     HEALTH MAINTENANCE:  Cholesterol: NA  Last Mammo: Not applicable, Result: Not applicable, Next Mammo:  Due at age 42     Pap:   Lab Results   Component Value Date    GYNINTERP  05/10/2023     Negative for Intraepithelial Lesion or Malignancy (NILM)     Colonoscopy:  NA, Result: Not applicable, Next Colonoscopy: 45 years.  Dexa:  NA    Health maintenance updated:  Yes    HISTORY:  OB History    Para Term  AB Living   2 1 1 0 1 1   SAB IAB Ectopic Multiple Live Births   0 0 0 0 1      # Outcome Date GA Lbr Flaquito/2nd Weight Sex Type Anes PTL Lv   2 Term 23 41w2d 14:43 / 04:39 3.77 kg (8 lb 5 oz) M CS-LTranv EPI N LAWRENCE      Birth Comments: followed by [facility], delivered by Maura. FTP at 7cm, OP, had U/s concerning for aortic isthmus narrowing but then improved on f/up imaging      Complications: Failure to Progress in Second Stage      Name: MARI GERMAN      Apgar1: 8  Apgar5: 9   1 AB 21 10w6d             Birth Comments: 10 weeks by sure LMP measuring 8+2 with no FHTs. D&C with Esequiel. had seen the CNMs prior to that       Patient Active Problem List   Diagnosis    Hypothyroidism    PCOS (polycystic ovarian syndrome)    Miscarriage    Anxiety     Past Surgical History:   Procedure Laterality Date     SECTION N/A 3/25/2023    Procedure:  section;  Surgeon: January Lorenzo MD;  Location:  L+D    DILATION AND CURETTAGE SUCTION N/A 2021    Procedure: DILATION AND CURETTAGE, UTERUS, USING SUCTION;  Surgeon: Radha Iraheta MD;  Location:  OR    WISDOM TOOTH EXTRACTION        Social History     Tobacco Use    Smoking status: Never    Smokeless tobacco: Never   Substance Use Topics    Alcohol use: Not Currently      Problem (# of Occurrences) Relation (Name,Age of Onset)    Alzheimer Disease (1) Paternal Grandmother    Hypertension (1) Maternal Grandfather    Myocardial Infarction (1) Maternal Grandfather    Endometriosis (1) Mother    Pacemaker (1) Paternal Grandfather              Current Outpatient Medications   Medication Sig Dispense Refill    Multiple  "Vitamin (MULTIVITAMIN ADULT PO) Take by mouth.      levothyroxine (SYNTHROID/LEVOTHROID) 25 MCG tablet Take 1 tablet (25 mcg) by mouth daily (Patient not taking: Reported on 9/16/2024) 90 tablet 3     No current facility-administered medications for this visit.     No Known Allergies    Past medical, surgical, social and family histories were reviewed and updated in EPIC.    EXAM:  BP 96/60   Ht 1.702 m (5' 7\")   Wt 62.7 kg (138 lb 3.2 oz)   LMP 09/13/2024 (Exact Date)   Breastfeeding No   BMI 21.65 kg/m     BMI: Body mass index is 21.65 kg/m .    PHYSICAL EXAM:  Constitutional:   Appearance: Well nourished, well developed, alert, in no acute distress  Neck:  Lymph Nodes:  No lymphadenopathy present    Thyroid:  Gland size normal, nontender, no nodules or masses present  on palpation  Chest:  Respiratory Effort:  Breathing unlabored, CTA B  Cardiovascular:    Heart: Auscultation:  Regular rate, normal rhythm, no murmurs present  Breasts: Inspection of Breasts:  No lymphadenopathy present., Palpation of Breasts and Axillae:  No masses present on palpation, no breast tenderness., Axillary Lymph Nodes:  No lymphadenopathy present., and No nodularity, asymmetry or nipple discharge bilaterally.  Gastrointestinal:   Abdominal Examination:  Abdomen nontender to palpation, tone normal without rigidity or guarding, no masses present, umbilicus without lesions   Liver and Spleen:  No hepatomegaly present, liver nontender to palpation    Hernias:  No hernias present  Lymphatic: Lymph Nodes:  No other lymphadenopathy present  Skin:  General Inspection:  No rashes present, no lesions present, no areas of  discoloration  Neurologic:    Mental Status:  Oriented X3.  Normal strength and tone, sensory exam                grossly normal, mentation intact and speech normal.    Psychiatric:   Mentation appears normal and affect normal/bright.         Pelvic Exam:  External Genitalia:     Normal appearance for age, no discharge " present, no tenderness present, no inflammatory lesions present, color normal  Vagina:     Normal vaginal vault without central or paravaginal defects, no discharge present, no inflammatory lesions present, no masses present  Bladder:     Nontender to palpation  Urethra:   Urethral Body:  Urethra palpation normal, urethra structural support normal   Urethral Meatus:  No erythema or lesions present  Cervix:     Appearance healthy, no lesions present, nontender to palpation, no bleeding present  Uterus:     Uterus: firm, normal sized and nontender, anteverted in position.   Adnexa:     No adnexal tenderness present, no adnexal masses present  Perineum:     Perineum within normal limits, no evidence of trauma, no rashes or skin lesions present  Anus:     Anus within normal limits, no hemorrhoids present  Inguinal Lymph Nodes:     No lymphadenopathy present  Pubic Hair:     Normal pubic hair distribution for age  Genitalia and Groin:     No rashes present, no lesions present, no areas of discoloration, no masses present    COUNSELING:   Reviewed preventive health counseling, as reflected in patient instructions       Regular exercise       Healthy diet/nutrition       Immunizations  Vaccinated for: Influenza         Family planning    BMI: Body mass index is 21.65 kg/m .    ASSESSMENT:  34 year old female with satisfactory annual exam.    ICD-10-CM    1. Encounter for gynecological examination without abnormal finding  Z01.419       2. Acquired hypothyroidism  E03.9 TSH     TSH      3. Screening for metabolic disorder  Z13.228       4. Encounter for lipid screening for cardiovascular disease  Z13.220     Z13.6       5. Screening for diabetes mellitus  Z13.1       6. Encounter for vitamin deficiency screening  Z13.21       7. Screening for disorder of blood and blood-forming organs  Z13.0       8. Immunity status testing  Z01.84 Hepatitis B Surface Antibody     Hepatitis B Surface Antibody      9. Need for prophylactic  vaccination and inoculation against influenza  Z23         PLAN:  Pap is UTD for 4 more years.   Can follow routine ASCCP guidelines     Mammo due at age 42.    TSH today to ensure it is in optimal range in preparation for conceiving again and will also check for Hep B immunity as showing she is due for vaxx but may have had it.    Additional health issues addressed at today's visit include:    Reviewed her PCOS dx and mostly just slightly oligoovulation vs anovulation and is ovulating and aware of si/sx and can track BBTs and conceived quickly last time.  If menses become further apart or unable to conceive in 6-9 months of tracking, then can contact us and we can discuss femara, though hopefully won't be needed.    If TSH is normal then do not feel low dose levox is indicated even if trying to conceive.    Reviewed her indication for previous c/s and OP may not be a recurrent indication and may have itself been the primary reason for going postdates and for FTP at 7 cm.  So given other minimal risk factors she could certainly consider TOLAC and discussed the risks of that  Also since wouldn't be able to ripen if went postdates again, and only could do AROM/Pit IOL if favorable, then may want to have an RLTCS planned somewhere between 39-41 weeks if no labor before then.    Also discussed RLTCS and the pros/cons, scheduling, controlled environment of timing and not failed TOLAC followed by repeat c/s, reviewed surgical risks, PAS risks, etc  Patient is somewhat leaning towards just doing RLTCS but would discuss that further once she is pregnant.    Advised doing PNV with adequate folate and given her nausea last preg, patient may reach out if she becomes pregnant and needs Rx Zofran before her first OB appt.        Radha Iraheta MD

## 2024-09-18 DIAGNOSIS — E03.9 ACQUIRED HYPOTHYROIDISM: ICD-10-CM

## 2025-03-31 ENCOUNTER — TELEPHONE (OUTPATIENT)
Dept: OBGYN | Facility: CLINIC | Age: 35
End: 2025-03-31
Payer: COMMERCIAL

## 2025-03-31 NOTE — TELEPHONE ENCOUNTER
[facility] Call Center    Phone Message    May a detailed message be left on voicemail: yes     Reason for Call: Other: Patient currently 5w0d, current patient but intake not until 04/09. Called stating she's having some insomnia and wants to ask a nurse if she can take Unisom. States her insomnia starts at 1am  and she doesn't fall asleep until about 5am/5:30. Writer sent secure chat, but no one was available. Please contact patient in regards to this message. Thank you     Action Taken: Other: [facility]    Travel Screening: Not Applicable     Date of Service:

## 2025-03-31 NOTE — TELEPHONE ENCOUNTER
5w0d  Pt struggling with insomnia already   Awake from 1-5/5:30am  Does have some racing thoughts, but also has tried reading even though she is exhausted.  Feels like the sleep deprivation is making her more exhausted and more nauseous.    Did use unisom in the past. Wasn't sure when she could start.  Reiterated that this can be used safely throughout entire pregnancy as needed for insomnia and nausea.    Pt verbalized understanding, in agreement with plan, and voiced no further questions.  Jyothi Solis RN on 3/31/2025 at 9:56 AM   [facility]

## 2025-04-09 ENCOUNTER — LAB (OUTPATIENT)
Dept: LAB | Facility: CLINIC | Age: 35
End: 2025-04-09
Payer: COMMERCIAL

## 2025-04-09 DIAGNOSIS — E03.9 ACQUIRED HYPOTHYROIDISM: ICD-10-CM

## 2025-04-09 PROCEDURE — 84443 ASSAY THYROID STIM HORMONE: CPT

## 2025-04-09 PROCEDURE — 36415 COLL VENOUS BLD VENIPUNCTURE: CPT

## 2025-04-10 LAB — TSH SERPL DL<=0.005 MIU/L-ACNC: 1.88 UIU/ML (ref 0.3–4.2)

## 2025-05-12 ENCOUNTER — TELEPHONE (OUTPATIENT)
Dept: OBGYN | Facility: CLINIC | Age: 35
End: 2025-05-12
Payer: COMMERCIAL

## 2025-05-12 DIAGNOSIS — O09.299 HISTORY OF MISCARRIAGE, CURRENTLY PREGNANT: Primary | ICD-10-CM

## 2025-05-12 NOTE — TELEPHONE ENCOUNTER
RN informed pt of plan for viability assessment  Serial HCG's scheduled  Viability US 6-7 wks and visit AND Then 6/16 will have US and new OB denisse Iraheta    Pt verbalized understanding, in agreement with plan, and voiced no further questions.    Susan Morocho RN on 5/12/2025 at 4:29 PM

## 2025-05-12 NOTE — TELEPHONE ENCOUNTER
[facility] Call Center    Phone Message    May a detailed message be left on voicemail: yes     Reason for Call: Appointment Intake    Referring Provider Name: Self referred  Diagnosis and/or Symptoms: IOB    Pt is unsure of her LMP or GA due to this being a chemical pregnancy. Please review and call pt to advise at # [phone].    Action Taken: Message routed to:  Other: [facility]    Travel Screening: Not Applicable

## 2025-05-12 NOTE — TELEPHONE ENCOUNTER
How about if we change the 6/16 to a NOB and get her an U/S before it and then can do serial HCGs for now and can also add a viability after 6 weeks separately  Orders placed

## 2025-05-12 NOTE — TELEPHONE ENCOUNTER
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 And actively TTC  +UPT 5/9/2025  No bleeding since the 4/1/25 bleeding/chemical pregnancy  Had negative UPT's after 4/1 bleed.  Feels she is about 4w0d today based on ovulation.    Asking for serial HCG's and then new OB apts - she already has an apt scheduled 6/16/2025 w Dr Iraheta - this was to just get in and discuss fertility but now currently pregnancy (would be 9wks at the scheduled visit time).     Informed patient of Dr Iraheta leaving to a non clinic position. She would like to see her one last time.    RN routing to Dr Iraheta to advise - assume ok w serial HCG's for reassurance around 4-5 wks? Would you want her to schedule w another provider to start her OB process or see her at 9wks for US and visit to start? Thoughts?    Susan Morocho RN on 5/12/2025 at 10:23 AM

## 2025-05-19 ENCOUNTER — LAB (OUTPATIENT)
Dept: LAB | Facility: CLINIC | Age: 35
End: 2025-05-19
Payer: COMMERCIAL

## 2025-05-19 DIAGNOSIS — O09.299 HISTORY OF MISCARRIAGE, CURRENTLY PREGNANT: ICD-10-CM

## 2025-05-19 LAB — HCG INTACT+B SERPL-ACNC: 1444 MIU/ML

## 2025-05-19 PROCEDURE — 36415 COLL VENOUS BLD VENIPUNCTURE: CPT

## 2025-05-19 PROCEDURE — 84702 CHORIONIC GONADOTROPIN TEST: CPT

## 2025-05-20 ENCOUNTER — RESULTS FOLLOW-UP (OUTPATIENT)
Dept: OBGYN | Facility: CLINIC | Age: 35
End: 2025-05-20

## 2025-05-20 ENCOUNTER — VIRTUAL VISIT (OUTPATIENT)
Dept: OBGYN | Facility: CLINIC | Age: 35
End: 2025-05-20
Payer: COMMERCIAL

## 2025-05-20 DIAGNOSIS — O09.91 SUPERVISION OF HIGH RISK PREGNANCY IN FIRST TRIMESTER: Primary | ICD-10-CM

## 2025-05-20 DIAGNOSIS — Z13.79 GENETIC SCREENING: ICD-10-CM

## 2025-05-20 DIAGNOSIS — E03.8 SUBCLINICAL HYPOTHYROIDISM: ICD-10-CM

## 2025-05-20 DIAGNOSIS — O99.341 ANXIETY DURING PREGNANCY IN FIRST TRIMESTER, ANTEPARTUM: ICD-10-CM

## 2025-05-20 DIAGNOSIS — F41.9 ANXIETY DURING PREGNANCY IN FIRST TRIMESTER, ANTEPARTUM: ICD-10-CM

## 2025-05-20 PROBLEM — O09.90 SUPERVISION OF HIGH-RISK PREGNANCY: Status: ACTIVE | Noted: 2025-05-20

## 2025-05-20 PROCEDURE — 99207 PR NO CHARGE NURSE ONLY: CPT | Mod: 93

## 2025-05-20 RX ORDER — MULTIVITAMIN WITH IRON
1 TABLET ORAL DAILY
COMMUNITY

## 2025-05-20 NOTE — Clinical Note
"Very anxious about her HCG tomorrow - is not having Nausea like previous pregnancies so thinks something is wrong; admits to melt downs and asked about restarting sertraline (prev dose 25mg per file) - thoughts on restarting now? Also hx of  'hypothyroid\" but not on any meds - she wants to have this checked too. Do you want to add this to HCG blood draw tomorrow? Susan"

## 2025-05-20 NOTE — PROGRESS NOTES
"  SUBJECTIVE:     HPI:    This is a 34 year old female patient,  who presents for her first obstetrical visit.    CHELSEY: 2026, by Last Menstrual Period.  She is 7w0d weeks - unknown GA, but around 5 wks at time of nurse phone visit.  Her cycles are 2025 chemical pregnancy, no period after/prior to next pregnancy.  Her last menstrual period was no LMP.   Since her LMP, she has experienced  breast tenderness and bloating only).   She denies nausea, emesis, abdominal pain, fatigue, headache, loss of appetite, vaginal discharge, dysuria, pelvic pain, urinary urgency, lightheadedness, urinary frequency, vaginal bleeding, hemorrhoids, and constipation.    Additional History: -Hx of missed AB and chemical pregnancy - Serial HCG's  1, 444 and  __  -hx of C/S 41w2d, 8lb5oz boy - followed by [facility], delivered by Marilyn. FTP at 7cm, OP, had U/s concerning for aortic isthmus narrowing but then improved on f/up imaging at 32wks  -hx of hypothyroid - on levo 25 mcg for 1st pregnancy - no meds after delivery/or now  -anxiety/depression hx - sertraline 25 mg in past, not now; has had periods of anxiety over this pregnancy, worried about viability/miscarriage; brown spotting 2 wks prior to nurse visit, reassurance given.        Have you travelled during the pregnancy?Yes, If yes, where? [place] in 2024  Have your sexual partner(s) travelled during the pregnancy?No    HISTORY:   Planned Pregnancy: Yes  Marital Status:   Occupation:  at [employer]  Living in Household: Spouse and Children    Past History:  Her past medical history   Past Medical History:   Diagnosis Date    Anxiety 2023    Depressive disorder     History of PCOS     had amenorrhea in college. pt reports hirsutism most of her life but also \"very skinny\" in college and occasionally had cysts. normal and regular periods in adult life    Hypothyroidism     Mild depression     Tinnitus, bilateral    .  "     She has a history of  prior  section     Since her last LMP she denies use of alcohol, tobacco and street drugs.    Past medical, surgical, social and family history were reviewed and updated in EP[facility].    Current Outpatient Medications   Medication Sig Dispense Refill    magnesium 250 MG tablet Take 1 tablet by mouth daily.      Prenatal-FeFum-FA-DHA w/o A (PRENATAL + DHA PO) Take by mouth.       No current facility-administered medications for this visit.     Confirmed patient desires delivery at [facility] with the [facility] provider.    Nurse phone visit completed. Prenatal book and folder (containing standard educational hand-outs and brochures) will be given at next visit to patient. Information in folder reviewed over the phone. Questions answered. Brochure given on optional screening available to assess chromosomal anomalies. Pt desires NIPS. Pt advised to call the clinic if she has any questions or concerns related to her pregnancy. Prenatal labs future ordered. New prenatal visit scheduled on 2025 with Dr Iraheta.  Susan Morocho RN on 2025 at 3:50 PM    5/10/2023 Pap NIL and HPV negative    PHQ-9 score:        2025     2:03 PM   PHQ   PHQ-9 Total Score 0    Q9: Thoughts of better off dead/self-harm past 2 weeks Not at all       Patient-reported         2023     2:28 PM 2023    11:32 AM 2025     2:03 PM   DORCAS-7 SCORE   Total Score 2 (minimal anxiety)  1 (minimal anxiety)   Total Score 2 2 1        Patient-reported     Patient supplied answers from flow sheet for:  Prenatal OB Questionnaire.  Past Medical History  Have you ever recieved care for your mental health? : (!) Yes  Have you ever been in a major accident or suffered serious trauma?: No  Within the last year, has anyone hit, slapped, kicked or otherwise hurt you?: No  In the last year, has anyone forced you to have sex when you didn't want to?: No    Past Medical History 2    Have you ever received a blood transfusion?: No  Would you accept a blood transfusion if was medically recommended?: Yes  Does anyone in your home smoke?: No   Is your blood type Rh negative?: Unknown  Have you ever ?: (!) Yes  Have you been hospitalized for a nonsurgical reason excluding normal delivery?: No  Have you ever had an abnormal pap smear?: No    Past Medical History (Continued)  Do you have a history of abnormalities of the uterus?: No  Did your mother take SONY or any other hormones when she was pregnant with you?: No  Do you have any other problems we have not asked about which you feel may be important to this pregnancy?: No

## 2025-05-20 NOTE — PATIENT INSTRUCTIONS
Learning About Pregnancy  Your Care Instructions     Your health in the early weeks of your pregnancy is particularly important for your baby's health. Take good care of yourself. Anything you do that harms your body can also harm your baby.  Make sure to go to all of your doctor appointments. Regular checkups will help keep you and your baby healthy.  How can you care for yourself at home?  Diet    Choose healthy foods like fruits, vegetables, whole grains, lean proteins, and healthy fats.     Choose foods that are good sources of calcium, iron, and folate. You can try dairy products, dark leafy greens, fortified orange juice and cereals, almonds, broccoli, dried fruit, and beans.     Do not skip meals or go for many hours without eating. If you are nauseated, try to eat a small, healthy snack every 2 to 3 hours.     Avoid fish that are high in mercury. These include shark, swordfish, nickie mackerel, marlin, orange roughy, and bigeye tuna, as well as tilefish from the [facility].     It's okay to eat up to 8 to 12 ounces a week of fish that are low in mercury or up to 4 ounces a week of fish that have medium levels of mercury. Some fish that are low in mercury are salmon, shrimp, canned light tuna, cod, and tilapia. Some fish that have medium levels of mercury are halibut and white albacore tuna.     Drink plenty of fluids. If you have kidney, heart, or liver disease and have to limit fluids, talk with your doctor before you increase the amount of fluids you drink.     Limit caffeine to about 200 to 300 mg per day. On average, a cup of brewed coffee has around 80 to 100 mg of caffeine.     Do not drink alcohol, such as beer, wine, or hard liquor.     Take a multivitamin that contains at least 400 micrograms (mcg) of folic acid to help prevent birth defects. Fortified cereal and whole wheat bread are good additional sources of folic acid.     Increase the calcium in your diet. Try to drink a quart of skim milk  each day. You may also take calcium supplements and choose foods such as cheese and yogurt.   Lifestyle    Make sure you go to your follow-up appointments.     Get plenty of rest. You may be unusually tired while you are pregnant.     Get at least 30 minutes of exercise on most days of the week. Walking is a good choice. If you have not exercised in the past, start out slowly. Take several short walks each day.     Do not smoke. If you need help quitting, talk to your doctor about stop-smoking programs. These can increase your chances of quitting for good.     Do not touch cat feces or litter boxes. Also, wash your hands after you handle raw meat, and fully cook all meat before you eat it. Wear gloves when you work in the yard or garden, and wash your hands well when you are done. Cat feces, raw or undercooked meat, and contaminated dirt can cause an infection that may harm your baby or lead to a miscarriage.     Avoid things that can make your body too hot and may be harmful to your baby, such as a hot tub or sauna. Or talk with your doctor before doing anything that raises your body temperature. Your doctor can tell you if it's safe.     Avoid chemical fumes, paint fumes, or poisons.     Do not use illegal drugs, marijuana, or alcohol.   Medicines    Review all of your medicines with your doctor. Some of your routine medicines may need to be changed to protect your baby.     Use acetaminophen (Tylenol) to relieve minor problems, such as a mild headache or backache or a mild fever with cold symptoms. Do not use nonsteroidal anti-inflammatory drugs (NSAIDs), such as ibuprofen (Advil, Motrin) or naproxen (Aleve), unless your doctor says it is okay.     Do not take two or more pain medicines at the same time unless the doctor told you to. Many pain medicines have acetaminophen, which is Tylenol. Too much acetaminophen (Tylenol) can be harmful.     Take your medicines exactly as prescribed. Call your doctor if you  "think you are having a problem with your medicine.   To manage morning sickness    Keep food in your stomach, but not too much at once. Try eating five or six small meals a day instead of three large meals.     For nausea when you wake up, eat a small snack, such as a couple of crackers or pretzels, before rising. Allow a few minutes for your stomach to settle before you slowly get up.     Try to avoid smells and foods that make you feel nauseated. High-fat or greasy foods, milk, and coffee may make nausea worse. Some foods that may be easier to tolerate include cold, spicy, sour, and salty foods.     Drink enough fluids. Water and other caffeine-free drinks are good choices.     Take your prenatal vitamins at night on a full stomach.     Try foods and drinks made with kim. Kim may help with nausea.     Get lots of rest. Morning sickness may be worse when you are tired.     Talk to your doctor about over-the-counter products, such as vitamin B6 or doxylamine, to help relieve symptoms.     Try a P6 acupressure wrist band. These anti-nausea wristbands help some people.   Follow-up care is a key part of your treatment and safety. Be sure to make and go to all appointments, and call your doctor if you are having problems. It's also a good idea to know your test results and keep a list of the medicines you take.  Where can you learn more?  Go to https://www.GnuBIO.net/patiented  Enter E868 in the search box to learn more about \"Learning About Pregnancy.\"  Current as of: April 30, 2024  Content Version: 14.4    0390-6455 [employer].   Care instructions adapted under license by your healthcare professional. If you have questions about a medical condition or this instruction, always ask your healthcare professional. [employer] disclaims any warranty or liability for your use of this information.    Weeks 6 to 10 of Your Pregnancy: Care Instructions  During these weeks of pregnancy, your " "body goes through many changes. You may start to feel different, both in your body and your emotions. Each pregnancy is different, so there's no \"right\" way to feel. These early weeks are a time to make healthy choices for you and your pregnancy.    Take a daily prenatal vitamin. Choose one with folic acid in it.   Avoid alcohol, tobacco, and drugs (including marijuana). If you need help quitting, talk to your doctor.     Drink plenty of liquids.  Be sure to drink enough water. And limit sodas, other sweetened drinks, and caffeine.     Choose foods that are good sources of calcium, iron, and folate.  You can try dairy products, dark leafy greens, fortified orange juice and cereals, almonds, broccoli, dried fruit, and beans.     Avoid foods that may be harmful.  Don't eat raw meat, deli meat, raw seafood, or raw eggs. Avoid soft cheese and unpasteurized dairy, like Brie and blue cheese. And don't eat fish that contains a lot of mercury, like shark and swordfish.     Don't touch steve litter or cat poop.  They can cause an infection that could be harmful during pregnancy.     Avoid things that can make your body too hot.  For example, avoid hot tubs and saunas.     Soothe morning sickness.  Try eating 5 or 6 small meals a day, getting some fresh air, or using kristen to control symptoms.     Ask your doctor about flu and COVID-19 shots.  Getting them can help protect against infection.   Follow-up care is a key part of your treatment and safety. Be sure to make and go to all appointments, and call your doctor if you are having problems. It's also a good idea to know your test results and keep a list of the medicines you take.  Where can you learn more?  Go to https://www.Mint.net/patiented  Enter G112 in the search box to learn more about \"Weeks 6 to 10 of Your Pregnancy: Care Instructions.\"  Current as of: April 30, 2024  Content Version: 14.4    2934-4072 [facility] [employer].   Care instructions adapted " under license by your healthcare professional. If you have questions about a medical condition or this instruction, always ask your healthcare professional. [employer] disclaims any warranty or liability for your use of this information.       Pregnancy: Managing Morning Sickness (01:48)  Your health professional recommends that you watch this short online health video.  Learn how to manage morning sickness during pregnancy.   Purpose: Learn how to manage morning sickness during pregnancy.  Goal: Learn how to manage morning sickness during pregnancy.    Watch: Scan the QR code or visit the link to view video       https://hwi.[email or web address]  Current as of: April 30, 2024  Content Version: 14.4    9261-2027 [employer].   Care instructions adapted under license by your healthcare professional. If you have questions about a medical condition or this instruction, always ask your healthcare professional. [employer] disclaims any warranty or liability for your use of this information.    Pregnancy and Heartburn: Care Instructions  Overview     Heartburn is a common problem during pregnancy.  Heartburn happens when stomach acid backs up into the tube that carries food to the stomach. This tube is called the esophagus. Early in pregnancy, heartburn is caused by hormone changes that slow down digestion. Later on, it's also caused by the large uterus pushing up on the stomach.  Even though you can't fix the cause, there are things you can do to get relief. Treating heartburn during pregnancy focuses first on making lifestyle changes, like changing what and how you eat, and on taking medicines.  Heartburn usually improves or goes away after childbirth.  Follow-up care is a key part of your treatment and safety. Be sure to make and go to all appointments, and call your doctor if you are having problems. It's also a good idea to know your test results and keep a list of the medicines you  "take.  How can you care for yourself at home?  Eat small, frequent meals.  Avoid foods that make your symptoms worse, such as chocolate, peppermint, and spicy foods. Avoid drinks with caffeine, such as coffee, tea, and sodas.  Avoid bending over or lying down after meals.  Take a short walk after you eat.  If heartburn is a problem at night, do not eat for 2 hours before bedtime.  Take antacids like Mylanta, Maalox, Rolaids, or Tums. Do not take antacids that have sodium bicarbonate, magnesium trisilicate, or aspirin. Be careful when you take over-the-counter antacid medicines. Many of these medicines have aspirin in them. While you are pregnant, do not take aspirin or medicines that contain aspirin unless your doctor says it is okay.  If you're not getting relief, talk to your doctor. You may be able to take a stronger acid-reducing medicine.  When should you call for help?   Call your doctor now or seek immediate medical care if:    You have new or worse belly pain.     You are vomiting.   Watch closely for changes in your health, and be sure to contact your doctor if:    You have new or worse symptoms of reflux.     You are losing weight.     You have trouble or pain swallowing.     You do not get better as expected.   Where can you learn more?  Go to https://www.MyBeautyCompare.net/patiented  Enter U946 in the search box to learn more about \"Pregnancy and Heartburn: Care Instructions.\"  Current as of: April 30, 2024  Content Version: 14.4    3441-4583 [employer].   Care instructions adapted under license by your healthcare professional. If you have questions about a medical condition or this instruction, always ask your healthcare professional. [employer] disclaims any warranty or liability for your use of this information.    Constipation: Care Instructions  Overview     Constipation means that you have a hard time passing stools (bowel movements). People pass stools from 3 times a day to " once every 3 days. What is normal for you may be different. Constipation may occur with pain in the rectum and cramping. The pain may get worse when you try to pass stools. Sometimes there are small amounts of bright red blood on toilet paper or the surface of stools. This is because of enlarged veins near the rectum (hemorrhoids).  A few changes in your diet and lifestyle may help you avoid ongoing constipation. Your doctor may also prescribe medicine to help loosen your stool.  Some medicines can cause constipation. These include pain medicines and antidepressants. Tell your doctor about all the medicines you take. Your doctor may want to make a medicine change to ease your symptoms.  Follow-up care is a key part of your treatment and safety. Be sure to make and go to all appointments, and call your doctor if you are having problems. It's also a good idea to know your test results and keep a list of the medicines you take.  How can you care for yourself at home?  Drink plenty of fluids. If you have kidney, heart, or liver disease and have to limit fluids, talk with your doctor before you increase the amount of fluids you drink.  Include high-fiber foods in your diet each day. These include fruits, vegetables, beans, and whole grains.  Get at least 30 minutes of exercise on most days of the week. Walking is a good choice. You also may want to do other activities, such as running, swimming, cycling, or playing tennis or team sports.  Take a fiber supplement, such as Citrucel or Metamucil, every day. Read and follow all instructions on the label.  Schedule time each day for a bowel movement. A daily routine may help. Take your time having a bowel movement, but don't sit for more than 10 minutes at a time. And don't strain too much.  Support your feet with a small step stool when you sit on the toilet. This helps flex your hips and places your pelvis in a squatting position.  Your doctor may recommend an  "over-the-counter laxative to relieve your constipation. Examples are Milk of Magnesia and MiraLax. Read and follow all instructions on the label. Do not use laxatives on a long-term basis.  When should you call for help?   Call your doctor now or seek immediate medical care if:    You have new or worse belly pain.     You have new or worse nausea or vomiting.     You have blood in your stools.   Watch closely for changes in your health, and be sure to contact your doctor if:    Your constipation is getting worse.     You do not get better as expected.   Where can you learn more?  Go to https://www.GenieDB.net/patiented  Enter P343 in the search box to learn more about \"Constipation: Care Instructions.\"  Current as of: October 19, 2024  Content Version: 14.4    4574-1802 [employer].   Care instructions adapted under license by your healthcare professional. If you have questions about a medical condition or this instruction, always ask your healthcare professional. [employer] disclaims any warranty or liability for your use of this information.    Learning About High-Iron Foods  What foods are high in iron?     The foods you eat contain nutrients, such as vitamins and minerals. Iron is a nutrient. Your body needs the right amount to stay healthy and work as it should. You can use the list below to help you make choices about which foods to eat.  Here are some foods that contain iron. They have 1 to 2 milligrams of iron per serving.  Fruits  Figs (dried), 5 figs  Vegetables  Asparagus (canned), 6 grant  Brayan, beet, Swiss chard, or turnip greens, 1 cup  Dried peas, cooked,   cup  Seaweed, spirulina (dried),   cup  Spinach, (cooked)   cup or (raw) 1 cup  Grains  Cereals, fortified with iron, 1 cup  Grits (instant, cooked), fortified with iron,   cup  Meats and other protein foods  Beans (kidney, lima, navy, white), canned or cooked,   cup  Beef or lamb, 3 oz  Chicken giblets, 3 " "oz  Chickpeas (garbanzo beans),   cup  Liver of beef, lamb, or pork, 3 oz  Oysters (cooked), 3 oz  Sardines (canned), 3 oz  Soybeans (boiled),   cup  Tofu (firm),   cup  Work with your doctor to find out how much of this nutrient you need. Depending on your health, you may need more or less of it in your diet.  Where can you learn more?  Go to https://www.[employer].net/patiented  Enter R005 in the search box to learn more about \"Learning About High-Iron Foods.\"  Current as of: October 7, 2024  Content Version: 14.4    4217-4192 [employer].   Care instructions adapted under license by your healthcare professional. If you have questions about a medical condition or this instruction, always ask your healthcare professional. [employer] disclaims any warranty or liability for your use of this information.    Rh Antibodies Screening During Pregnancy: About This Test  What is it?     The Rh antibodies screening test is a blood test. It checks your blood for Rh antibodies. If you have Rh-negative blood and have been exposed to Rh-positive blood, your immune system may make antibodies to attack the Rh-positive blood. When a pregnant woman has these antibodies, it is called Rh sensitization.  Why is this test done?  The Rh antibodies screening test is done during pregnancy to find out if your baby is at risk for Rh disease. This can happen if you have Rh-negative blood and your baby has Rh-positive blood. If your Rh-negative blood mixes with Rh-positive blood, your immune system will make antibodies to attack the Rh-positive blood.  During pregnancy, these antibodies could attach to the baby's red blood cells. This can cause your baby to have serious health problems. The results of this test will help your doctor know how to best care for you and your baby during your pregnancy.  How do you prepare for the test?  In general, there's nothing you have to do before this test, unless your doctor tells you " "to.  How is the test done?  A health professional uses a needle to take a blood sample, usually from the arm.  What happens after the test?  You will probably be able to go home right away. It depends on the reason for the test.  You can go back to your usual activities right away.  Follow-up care is a key part of your treatment and safety. Be sure to make and go to all appointments, and call your doctor if you are having problems. It's also a good idea to keep a list of the medicines you take. Ask your doctor when you can expect to have your test results.  Where can you learn more?  Go to https://www.Omnilink Systems.net/patiented  Enter P722 in the search box to learn more about \"Rh Antibodies Screening During Pregnancy: About This Test.\"  Current as of: April 30, 2024  Content Version: 14.4    3705-8894 [employer].   Care instructions adapted under license by your healthcare professional. If you have questions about a medical condition or this instruction, always ask your healthcare professional. [employer] disclaims any warranty or liability for your use of this information.    Rubella (Setswana Measles): Care Instructions  Overview  Rubella, also called Setswana measles or 3-day measles, is a disease caused by a virus. It spreads by coughs, sneezes, and close contact. Rubella usually is mild and does not cause long-term problems. But if you are pregnant and get it, you can give the disease to your unborn baby. This can cause serious birth defects.  While you have rubella, you may get a rash and a mild fever, and the lymph glands in your neck may swell. Older children often have a fever, eye pain, a sore throat, and body aches. You can relieve most symptoms with care at home. Avoid being around others, especially pregnant people, until your rash has been gone for at least 4 days. People who have not had this disease before or have not had the vaccine have the greatest chance of getting the " "virus.  Follow-up care is a key part of your treatment and safety. Be sure to make and go to all appointments, and call your doctor if you are having problems. It's also a good idea to know your test results and keep a list of the medicines you take.  How can you care for yourself at home?  Drink plenty of fluids. If you have kidney, heart, or liver disease and have to limit fluids, talk with your doctor before you increase the amount of fluids you drink.  Get plenty of rest to help your body heal.  Take an over-the-counter pain medicine, such as acetaminophen (Tylenol), ibuprofen (Advil, Motrin), or naproxen (Aleve), to reduce fever and discomfort. Read and follow all instructions on the label. Do not give aspirin to anyone younger than 20. It has been linked to Reye syndrome, a serious illness.  Do not take two or more pain medicines at the same time unless the doctor told you to. Many pain medicines have acetaminophen, which is Tylenol. Too much acetaminophen (Tylenol) can be harmful.  Try not to scratch the rash. Put cold, wet cloths on the rash to reduce itching.  Do not smoke. Smoking can make your symptoms worse. If you need help quitting, talk to your doctor about stop-smoking programs and medicines. These can increase your chances of quitting for good.  Avoid contact with people who have never had rubella and who have not been immunized.  When should you call for help?   Call your doctor now or seek immediate medical care if:    You have a fever with a stiff neck or a severe headache.     You are sensitive to light or feel very sleepy or confused.   Watch closely for changes in your health, and be sure to contact your doctor if:    You do not get better as expected.   Where can you learn more?  Go to https://www.healthwise.net/patiented  Enter B812 in the search box to learn more about \"Rubella (Kiswahili Measles): Care Instructions.\"  Current as of: April 30, 2024  Content Version: 14.4 2024-2025 [employer].   Care instructions adapted under license by your healthcare professional. If you have questions about a medical condition or this instruction, always ask your healthcare professional. [facility] [employer] disclaims any warranty or liability for your use of this information.    Gonorrhea and Chlamydia: About These Tests  What is it?  These tests use a sample of urine or other body fluid to look for the bacteria that cause these sexually transmitted infections (STIs). The fluid sample can come from the cervix, vagina, rectum, throat, or eyes.  Why is this test done?  These tests may be done to:  Find out if symptoms are caused by gonorrhea or chlamydia.  Check people who are at high risk of being infected with gonorrhea or chlamydia.  Retest people several months after they have been treated for gonorrhea or chlamydia.  Check for infection in your  if you had a gonorrhea or chlamydia infection at the time of delivery.  How can you prepare for the test?  If you are going to have a urine test, do not urinate for at least 1 hour before the test.  If you think you may have chlamydia or gonorrhea, don't have sexual intercourse until you get your test results. And you may want to have tests for other STIs, such as HIV.  How is the test done?  For a direct sample, a swab is used to collect body fluid from the cervix, vagina, rectum, throat, or eyes. Your doctor may collect the sample. Or you may be given instructions on how to collect your own sample.  For a urine sample, you will collect the urine that comes out when you first start to urinate. Don't wipe the genital area clean before you urinate.  How long does the test take?  The test will take a few minutes.  What happens after the test?  You will be able to go home right away.  You can go back to your usual activities right away.  If you do have an infection, don't have sexual intercourse for 7 days after you start treatment. And your sex  "partner(s) should also be treated.  Follow-up care is a key part of your treatment and safety. Be sure to make and go to all appointments, and call your doctor if you are having problems. It's also a good idea to keep a list of the medicines you take. Ask your doctor when you can expect to have your test results.  Where can you learn more?  Go to https://www.[employer].net/patiented  Enter K976 in the search box to learn more about \"Gonorrhea and Chlamydia: About These Tests.\"  Current as of: April 30, 2024  Content Version: 14.4    4500-1113 [employer].   Care instructions adapted under license by your healthcare professional. If you have questions about a medical condition or this instruction, always ask your healthcare professional. [employer] disclaims any warranty or liability for your use of this information.    Trichomoniasis: About This Test  What is it?     This test uses a sample of urine or other body fluid to look for the tiny parasite that causes trichomoniasis (also called trich). The fluid sample can come from the vagina, cervix, or urethra. Your doctor may choose to use one or more of many available tests.  Why is it done?  A trich test may be done to:  Find out if symptoms are caused by trich.  Check people who are at high risk for being infected with trich.  Check after treatment to make sure that the infection is gone.  How do you prepare for the test?  If you are going to have a urine test, do not urinate for at least 1 hour before the test.  How is the test done?  For a direct sample, a swab is used to collect body fluid from the cervix, vagina, or urethra. Your doctor may collect the sample. Or you may be given instructions on how to collect your own sample.  For a urine sample, you will collect the urine that comes out when you first start to urinate. Don't wipe the area clean before you urinate.  How long does the test take?  It will take a few minutes to collect a " sample.  What happens after the test?  You can go home right away.  You can go back to your usual activities right away.  You may get the test results the same day or several days later. It depends on the test used.  If you do have an infection, don't have sexual intercourse for 7 days after you start treatment. Your sex partner or partners should also be treated.  Follow-up care is a key part of your treatment and safety. Be sure to make and go to all appointments, and call your doctor if you are having problems. Ask your doctor when you can expect to have your test results.  Current as of: April 30, 2024  Content Version: 14.4    1457-3145 [employer].   Care instructions adapted under license by your healthcare professional. If you have questions about a medical condition or this instruction, always ask your healthcare professional. [employer] disclaims any warranty or liability for your use of this information.    HIV Testing: Care Instructions  Overview  You can get tested for the human immunodeficiency virus (HIV). Most doctors use a blood test to check for HIV antibodies and antigens in your blood. It may also check for the genetic material (RNA) of HIV. Some tests use saliva to check for HIV antibodies. But these aren't as accurate. For example, they may give a false result if you've just been infected.  What do the results mean?        Normal (negative)   No HIV antibodies, antigens, or RNA were found.  You may need more testing. It can make sure your test results are correct.        Uncertain (indeterminate)   Test results didn't clearly show if you have an HIV infection.  HIV antibodies or antigens may not have formed yet.  Some other type of antibody or antigen may have affected the results.  You will need another test to be sure.        Abnormal (positive)   HIV antibodies, antigens, or RNA were found.  If you haven't had an RNA test yet, one will be done. If it's positive, you  "have HIV.  If your test result is positive, your doctor will talk to you. You will discuss starting treatment.  Follow-up care is a key part of your treatment and safety. Be sure to make and go to all appointments, and call your doctor if you are having problems. It's also a good idea to know your test results and keep a list of the medicines you take.  Where can you learn more?  Go to https://www.[employer].net/patiented  Enter T792 in the search box to learn more about \"HIV Testing: Care Instructions.\"  Current as of: April 30, 2024  Content Version: 14.4    9660-6277 [employer].   Care instructions adapted under license by your healthcare professional. If you have questions about a medical condition or this instruction, always ask your healthcare professional. [employer] disclaims any warranty or liability for your use of this information.    Hepatitis C Virus Tests: About These Tests  What are they?     Hepatitis C virus tests are blood tests that check for substances in the blood that show whether you have hepatitis C now or had it in the past. The tests can also tell you what type of hepatitis C you have and how severe the disease is. This can help your doctor with treatment.  If the tests show that you have long-term hepatitis C, you need to take steps to prevent spreading the disease.  Why are these tests done?  You may need these tests if:  You have symptoms of hepatitis.  You may have been exposed to the virus. You are more likely to have been exposed to the virus if you inject drugs or are exposed to body fluids (such as if you are a health care worker).  You've had other tests that show you have liver problems.  You are 18 to 79 years old.  You have an HIV infection.  The tests also are done to help your doctor decide about your treatment and see how well it works.  How do you prepare for the test?  In general, there's nothing you have to do before this test, unless your doctor " "tells you to.  How is the test done?  A health professional uses a needle to take a blood sample, usually from the arm.  What happens after these tests?  You will probably be able to go home right away.  You can go back to your usual activities right away.  Follow-up care is a key part of your treatment and safety. Be sure to make and go to all appointments, and call your doctor if you are having problems. It's also a good idea to keep a list of the medicines you take. Ask your doctor when you can expect to have your test results.  Where can you learn more?  Go to https://www.[employer]us.Routeware/patiented  Enter W551 in the search box to learn more about \"Hepatitis C Virus Tests: About These Tests.\"  Current as of: April 30, 2024  Content Version: 14.4    6886-8101 [employer].   Care instructions adapted under license by your healthcare professional. If you have questions about a medical condition or this instruction, always ask your healthcare professional. [employer] disclaims any warranty or liability for your use of this information.    Learning About Fetal Ultrasound Results  What is a fetal ultrasound?     Fetal ultrasound is a test that lets your doctor see an image of your baby. Your doctor learns information about your baby from this picture. You may find out, for example, if you are having a boy or a girl. But the main reason you have this test is to get information about your baby's health.  (You may hear your baby called a fetus. This is a common medical term for a baby that's growing in the mother's uterus.)  What kind of information can you learn from this test?  The findings of an ultrasound fall into two categories, normal and abnormal.  Normal  The fetus is the right size for its age.  The placenta is the expected size and does not cover the cervix.  There is enough amniotic fluid in the uterus.  No birth defects can be seen.  Abnormal  The fetus is small or large for its " age.  The placenta covers the cervix.  There is too much or too little amniotic fluid in the uterus.  The fetus may have a birth defect.  What does an abnormal result mean?  Abnormal seems to imply that something is wrong with your baby. But what it means is that the test has shown something the doctor wants to take a closer look at.  And that's what happens next. Your doctor will talk to you about what further test or tests you may need.  What do the results mean?  Some of the things your doctor may see on an abnormal ultrasound include:  Echogenic bowel.  The bowel looks very bright on the screen. This could mean that there's blood in the bowel. Or it could mean that something is blocking the small bowel.  Increased nuchal translucency.  The ultrasound measures the thickness at the back of the baby's neck. An increase in thickness is sometimes an early sign of Down syndrome.  Increased or decreased amniotic fluid.  The doctor will look for a reason for the level of amniotic fluid and will watch the pregnancy closely as it progresses.  Large ventricles.  Ventricles in the brain look larger than they should. Your doctor may take a closer look at the brain.  Renal pyelectasis/hydronephrosis.  The ultrasound measures the fluid around the kidney. If there is more fluid than expected, there is a chance of urinary tract or kidney problems.  Short long bones.  The ultrasound measures certain arm and leg bones. A long bone (humerus or femur) that is shorter than average could be a sign of Down syndrome.  Subchorionic hemorrhage.  An ultrasound can show bleeding under one of the membranes that surrounds the fetus. Some women don't have symptoms of bleeding. The ultrasound can find this problem when women are not bleeding from their vagina. Women who have this condition have a slightly higher chance of miscarriage.  What do you do now?  Take a deep breath, and let it out. Keep in mind that an abnormal finding on an  "ultrasound, after it's coupled with more information, may:  Turn out to be nothing.  Turn out to be something mild that won't affect the baby.  Turn out to be something more serious. But if this happens, early diagnosis helps you and your doctor plan treatment options sooner rather than later.  Your medical team is there for you. So are your family and friends. Ask questions, and get the help and support you need.  Follow-up care is a key part of your treatment and safety. Be sure to make and go to all appointments, and call your doctor if you are having problems. It's also a good idea to know your test results and keep a list of the medicines you take.  Where can you learn more?  Go to https://www.[employer].net/patiented  Enter K451 in the search box to learn more about \"Learning About Fetal Ultrasound Results.\"  Current as of: April 30, 2024  Content Version: 14.4    9789-6216 [employer].   Care instructions adapted under license by your healthcare professional. If you have questions about a medical condition or this instruction, always ask your healthcare professional. [employer] disclaims any warranty or liability for your use of this information.    Learning About Prenatal Visits  Overview     Regular prenatal visits are very important during any pregnancy. These quick office visits may seem simple and routine. But they can help you have a safe and healthy pregnancy. Your doctor is watching for problems that can only be found through regular checkups. The visits also give you and your doctor time to build a good relationship.  After your first visit, you will most likely start on a schedule of monthly visits. In your third trimester, the visits will get more frequent. Based on your health, your age, and if you've had a normal, full-term pregnancy before, your doctor may want to see you more or less often.  At different times in your pregnancy, you will have exams and tests. Some are " routine. Others are done only when there is a chance of a problem. Everything healthy you do for your body helps you have a healthy pregnancy. Rest when you need it. Eat well, drink plenty of water, and exercise regularly.  What happens during a prenatal visit?  You will have blood pressure checks, along with urine tests. You also may have blood tests. If you need to go to the bathroom while waiting for the doctor, tell the nurse. You will be given a sample cup so your urine can be tested.  You will be weighed and have your belly measured.  Your doctor may listen to the fetal heartbeat with a special device.  At about 24 weeks, and possibly earlier in your pregnancy, your doctor will check your blood sugar (glucose tolerance test) for diabetes that can occur during pregnancy. This is gestational diabetes, which can be harmful.  You will have tests to check for infections that could harm your . These include group B streptococcus and hepatitis B.  Your doctor may do ultrasounds to check for problems. This also checks the position of the fetus. An ultrasound uses sound waves to produce a picture of the fetus.  You may get your vaccines updated.  Your doctor may ask you questions to check for signs of anxiety or depression. Tell your doctor if you feel sad, anxious, or hopeless for more than a few days.  You may have other tests at any time during your pregnancy.  Use your visits to discuss with your doctor any concerns you have.  How can you care for yourself at home?  Get plenty of rest.  Try to exercise every day, if your doctor says it is okay. If you have not exercised in the past, start out slowly. For example, you can take short walks each day.  Choose healthy foods, such as fruits, vegetables, whole grains, lean proteins, low-fat dairy, and healthy fats.  Drink plenty of fluids. Cut down on drinks with caffeine, such as coffee, tea, and cola. If you have kidney, heart, or liver disease and have to limit  "fluids, talk with your doctor before you increase the amount of fluids you drink.  Try to avoid chemical fumes, paint fumes, and poisons.  If you smoke, vape, or use alcohol, marijuana, or other drugs, quit or cut back as much as you can. Talk to your doctor if you need help quitting.  Review all of your medicines, including over-the-counter medicines and supplements, with your doctor. Some of your routine medicines may need to be changed. Do not stop or start taking any medicines without talking to your doctor first.  Follow-up care is a key part of your treatment and safety. Be sure to make and go to all appointments, and call your doctor if you are having problems. It's also a good idea to know your test results and keep a list of the medicines you take.  Where can you learn more?  Go to https://www.ZAOZAO.net/patiented  Enter J502 in the search box to learn more about \"Learning About Prenatal Visits.\"  Current as of: April 30, 2024  Content Version: 14.4    1180-9184 [employer].   Care instructions adapted under license by your healthcare professional. If you have questions about a medical condition or this instruction, always ask your healthcare professional. [employer] disclaims any warranty or liability for your use of this information.    Intimate Partner Violence: Care Instructions  Overview     If you want to save this information but don't think it is safe to take it home, see if a trusted friend can keep it for you. Plan ahead. Know who you can call for help, and memorize the phone number.   Be careful online too. Your online activity may be seen by others. Do not use your personal computer or device to read about this topic. Use a safe computer, such as one at work, a friend's home, or a library.    Intimate partner violence--a type of domestic abuse--is different from an argument now and then. It is a pattern of abuse that one person may use to control another person's " behavior. It may start with threats and name-calling. Then, it may lead to more serious acts, like pushing and slapping. The abuse also may occur in other areas. For example, the abuser may withhold money or spend a partner's money without their knowledge.  Abuse can cause serious harm. You are more likely to have a long-term health problem from the injuries and stress of living in a violent relationship. People who are sexually abused by their partners have more sexually transmitted infections and unplanned pregnancies. Anyone who is abused also faces emotional pain. Anyone can be abused in relationships. In some relationships, both people use abusive behavior.  If you are pregnant, abuse can cause problems such as poor weight gain, infections, and bleeding. Abuse during this time may increase your baby's risk of low birth weight, premature birth, and death.  Follow-up care is a key part of your treatment and safety. Be sure to make and go to all appointments, and call your doctor if you are having problems. It's also a good idea to know your test results and keep a list of the medicines you take.  How can you care for yourself at home?  If you do not have a safe place to stay, discuss this with your doctor before you leave.  Have a plan for where to go, how to leave your home, and where to stay in case of an emergency. Do not tell your partner about your plan. Contact:  The National Domestic Violence Hotline toll-free at 1-344.294.7097. They can help you find resources in your area.  Your local police department, hospital, or clinic for information about shelters and safe homes near you.  Talk to a trusted friend or neighbor, a counselor, or a dominick leader. Do not feel that you have to hide what happened.  Teach your children how to call for help in an emergency.  Be alert to warning signs, such as threats, heavy alcohol use, or drug use. This can help you avoid danger.  If you can, make sure that there are no  "guns or other weapons in your home.  When should you call for help?   Call 911 anytime you think you may need emergency care. For example, call if:    You or someone else has just been abused.     You think you or someone else is in danger of being abused.   Watch closely for changes in your health, and be sure to contact your doctor if you have any problems.  Where can you learn more?  Go to https://www.Circuport.net/patiented  Enter G282 in the search box to learn more about \"Intimate Partner Violence: Care Instructions.\"  Current as of: July 31, 2024  Content Version: 14.4    6279-6086 [employer].   Care instructions adapted under license by your healthcare professional. If you have questions about a medical condition or this instruction, always ask your healthcare professional. [employer] disclaims any warranty or liability for your use of this information.    Intimate Partner Violence Safety Instructions: Care Instructions  Overview     If you want to save this information but don't think it is safe to take it home, see if a trusted friend can keep it for you. Plan ahead. Know who you can call for help, and memorize the phone number.   Be careful online too. Your online activity may be seen by others. Do not use your personal computer or device to read about this topic. Use a safe computer, such as one at work, a friend's home, or a library.    When you are abused by a spouse or partner, you can take actions to protect yourself and your children.  You can increase your safety whether you decide to stay with your spouse or partner or you decide to leave. You may want to make a safety plan and pack a bag ahead of time. This will help you leave quickly when you decide to. Remember, you cannot change your partner's actions, but you can find help for you and your children. No one deserves to be abused.  Follow-up care is a key part of your treatment and safety. Be sure to make and go to all " appointments, and call your doctor if you are having problems. It's also a good idea to know your test results and keep a list of the medicines you take.  How can you care for yourself at home?  Make a plan for your safety   If you decide to stay with your abusive spouse or partner, you can do the following to increase your safety:  Decide what works best to keep you safe in an emergency.  Know who you can call to help you in an emergency.  Decide if you will call the police if you get hurt again. If you can, agree on a signal with your children or neighbor to call the police for you if you need help. You can flash lights or hang something out of a window.  Choose a safe place to go for a short time if you need to leave home. Memorize the address and phone number.  Learn escape routes out of your home in case you need to leave in a hurry. Teach your children different ways to get out of your home quickly if they need to.  If you can, hide or lock up things that can be used as weapons (guns, knives, hammers).  Learn the number of a domestic violence shelter. Talk to the people there about how they can help.  Find out about other community resources that can help you.  Take pictures of bruises or other injuries if you can. You can also take pictures of things your abuser has broken.  Teach your children that violence is never okay. Tell them that they do not deserve to be hurt.  Pack a bag   Prepare a bag with things you will need if you leave suddenly. Leave it with a friend, a relative, or someone else you trust. You could include the following items in the bag:  A set of keys to your home and car.  Emergency phone numbers and addresses.  Money such as cash or checks. You can also ask a friend, a relative, or someone else you trust to hold money for you.  Copies of legal documents such as house and car titles or rent receipts, birth certificates, Social Security card, voter registration, marriage and 's  "licenses, and your children's health records.  Personal items you would need for a few days, such as clothes, a toothbrush, toothpaste, and any medicines you or your children need.  A favorite toy or book for your child or children.  Diapers and bottles, if you have very young children.  Pictures that show signs of abuse and violence. You may also add pictures of your abuser.  If you leave   If you decide to leave, you can take the following steps:  Go to the emergency room at a hospital if you have been hurt.  Think about asking the police to be with you as you leave. They can protect you as you leave your home.  If you decide to leave secretly, remember that activities can be tracked. Your abuser may still have access to your cell phone, email, and credit cards. It may be possible for these to be traced. Always be aware of your surroundings.  If this is an emergency, do not worry about gathering up anything. Just leave--your safety is most important.  If your abuser moves out, change the locks on the doors. If you have a security system, change the access code.  When should you call for help?   Call 911 anytime you think you may need emergency care. For example, call if:    You or someone else has just been abused.     You think you or someone else is in danger of being abused.   Watch closely for changes in your health, and be sure to contact your doctor if you have any problems.  Where can you learn more?  Go to https://www.Wanderfly.net/patiented  Enter A752 in the search box to learn more about \"Intimate Partner Violence Safety Instructions: Care Instructions.\"  Current as of: July 31, 2024  Content Version: 14.4    6950-4570 [employer].   Care instructions adapted under license by your healthcare professional. If you have questions about a medical condition or this instruction, always ask your healthcare professional. [employer] disclaims any warranty or liability for your use of this " information.    Learning About Intimate Partner Violence  What is intimate partner violence?  Intimate partner violence is a type of domestic abuse. It's threatening, emotionally harmful, or violent behavior in a personal relationship. It can happen between past or current partners or spouses. In some relationships both people abuse each other. One partner may be more abusive. Or the abuse may be equal.  Abuse can affect people of any ethnic group, race, or Hinduism. It can affect teens, adults, or the elderly. And it can happen to people of any sexual orientation, gender, or social status.  Abusers use fear, bullying, and threats to control their partners. They may control what their partners do. They may control where their partners go or who they see. They may act jealous, controlling, or possessive. These early signs of abuse may happen soon after the start of the relationship. Sometimes it can be hard to notice abuse at first. But after the relationship becomes more serious, the abuse may get worse.  If you are being abused in your relationship, it's important to get help. The abuse is not your fault. You don't have to face it alone.  Be careful  It may not be safe to take home domestic abuse information like this handout. Some people ask a trusted friend to keep it for them. It's also important to plan ahead and to memorize the phone number of places you can go for help. If you are concerned about your safety, do not use your computer, smartphone, or tablet to read about domestic abuse.   What are the types of intimate partner violence?  Abuse can happen in different ways. Each type can happen on its own or in combination with others.  Emotional abuse  Emotional abuse is a pattern of threats, insults, or controlling behavior. It includes verbal abuse. It goes beyond healthy disagreements in a relationship. It's a sign of an unhealthy relationship.  Do you feel threatened, intimidated, or controlled?  Does your  partner:  Threaten your children, other family members, or pets?  Use jokes meant to embarrass or shame you?  Call you names?  Tell you that you are a bad parent?  Threaten to take away your children?  Threaten to have you or your family members deported?  Control your access to money or other basic needs?  Control what you do, who you see or talk to, or where you go?  Another form of emotional abuse is denying that it is happening. Or the abuser may act like the abuse is no big deal or is your fault.  Sexual abuse  With sexual abuse, abusers may try to convince or force you to have sex. They may force you into sex acts you're not comfortable with. Or they may sexually assault you. Sexual abuse can happen even if you are in a committed relationship.  Physical abuse  Physical abuse means that a partner hits, kicks, or does something else to physically hurt you. Physical abuse that starts with a slap might lead to kicking, shoving, and choking over time. The abuser may also threaten to hurt or kill you.  Stalking  Stalking means that an abuser gives you attention that you do not want and that causes you fear. Examples of stalking include:  Following you.  Showing up at places where the abuser isn't invited, such as at your work or school.  Constantly calling or texting you.  What problems can  to?  Intimate partner violence can be very dangerous. It can cause serious, repeated injury. It can even lead to death.  All forms of abuse can cause long-term health problems from the stress of a violent relationship. Verbal abuse can lead to sexual and physical abuse.  Abuse causes:  Emotional pain.  Depression.  Anxiety.  Post-traumatic stress.  Sexual abuse can lead to sexually transmitted infections (such as HIV/AIDS) and unplanned pregnancy.  Pregnancy can be a very dangerous time for people in abusive relationships. Abuse can cause or increase the risk of problems during pregnancy. These include low weight gain,  "anemia, infections, and bleeding. Abuse may also increase your baby's risk of low birth weight, premature birth, and death.  It can be hard for some victims of abuse to ask for help or to leave their relationship. You may feel scared, stuck, or not sure what steps to take. But it's important not to ignore abuse. Talking to someone you trust could be the first step to ending the abuse and taking care of your own health and happiness again. There are resources available that can help keep you safe.  Where can you get help?  Talk to a trusted friend. Find a local advocacy group, or talk to your doctor about the abuse.  Contact the National Domestic Violence Hotline at 1-095-478-SOVL (1-550.985.8020) for more safety tips. They can guide you to groups in your area that can help. Or go to the National Coalition Against Domestic Violence website at www.[employer].org to learn more.  Domestic violence groups or a counselor in your area can help you make a safety plan for yourself and your children.  When to call for help  Call 911 anytime you think you may need emergency care. For example, call if:  You think that you or someone you know is in danger of being abused.  You have been hurt and can't have someone safely take you to emergency care.  You have just been abused.  A family member has just been abused.  Where can you learn more?  Go to https://www.[employer].net/patiented  Enter S665 in the search box to learn more about \"Learning About Intimate Partner Violence.\"  Current as of: July 31, 2024  Content Version: 14.4    2359-4340 [employer].   Care instructions adapted under license by your healthcare professional. If you have questions about a medical condition or this instruction, always ask your healthcare professional. [employer] disclaims any warranty or liability for your use of this information.    Vaginal Bleeding During Pregnancy: Care Instructions  Overview     It's common to have " some vaginal spotting when you are pregnant. In some cases, the bleeding isn't serious. And there aren't any more problems with the pregnancy.  But sometimes bleeding is a sign of a more serious problem. This is more common if the bleeding is heavy or painful. Examples of more serious problems include miscarriage, an ectopic pregnancy, and a problem with the placenta.  You may have to see your doctor again to be sure everything is okay. You may also need more tests to find the cause of the bleeding.  Home treatment may be all you need. But it depends on what is causing the bleeding. Be sure to tell your doctor if you have any new symptoms or if your symptoms get worse.  The doctor has checked you carefully, but problems can develop later. If you notice any problems or new symptoms, get medical treatment right away.  Follow-up care is a key part of your treatment and safety. Be sure to make and go to all appointments, and call your doctor if you are having problems. It's also a good idea to know your test results and keep a list of the medicines you take.  How can you care for yourself at home?  If your doctor prescribed medicines, take them exactly as directed. Call your doctor if you think you are having a problem with your medicine.  Do not have vaginal sex until your doctor says it's okay.  Do not put anything in your vagina until your doctor says it's okay.  Ask your doctor about other activities you can or can't do.  Get a lot of rest. Being pregnant can make you tired.  Do not use nonsteroidal anti-inflammatory drugs (NSAIDs), such as ibuprofen (Advil, Motrin), naproxen (Aleve), or aspirin, unless your doctor says it is okay.  When should you call for help?   Call 911 anytime you think you may need emergency care. For example, call if:    You passed out (lost consciousness).     You have severe vaginal bleeding. This means you are soaking through a pad each hour for 2 or more hours.     You have sudden, severe  pain in your belly or pelvis.   Call your doctor now or seek immediate medical care if:    You have new or worse vaginal bleeding.     You are dizzy or lightheaded, or you feel like you may faint.     You have pain in your belly, pelvis, or lower back.     You think that you are in labor.     You have a sudden release of fluid from your vagina.     You've been having regular contractions for an hour. This means that you've had at least 8 contractions within 1 hour or at least 4 contractions within 20 minutes, even after you change your position and drink fluids.     You notice that your baby has stopped moving or is moving much less than normal.   Watch closely for changes in your health, and be sure to contact your doctor if you have any problems.  Current as of: April 30, 2024  Content Version: 14.4    6255-1472 [employer].   Care instructions adapted under license by your healthcare professional. If you have questions about a medical condition or this instruction, always ask your healthcare professional. [employer] disclaims any warranty or liability for your use of this information.

## 2025-05-21 ENCOUNTER — LAB (OUTPATIENT)
Dept: LAB | Facility: CLINIC | Age: 35
End: 2025-05-21
Payer: COMMERCIAL

## 2025-05-21 DIAGNOSIS — O09.299 HISTORY OF MISCARRIAGE, CURRENTLY PREGNANT: ICD-10-CM

## 2025-05-21 DIAGNOSIS — E03.8 SUBCLINICAL HYPOTHYROIDISM: ICD-10-CM

## 2025-05-21 PROCEDURE — 36415 COLL VENOUS BLD VENIPUNCTURE: CPT

## 2025-05-21 PROCEDURE — 84439 ASSAY OF FREE THYROXINE: CPT

## 2025-05-21 PROCEDURE — 84702 CHORIONIC GONADOTROPIN TEST: CPT

## 2025-05-21 PROCEDURE — 84443 ASSAY THYROID STIM HORMONE: CPT

## 2025-05-21 RX ORDER — SERTRALINE HYDROCHLORIDE 25 MG/1
25 TABLET, FILM COATED ORAL DAILY
Qty: 90 TABLET | Refills: 0 | Status: SHIPPED | OUTPATIENT
Start: 2025-05-21

## 2025-05-22 LAB
HCG INTACT+B SERPL-ACNC: 2837 MIU/ML
T4 FREE SERPL-MCNC: 1.31 NG/DL (ref 0.9–1.7)
TSH SERPL DL<=0.005 MIU/L-ACNC: 2.28 UIU/ML (ref 0.3–4.2)

## 2025-05-23 ENCOUNTER — RESULTS FOLLOW-UP (OUTPATIENT)
Dept: OBGYN | Facility: CLINIC | Age: 35
End: 2025-05-23

## 2025-06-02 PROBLEM — R59.1 LYMPHADENOPATHY: Status: ACTIVE | Noted: 2025-06-02

## 2025-06-02 PROBLEM — Z83.719 FHX: COLONIC POLYPS: Status: ACTIVE | Noted: 2025-06-02

## 2025-06-02 PROBLEM — R79.89 ELEVATED LFTS: Status: ACTIVE | Noted: 2025-06-02

## 2025-06-02 RX ORDER — PROPRANOLOL HYDROCHLORIDE 10 MG/1
TABLET ORAL
COMMUNITY
Start: 2024-04-05

## 2025-06-02 RX ORDER — B-COMPLEX WITH VITAMIN C
TABLET ORAL
COMMUNITY

## 2025-06-02 NOTE — PROGRESS NOTES
SUBJECTIVE:                                                   Nory Woods is a 34 year old female who presents to clinic today for the following health issue(s):  Patient presents with:  Follow Up: US      Additional information:     HPI:  Patient has a history of irregular menses and so based on her 4/1 LMP should be 9+3 or so.  However she knows that that isn't the case and knows when her DOC and ovulation were and so based on that she should be 7+3 today    Had a chemical pregnancy in April which is essentially what her LMP is. Her period was 3-4 days later than expected. +UPT at home a few days prior and then had a period that was heavier and a little longer. So no intervening period. She also had a 10+6 by sure dates MAB 12/21 and did measure a fetal pole at 8+2 w/o fhts then and needed D&C  However 3/23 had an uncomplicated 41+2 delivery, by C/S of her son    Was very sick with her first miscarriage up until the D&C even when stopped progressing and very sick with her son.  This time was very sick for about one week after finding out and then it got milder and feels like even as recent as the last few days has almost no nausea at all so doesn't have a good feeling about this pregnancy    She has a h.o subclinical hypothyroidism as well and when first had her chemical preg SAB, on 4/9 so a week later we did a TSH and it was 1.88. she only ever took 25mcg during her first preg and then TFTs normal after/since so no meds now. She then called back and wanted to repeat TFTs about 6 weeks after the initial check once found out she was pregnant and TSH was 2.28 with normal ft4 and was concerned that in such a short time in increased a lot compared to the 1.88 and wondering if that is concerning.    Then did serial HCGs with this preg and appropriate doubling early on.    U/S done today and shows 6+2 based on CRL but FHTs only 92 bpm      Patient's last menstrual period was 04/01/2025 (within weeks)..      Patient is sexually active, .  Using none for contraception.    reports that she has never smoked. She has never used smokeless tobacco.    STD testing offered?  Declined    Health maintenance updated:  yes    Today's PHQ-2 Score:       2024    10:30 AM   PHQ-2 (  [employer])   Q1: Little interest or pleasure in doing things 0   Q2: Feeling down, depressed or hopeless 0   PHQ-2 Score 0     Today's PHQ-9 Score:       2025     2:03 PM   PHQ-9 SCORE   PHQ-9 Total Score MyChart 0   PHQ-9 Total Score 0        Patient-reported     Today's DORCAS-7 Score:       2025     2:03 PM   DORCAS-7 SCORE   Total Score 1 (minimal anxiety)   Total Score 1        Patient-reported       Problem list and histories reviewed & adjusted, as indicated.  Additional history: as documented.    Patient Active Problem List   Diagnosis    Hypothyroidism    PCOS (polycystic ovarian syndrome)    Miscarriage    Anxiety    Supervision of high-risk pregnancy    Lymphadenopathy    FHx: colonic polyps    Elevated LFTs     Past Surgical History:   Procedure Laterality Date     SECTION N/A 3/25/2023    Procedure:  section;  Surgeon: January Lorenzo MD;  Location:  L+D    DILATION AND CURETTAGE SUCTION N/A 2021    Procedure: DILATION AND CURETTAGE, UTERUS, USING SUCTION;  Surgeon: Radha Iraheta MD;  Location:  OR    WISDOM TOOTH EXTRACTION        Social History     Tobacco Use    Smoking status: Never    Smokeless tobacco: Never   Substance Use Topics    Alcohol use: Not Currently      Problem (# of Occurrences) Relation (Name,Age of Onset)    Alzheimer Disease (1) Paternal Grandmother    Hypertension (1) Maternal Grandfather    Myocardial Infarction (1) Maternal Grandfather    Endometriosis (1) Mother    Pacemaker (1) Paternal Grandfather              Current Outpatient Medications   Medication Sig Dispense Refill    Prenatal Multivit-Min-Fe-FA (PRENATAL, W/IRON & FA,) 27-0.8 MG TABS Take by mouth.       Prenatal-FeFum-FA-DHA w/o A (PRENATAL + DHA PO) Take by mouth.      magnesium 250 MG tablet Take 1 tablet by mouth daily. (Patient not taking: Reported on 6/6/2025)      propranolol (INDERAL) 10 MG tablet Take by mouth. (Patient not taking: Reported on 6/6/2025)      sertraline (ZOLOFT) 25 MG tablet Take 1 tablet (25 mg) by mouth daily. (Patient not taking: Reported on 6/6/2025) 90 tablet 0     No current facility-administered medications for this visit.     No Known Allergies      OBJECTIVE:     BP 96/60   Wt 63.4 kg (139 lb 12.8 oz)   LMP 04/01/2025 (Within Weeks)   BMI 21.90 kg/m    Body mass index is 21.9 kg/m .    Exam:  Constitutional:  Appearance: Well nourished, well developed alert, in no acute distress     In-Clinic Test Results:  No results found for this or any previous visit (from the past 24 hours).      ASSESSMENT/PLAN:                                                        ICD-10-CM    1. Pregnancy with uncertain fetal viability, single or unspecified fetus  O36.80X0 US OB <14 Weeks w Transvaginal Single      2. History of miscarriage, currently pregnant, first trimester  O09.291 US OB <14 Weeks w Transvaginal Single     CBC with platelets     ABO/Rh type and screen      3. History of recurrent miscarriages  N96 Factor 2 and 5 mutation analysis     Lupus Anticoagulant Panel            Patient with early pregnancy that by known DOC/ovulation is not consistent with LMP and already knew that  Based on those dates she is 7+3 today.    B/c of 2 miscarriages so far, though term and uncomplicated deivery in between them, we did serial HCGs that doubled appropriately however is now showing a week lag in her CRL from her sure dating    She is right at the earliest 6 weeks GA where can be right on the cusp of slow FHTs increasing dramatically and pregnancy progressing but of course FHTs os 92 and a week lag, and her pregnancy sx of nausea, etc being so much milder and now basically gone.    Still has an  early viable pregnancy so will have her repeat U/S in one week    Has had no bleeding or cramping other than for a day or two right at what would have been implantation  Reviewed what could occur if another AB and were to miscarry on her own prior to repeat imaging and f/up appointment on 6/13 and what sx to contact us for or go to the ER for    Once repeat imaging is done we should have more definitive info.  If not viable then can discuss D&C vs mife/miso or waiting for spont    Also discussed that 1.88 to 2.28 is not a significant change in her TSH and is still totally normal and other than profound hypo or hyperthyroid this is not the cause of her miscarriages in the past, nor needing meds now to help support preg    Also discussed progesterone supplementation and the data that she's read about this and how this is outdated info and a healthy follicle makes enough progesterone and supplementing isn't indicated nor helpful outside of IVF pregnancy support    However if does have a 3rd miscarriage, especially the two that would be more than just a chemical pregnancy, there may be benefit in doing hypercoag w/up for APL syndrome and other coagulopathies  Discussed karyotyping of a miscarriage if D&C done and potentially for her or Dejan if a balance translocation is in place, though this is very unlikely and rare    Some of the hypercoag w/up is effected by pregnancy and typically wait until not pregnant but can certainly do the testing when comes back on 6/13 and always repeat anything that may need to be repeated at a later time.    Answered all of their questions, offered support for what is a very hard experience and now in a grey zone waiting for another week on a repeat U/S but reassured that there is nothing she has done or isn't doing but should be that has led to any of this    50  minutes spent on direct management of the patient's medical issue(s) as above, including chart review and chart completion, on  the same DOS      Radha Iraheta MD  [facility] [place]

## 2025-06-05 ENCOUNTER — ANCILLARY PROCEDURE (OUTPATIENT)
Dept: ULTRASOUND IMAGING | Facility: CLINIC | Age: 35
End: 2025-06-05
Attending: OBSTETRICS & GYNECOLOGY
Payer: COMMERCIAL

## 2025-06-05 DIAGNOSIS — O09.299 HISTORY OF MISCARRIAGE, CURRENTLY PREGNANT: ICD-10-CM

## 2025-06-05 PROCEDURE — 76817 TRANSVAGINAL US OBSTETRIC: CPT | Performed by: OBSTETRICS & GYNECOLOGY

## 2025-06-06 ENCOUNTER — OFFICE VISIT (OUTPATIENT)
Dept: OBGYN | Facility: CLINIC | Age: 35
End: 2025-06-06
Attending: OBSTETRICS & GYNECOLOGY
Payer: COMMERCIAL

## 2025-06-06 VITALS — BODY MASS INDEX: 21.9 KG/M2 | WEIGHT: 139.8 LBS | DIASTOLIC BLOOD PRESSURE: 60 MMHG | SYSTOLIC BLOOD PRESSURE: 96 MMHG

## 2025-06-06 DIAGNOSIS — N96 HISTORY OF RECURRENT MISCARRIAGES: ICD-10-CM

## 2025-06-06 DIAGNOSIS — O09.291 HISTORY OF MISCARRIAGE, CURRENTLY PREGNANT, FIRST TRIMESTER: ICD-10-CM

## 2025-06-06 DIAGNOSIS — O36.80X0 PREGNANCY WITH UNCERTAIN FETAL VIABILITY, SINGLE OR UNSPECIFIED FETUS: Primary | ICD-10-CM

## 2025-06-06 PROCEDURE — 99215 OFFICE O/P EST HI 40 MIN: CPT | Performed by: OBSTETRICS & GYNECOLOGY

## 2025-06-06 PROCEDURE — 3074F SYST BP LT 130 MM HG: CPT | Performed by: OBSTETRICS & GYNECOLOGY

## 2025-06-06 PROCEDURE — 3078F DIAST BP <80 MM HG: CPT | Performed by: OBSTETRICS & GYNECOLOGY

## 2025-06-12 LAB
ABO + RH BLD: NORMAL
BLD GP AB SCN SERPL QL: NEGATIVE
SPECIMEN EXP DATE BLD: NORMAL

## 2025-06-13 ENCOUNTER — PRENATAL OFFICE VISIT (OUTPATIENT)
Dept: OBGYN | Facility: CLINIC | Age: 35
End: 2025-06-13
Attending: OBSTETRICS & GYNECOLOGY
Payer: COMMERCIAL

## 2025-06-13 ENCOUNTER — ANCILLARY PROCEDURE (OUTPATIENT)
Dept: ULTRASOUND IMAGING | Facility: CLINIC | Age: 35
End: 2025-06-13
Attending: OBSTETRICS & GYNECOLOGY
Payer: COMMERCIAL

## 2025-06-13 DIAGNOSIS — O21.9 NAUSEA/VOMITING IN PREGNANCY: ICD-10-CM

## 2025-06-13 DIAGNOSIS — N96 HISTORY OF RECURRENT MISCARRIAGES: ICD-10-CM

## 2025-06-13 DIAGNOSIS — O36.80X0 PREGNANCY WITH UNCERTAIN FETAL VIABILITY, SINGLE OR UNSPECIFIED FETUS: ICD-10-CM

## 2025-06-13 DIAGNOSIS — O09.291 HISTORY OF MISCARRIAGE, CURRENTLY PREGNANT, FIRST TRIMESTER: ICD-10-CM

## 2025-06-13 DIAGNOSIS — E03.8 SUBCLINICAL HYPOTHYROIDISM: ICD-10-CM

## 2025-06-13 DIAGNOSIS — O02.1 MISSED ABORTION: Primary | ICD-10-CM

## 2025-06-13 LAB
ERYTHROCYTE [DISTWIDTH] IN BLOOD BY AUTOMATED COUNT: 12.3 % (ref 10–15)
EST. AVERAGE GLUCOSE BLD GHB EST-MCNC: 105 MG/DL
FACTOR 2 INTERPRETATION: NORMAL
FACTOR V INTERPRETATION: NORMAL
HBA1C MFR BLD: 5.3 % (ref 0–5.6)
HCT VFR BLD AUTO: 42.4 % (ref 35–47)
HGB BLD-MCNC: 14.1 G/DL (ref 11.7–15.7)
LAB DIRECTOR COMMENTS: NORMAL
LAB DIRECTOR DISCLAIMER: NORMAL
LAB DIRECTOR INTERPRETATION: NORMAL
LAB DIRECTOR METHODOLOGY: NORMAL
LAB DIRECTOR RESULTS: NORMAL
LOCATION OF TASK: NORMAL
MCH RBC QN AUTO: 27.9 PG (ref 26.5–33)
MCHC RBC AUTO-ENTMCNC: 33.3 G/DL (ref 31.5–36.5)
MCV RBC AUTO: 84 FL (ref 78–100)
PLATELET # BLD AUTO: 269 10E3/UL (ref 150–450)
RBC # BLD AUTO: 5.05 10E6/UL (ref 3.8–5.2)
SPECIMEN TYPE: NORMAL
T4 FREE SERPL-MCNC: 1.3 NG/DL (ref 0.9–1.7)
TSH SERPL DL<=0.005 MIU/L-ACNC: 1.27 UIU/ML (ref 0.3–4.2)
WBC # BLD AUTO: 6 10E3/UL (ref 4–11)

## 2025-06-13 PROCEDURE — 81241 F5 GENE: CPT | Performed by: OBSTETRICS & GYNECOLOGY

## 2025-06-13 PROCEDURE — 99215 OFFICE O/P EST HI 40 MIN: CPT | Performed by: OBSTETRICS & GYNECOLOGY

## 2025-06-13 PROCEDURE — 84443 ASSAY THYROID STIM HORMONE: CPT | Performed by: OBSTETRICS & GYNECOLOGY

## 2025-06-13 PROCEDURE — 84439 ASSAY OF FREE THYROXINE: CPT | Performed by: OBSTETRICS & GYNECOLOGY

## 2025-06-13 PROCEDURE — 3078F DIAST BP <80 MM HG: CPT | Performed by: OBSTETRICS & GYNECOLOGY

## 2025-06-13 PROCEDURE — 81240 F2 GENE: CPT | Performed by: OBSTETRICS & GYNECOLOGY

## 2025-06-13 PROCEDURE — 36415 COLL VENOUS BLD VENIPUNCTURE: CPT | Performed by: OBSTETRICS & GYNECOLOGY

## 2025-06-13 PROCEDURE — 83036 HEMOGLOBIN GLYCOSYLATED A1C: CPT | Performed by: OBSTETRICS & GYNECOLOGY

## 2025-06-13 PROCEDURE — 85390 FIBRINOLYSINS SCREEN I&R: CPT | Mod: 26 | Performed by: PATHOLOGY

## 2025-06-13 PROCEDURE — 3044F HG A1C LEVEL LT 7.0%: CPT | Performed by: OBSTETRICS & GYNECOLOGY

## 2025-06-13 PROCEDURE — 86850 RBC ANTIBODY SCREEN: CPT | Performed by: OBSTETRICS & GYNECOLOGY

## 2025-06-13 PROCEDURE — 86900 BLOOD TYPING SEROLOGIC ABO: CPT | Performed by: OBSTETRICS & GYNECOLOGY

## 2025-06-13 PROCEDURE — 85613 RUSSELL VIPER VENOM DILUTED: CPT | Performed by: OBSTETRICS & GYNECOLOGY

## 2025-06-13 PROCEDURE — 0502F SUBSEQUENT PRENATAL CARE: CPT | Performed by: OBSTETRICS & GYNECOLOGY

## 2025-06-13 PROCEDURE — 3074F SYST BP LT 130 MM HG: CPT | Performed by: OBSTETRICS & GYNECOLOGY

## 2025-06-13 PROCEDURE — 85027 COMPLETE CBC AUTOMATED: CPT | Performed by: OBSTETRICS & GYNECOLOGY

## 2025-06-13 PROCEDURE — 85730 THROMBOPLASTIN TIME PARTIAL: CPT | Performed by: OBSTETRICS & GYNECOLOGY

## 2025-06-13 PROCEDURE — 86901 BLOOD TYPING SEROLOGIC RH(D): CPT | Performed by: OBSTETRICS & GYNECOLOGY

## 2025-06-13 PROCEDURE — 76817 TRANSVAGINAL US OBSTETRIC: CPT | Performed by: OBSTETRICS & GYNECOLOGY

## 2025-06-13 RX ORDER — ONDANSETRON 8 MG/1
8 TABLET, FILM COATED ORAL EVERY 8 HOURS PRN
Qty: 30 TABLET | Refills: 1 | Status: SHIPPED | OUTPATIENT
Start: 2025-06-13

## 2025-06-13 NOTE — PROGRESS NOTES
SUBJECTIVE:                                                   Nory Woods is a 34 year old female who presents to clinic today for the following health issue(s):  Patient presents with:  Follow Up: US      Additional information:     HPI:  Patient is here to f/up on her repeat imaging due to lagging dates of pregnancy and slow fetal heart beat last week.    Knew she wasn't as far as 10+ weeks like her LMP b/c was tracking and has long cycles so based on OPK and sure DOC was 8+4 today and 7+3 a week ago when first came in.    Had normal doubling in HCG early on but last week 6+2 and +FHTs at only 92 and declining nausea/fatigue and pregnancy sx.  Today U/S repeated and it is showing 6+1 and now no FHTs    Has had no bleeding or cramping however her sx were just a little better last week when here and then this whole week was really nauseous and exhausted.  likely work week is extra challenging. unisom is helping but only in the morning since taking it before bed, but can't take it during the day to help evening b/c would be too sleepy.    Had D&C with her first SAB and it went fine and no issues and easy physical recovery but is open to whatever would be recommended this time. Just worries about too many procedures leading to scarring or other complications.    Would really like her thyroid tested again. Knows that we've checked several times since pregnancy and all normal and would not be causing this at the range it was with subclinical hypothyroidism.however doing some other labs today and would like that done again     has low sex drive and low energy and is on meds for erectile dysfunction. Wondering if he should go get checked out or do testosterone testing. Wondering if it could be impacting their fertility. Never had a s.a b/c never had trouble getting pregnant, just the miscarriages    Patient's last menstrual period was 04/01/2025 (within weeks)..     Patient is sexually active,  .  Using none for contraception.    reports that she has never smoked. She has never used smokeless tobacco.    STD testing offered?  Declined    Health maintenance updated:  yes    Today's PHQ-2 Score:       2024    10:30 AM   PHQ-2 (  [employer])   Q1: Little interest or pleasure in doing things 0   Q2: Feeling down, depressed or hopeless 0   PHQ-2 Score 0     Today's PHQ-9 Score:       2025     2:03 PM   PHQ-9 SCORE   PHQ-9 Total Score MyChart 0   PHQ-9 Total Score 0        Patient-reported     Today's DORCAS-7 Score:       2025     2:03 PM   DORCAS-7 SCORE   Total Score 1 (minimal anxiety)   Total Score 1        Patient-reported       Problem list and histories reviewed & adjusted, as indicated.  Additional history: as documented.    Patient Active Problem List   Diagnosis    Hypothyroidism    PCOS (polycystic ovarian syndrome)    Miscarriage    Anxiety    Supervision of high-risk pregnancy    Lymphadenopathy    FHx: colonic polyps    Elevated LFTs     Past Surgical History:   Procedure Laterality Date     SECTION N/A 3/25/2023    Procedure:  section;  Surgeon: January Lorenzo MD;  Location:  L+D    DILATION AND CURETTAGE SUCTION N/A 2021    Procedure: DILATION AND CURETTAGE, UTERUS, USING SUCTION;  Surgeon: Radha Iraheta MD;  Location:  OR    WISDOM TOOTH EXTRACTION        Social History     Tobacco Use    Smoking status: Never    Smokeless tobacco: Never   Substance Use Topics    Alcohol use: Not Currently      Problem (# of Occurrences) Relation (Name,Age of Onset)    Alzheimer Disease (1) Paternal Grandmother    Hypertension (1) Maternal Grandfather    Myocardial Infarction (1) Maternal Grandfather    Endometriosis (1) Mother    Pacemaker (1) Paternal Grandfather              Current Outpatient Medications   Medication Sig Dispense Refill    magnesium 250 MG tablet Take 1 tablet by mouth daily.      ondansetron (ZOFRAN) 8 MG tablet Take 1 tablet (8 mg) by  "mouth every 8 hours as needed for nausea. 30 tablet 1    Prenatal Multivit-Min-Fe-FA (PRENATAL, W/IRON & FA,) 27-0.8 MG TABS Take by mouth.      Prenatal-FeFum-FA-DHA w/o A (PRENATAL + DHA PO) Take by mouth. (Patient not taking: Reported on 6/13/2025)      propranolol (INDERAL) 10 MG tablet Take by mouth. (Patient not taking: Reported on 6/13/2025)      sertraline (ZOLOFT) 25 MG tablet Take 1 tablet (25 mg) by mouth daily. (Patient not taking: Reported on 6/13/2025) 90 tablet 0     No current facility-administered medications for this visit.     No Known Allergies        OBJECTIVE:     BP 90/64   Ht 1.695 m (5' 6.75\")   Wt 63 kg (138 lb 12.8 oz)   LMP 04/01/2025 (Within Weeks)   BMI 21.90 kg/m    Body mass index is 21.9 kg/m .    Exam:  Constitutional:  Appearance: Well nourished, well developed alert, in no acute distress  Chest:  Respiratory Effort:  Breathing unlabored. Clear to auscultation bilaterally.   Cardiovascular: Heart: Auscultation:  Regular rate, normal rhythm, no murmurs present  Gastrointestinal:  Abdominal Examination:  Abdomen nontender to palpation, tone normal without rigidity or guarding, no masses present, umbilicus without lesions; Liver/Spleen:  No hepatomegaly present, liver nontender to palpation; Hernias:  No hernias present     In-Clinic Test Results:  Results for orders placed or performed in visit on 06/13/25 (from the past 24 hours)   Hemoglobin A1c   Result Value Ref Range    Estimated Average Glucose 105 <117 mg/dL    Hemoglobin A1C 5.3 0.0 - 5.6 %   CBC with platelets   Result Value Ref Range    WBC Count 6.0 4.0 - 11.0 10e3/uL    RBC Count 5.05 3.80 - 5.20 10e6/uL    Hemoglobin 14.1 11.7 - 15.7 g/dL    Hematocrit 42.4 35.0 - 47.0 %    MCV 84 78 - 100 fL    MCH 27.9 26.5 - 33.0 pg    MCHC 33.3 31.5 - 36.5 g/dL    RDW 12.3 10.0 - 15.0 %    Platelet Count 269 150 - 450 10e3/uL   ABO/Rh type and screen    Narrative    The following orders were created for panel order ABO/Rh type " and screen.  Procedure                               Abnormality         Status                     ---------                               -----------         ------                     Adult Type and Screen[[id number]]                           Final result                 Please view results for these tests on the individual orders.   TSH   Result Value Ref Range    TSH 1.27 0.30 - 4.20 uIU/mL   T4, free   Result Value Ref Range    Free T4 1.30 0.90 - 1.70 ng/dL   Adult Type and Screen   Result Value Ref Range    ABO/RH(D) O POS     Antibody Screen Negative Negative    SPECIMEN EXPIRATION DATE 2025 11:59:00 PM CDT        ASSESSMENT/PLAN:                                                        ICD-10-CM    1. Missed   O02.1       2. Nausea/vomiting in pregnancy  O21.9 ondansetron (ZOFRAN) 8 MG tablet      3. Subclinical hypothyroidism  E03.8 TSH     T4, free     TSH     T4, free      4. Supervision of high risk pregnancy in first trimester  O09.91 Hemoglobin A1c      5. History of recurrent miscarriages  N96 Factor 2 and 5 mutation analysis     Lupus Anticoagulant Panel      6. History of miscarriage, currently pregnant, first trimester  O09.291 CBC with platelets     ABO/Rh type and screen          Patient now with her 3rd miscarriage.    Discussed waiting for SAB, medication induced AB and D&C  Reviewed the pros/cons of each and will proceed with D&C  Surgery request sent and will hopefully be able to find time on  so that I can do that for her    Reviewed D&C risks with bleeding, infection, uterine perforation and the risk of scarring or other complications  In a controlled setting and with suction curettage in setting of pregnancy the scarring, as well as all the other above risks are very low and would not preclude having another pregnancy in the future    Dicsussed karyotype analysis this tie since this is their 3rd miscarriage.  Discussed the information for this pregnancy doesn't imply that  this would be recurrent barring a balanced translocation in her or her , which is rare  However could also help with information gathering for recurrent miscarriage and she would like to do the testing  Will plan to do anora as paternal vs maternal aneuploidy can be identified as well with this    Do not think that her  issues are likely the cause of miscarriage as typically even if low T, which has not been evaluated, would cause the ED and low libido and even some decrease sperm counts but then wouldn't conceive vs conceiving and miscarriage.  However could certainly go to his PCP and at least eval for underlying causes for his ED at younger age and if S.A is warranted could pursue that.    Discussed that regardless of her fluctuations in TSH they have all been in range and profound hypo or hyperthyroid would be a cause for this and not her subclinical hypothyroid years ago.  However will repeat TFTs today    Also discussed other causes of recurrent miscarriage, despite a full term healthy pregnancy as well, and things like hypercoagualability d.o such as FVL or APLA syndrome could be something to assess  Some of the testing can be affected by currently being pregnant but that is typically more Prot C or S deficiency  For now will check APLA and FVL and Factor 2 and interventions as clinically indicated once results are aviailable    Will do her T&S and her CBC today as well. The T&S will  before her D&C by one day.  However if her hgb is normal then won't repeat day of surgery as risk for transfusion is very low  Previously known RH+ so rhogam will not be needed.    Rx sent in for zofran so she can use that until her D&C given the nausea  Reviewed the s.e and pros/cons  Can continue to use unisom at bedtime just to help with sleep/anxiety as going through this    Patient is o/w medically cleared to undergo procedure next week and will be contacted once scheduled.    45  minutes  spent on  direct management of the patient's medical issue(s) as above, including chart review and chart completion, on the same DOS      Radha Iraheta MD  [facility] FOR [facility] [place]

## 2025-06-14 VITALS
SYSTOLIC BLOOD PRESSURE: 90 MMHG | DIASTOLIC BLOOD PRESSURE: 64 MMHG | BODY MASS INDEX: 21.79 KG/M2 | WEIGHT: 138.8 LBS | HEIGHT: 67 IN

## 2025-06-14 RX ORDER — ONDANSETRON 2 MG/ML
4 INJECTION INTRAMUSCULAR; INTRAVENOUS ONCE
Status: CANCELLED | OUTPATIENT
Start: 2025-06-14

## 2025-06-14 RX ORDER — ACETAMINOPHEN 325 MG/1
975 TABLET ORAL ONCE
Status: CANCELLED | OUTPATIENT
Start: 2025-06-14 | End: 2025-06-14

## 2025-06-14 RX ORDER — ONDANSETRON 4 MG/1
4 TABLET, ORALLY DISINTEGRATING ORAL ONCE
Status: CANCELLED | OUTPATIENT
Start: 2025-06-14 | End: 2025-06-14

## 2025-06-16 ENCOUNTER — TELEPHONE (OUTPATIENT)
Dept: OBGYN | Facility: CLINIC | Age: 35
End: 2025-06-16

## 2025-06-16 LAB
LABORATORY COMMENT REPORT: NEGATIVE
LABORATORY COMMENT REPORT: NORMAL
LOCATION OF TASK: NORMAL
SCREEN APTT/NORMAL: 1.09
SCREEN DRVVT/NORMAL: 0.96 %

## 2025-06-16 NOTE — TELEPHONE ENCOUNTER
Type of surgery: SUCTION D&C   Location of surgery: [facility]  Date and time of surgery: 6/17/2025 10:05a  Surgeon: LAURIE  Pre-Op Appt Date: 6/13/2025  Post-Op Appt Date: NA   Packet sent out: UNSURE NOT HERE  Pre-cert/Authorization completed:  TBD  Date: 6/16/2025 SCHEDULED BY STERLING Ferraro  Surgery Scheduler

## 2025-06-16 NOTE — TELEPHONE ENCOUNTER
ERAS BAG GIVEN No  ERAS INSTRUCTIONS EXPLAINED No    ASSIST: (MD, PA, CNM, NONE) NA    H&P TO BE COMPLETED BY:   Surgeon  FOR H&P TO BE DONE BY SURGEON   ALREADY DONE:  DATE  6/13/25  SAME DAY/OBSERVATION/INPATIENT: STRAIGHT SAME DAY  EQUIPMENT: ROUTINE  VENDOR NEEDED AT CASE: NA  IF IUD WHAT BRAND NA  LABS/SPECIAL INSTRUCTIONS: NA  TIME OFF WORK: 2 DAYS  DO YOU ESTIMATE EXTRA TIME NEEDED THAN YOUR AVERAGE ON THIS CASE? NO  IF YES, HOW MUCH EXTRA TIME IN MINUTES? NA  POST OP TO BE SCHEDULED AT NA WEEKS AFTER SX APPOINTMENT LENGTH IN MINUTES NA

## 2025-06-17 ENCOUNTER — ANESTHESIA (OUTPATIENT)
Dept: SURGERY | Facility: CLINIC | Age: 35
End: 2025-06-17
Payer: COMMERCIAL

## 2025-06-17 ENCOUNTER — HOSPITAL ENCOUNTER (OUTPATIENT)
Facility: CLINIC | Age: 35
Discharge: HOME | End: 2025-06-17
Attending: OBSTETRICS & GYNECOLOGY | Admitting: OBSTETRICS & GYNECOLOGY
Payer: COMMERCIAL

## 2025-06-17 ENCOUNTER — TRANSFERRED RECORDS (OUTPATIENT)
Dept: HEALTH INFORMATION MANAGEMENT | Facility: CLINIC | Age: 35
End: 2025-06-17

## 2025-06-17 ENCOUNTER — ANESTHESIA EVENT (OUTPATIENT)
Dept: SURGERY | Facility: CLINIC | Age: 35
End: 2025-06-17
Payer: COMMERCIAL

## 2025-06-17 VITALS
HEIGHT: 67 IN | SYSTOLIC BLOOD PRESSURE: 117 MMHG | OXYGEN SATURATION: 100 % | BODY MASS INDEX: 21.83 KG/M2 | WEIGHT: 139.1 LBS | TEMPERATURE: 98.4 F | HEART RATE: 72 BPM | DIASTOLIC BLOOD PRESSURE: 82 MMHG | RESPIRATION RATE: 16 BRPM

## 2025-06-17 DIAGNOSIS — O03.9 MISCARRIAGE: Primary | ICD-10-CM

## 2025-06-17 DIAGNOSIS — O02.1 MISSED ABORTION: ICD-10-CM

## 2025-06-17 PROCEDURE — 250N000013 HC RX MED GY IP 250 OP 250 PS 637: Performed by: OBSTETRICS & GYNECOLOGY

## 2025-06-17 PROCEDURE — 360N000075 HC SURGERY LEVEL 2, PER MIN: Performed by: OBSTETRICS & GYNECOLOGY

## 2025-06-17 PROCEDURE — 88305 TISSUE EXAM BY PATHOLOGIST: CPT | Mod: 26 | Performed by: STUDENT IN AN ORGANIZED HEALTH CARE EDUCATION/TRAINING PROGRAM

## 2025-06-17 PROCEDURE — 250N000009 HC RX 250: Performed by: NURSE ANESTHETIST, CERTIFIED REGISTERED

## 2025-06-17 PROCEDURE — 59820 CARE OF MISCARRIAGE: CPT | Performed by: OBSTETRICS & GYNECOLOGY

## 2025-06-17 PROCEDURE — 710N000012 HC RECOVERY PHASE 2, PER MINUTE: Performed by: OBSTETRICS & GYNECOLOGY

## 2025-06-17 PROCEDURE — 258N000003 HC RX IP 258 OP 636: Performed by: NURSE ANESTHETIST, CERTIFIED REGISTERED

## 2025-06-17 PROCEDURE — 258N000003 HC RX IP 258 OP 636: Performed by: ANESTHESIOLOGY

## 2025-06-17 PROCEDURE — 258N000003 HC RX IP 258 OP 636: Performed by: OBSTETRICS & GYNECOLOGY

## 2025-06-17 PROCEDURE — 999N000141 HC STATISTIC PRE-PROCEDURE NURSING ASSESSMENT: Performed by: OBSTETRICS & GYNECOLOGY

## 2025-06-17 PROCEDURE — 272N000001 HC OR GENERAL SUPPLY STERILE: Performed by: OBSTETRICS & GYNECOLOGY

## 2025-06-17 PROCEDURE — 250N000011 HC RX IP 250 OP 636: Performed by: NURSE ANESTHETIST, CERTIFIED REGISTERED

## 2025-06-17 PROCEDURE — 250N000011 HC RX IP 250 OP 636: Performed by: OBSTETRICS & GYNECOLOGY

## 2025-06-17 PROCEDURE — 88305 TISSUE EXAM BY PATHOLOGIST: CPT | Mod: TC | Performed by: OBSTETRICS & GYNECOLOGY

## 2025-06-17 PROCEDURE — 370N000017 HC ANESTHESIA TECHNICAL FEE, PER MIN: Performed by: OBSTETRICS & GYNECOLOGY

## 2025-06-17 PROCEDURE — 710N000009 HC RECOVERY PHASE 1, LEVEL 1, PER MIN: Performed by: OBSTETRICS & GYNECOLOGY

## 2025-06-17 RX ORDER — HYDRALAZINE HYDROCHLORIDE 20 MG/ML
2.5-5 INJECTION INTRAMUSCULAR; INTRAVENOUS
Status: DISCONTINUED | OUTPATIENT
Start: 2025-06-17 | End: 2025-06-17 | Stop reason: HOSPADM

## 2025-06-17 RX ORDER — ONDANSETRON 4 MG/1
4 TABLET, ORALLY DISINTEGRATING ORAL EVERY 30 MIN PRN
Status: DISCONTINUED | OUTPATIENT
Start: 2025-06-17 | End: 2025-06-17 | Stop reason: HOSPADM

## 2025-06-17 RX ORDER — LIDOCAINE HYDROCHLORIDE 20 MG/ML
INJECTION, SOLUTION INFILTRATION; PERINEURAL PRN
Status: DISCONTINUED | OUTPATIENT
Start: 2025-06-17 | End: 2025-06-17

## 2025-06-17 RX ORDER — DEXAMETHASONE SODIUM PHOSPHATE 4 MG/ML
4 INJECTION, SOLUTION INTRA-ARTICULAR; INTRALESIONAL; INTRAMUSCULAR; INTRAVENOUS; SOFT TISSUE
Status: DISCONTINUED | OUTPATIENT
Start: 2025-06-17 | End: 2025-06-17 | Stop reason: HOSPADM

## 2025-06-17 RX ORDER — PROPOFOL 10 MG/ML
INJECTION, EMULSION INTRAVENOUS PRN
Status: DISCONTINUED | OUTPATIENT
Start: 2025-06-17 | End: 2025-06-17

## 2025-06-17 RX ORDER — ONDANSETRON 2 MG/ML
4 INJECTION INTRAMUSCULAR; INTRAVENOUS EVERY 30 MIN PRN
Status: DISCONTINUED | OUTPATIENT
Start: 2025-06-17 | End: 2025-06-17 | Stop reason: HOSPADM

## 2025-06-17 RX ORDER — IBUPROFEN 400 MG/1
800 TABLET, FILM COATED ORAL ONCE
Status: DISCONTINUED | OUTPATIENT
Start: 2025-06-17 | End: 2025-06-17 | Stop reason: HOSPADM

## 2025-06-17 RX ORDER — NALOXONE HYDROCHLORIDE 0.4 MG/ML
0.1 INJECTION, SOLUTION INTRAMUSCULAR; INTRAVENOUS; SUBCUTANEOUS
Status: DISCONTINUED | OUTPATIENT
Start: 2025-06-17 | End: 2025-06-17 | Stop reason: HOSPADM

## 2025-06-17 RX ORDER — ACETAMINOPHEN 325 MG/1
975 TABLET ORAL ONCE
Status: DISCONTINUED | OUTPATIENT
Start: 2025-06-17 | End: 2025-06-17 | Stop reason: HOSPADM

## 2025-06-17 RX ORDER — OXYCODONE HYDROCHLORIDE 5 MG/1
5 TABLET ORAL
Status: DISCONTINUED | OUTPATIENT
Start: 2025-06-17 | End: 2025-06-17 | Stop reason: HOSPADM

## 2025-06-17 RX ORDER — SODIUM CHLORIDE, SODIUM LACTATE, POTASSIUM CHLORIDE, CALCIUM CHLORIDE 600; 310; 30; 20 MG/100ML; MG/100ML; MG/100ML; MG/100ML
INJECTION, SOLUTION INTRAVENOUS CONTINUOUS
Status: DISCONTINUED | OUTPATIENT
Start: 2025-06-17 | End: 2025-06-17 | Stop reason: HOSPADM

## 2025-06-17 RX ORDER — LABETALOL HYDROCHLORIDE 5 MG/ML
5 INJECTION, SOLUTION INTRAVENOUS
Status: DISCONTINUED | OUTPATIENT
Start: 2025-06-17 | End: 2025-06-17 | Stop reason: HOSPADM

## 2025-06-17 RX ORDER — ACETAMINOPHEN 325 MG/1
975 TABLET ORAL ONCE
Status: COMPLETED | OUTPATIENT
Start: 2025-06-17 | End: 2025-06-17

## 2025-06-17 RX ORDER — LIDOCAINE 40 MG/G
CREAM TOPICAL
Status: DISCONTINUED | OUTPATIENT
Start: 2025-06-17 | End: 2025-06-17 | Stop reason: HOSPADM

## 2025-06-17 RX ORDER — ONDANSETRON 4 MG/1
4 TABLET, ORALLY DISINTEGRATING ORAL ONCE
Status: COMPLETED | OUTPATIENT
Start: 2025-06-17 | End: 2025-06-17

## 2025-06-17 RX ORDER — FENTANYL CITRATE 50 UG/ML
INJECTION, SOLUTION INTRAMUSCULAR; INTRAVENOUS PRN
Status: DISCONTINUED | OUTPATIENT
Start: 2025-06-17 | End: 2025-06-17

## 2025-06-17 RX ORDER — ONDANSETRON 2 MG/ML
4 INJECTION INTRAMUSCULAR; INTRAVENOUS ONCE
Status: COMPLETED | OUTPATIENT
Start: 2025-06-17 | End: 2025-06-17

## 2025-06-17 RX ORDER — FENTANYL CITRATE 0.05 MG/ML
50 INJECTION, SOLUTION INTRAMUSCULAR; INTRAVENOUS EVERY 5 MIN PRN
Status: DISCONTINUED | OUTPATIENT
Start: 2025-06-17 | End: 2025-06-17 | Stop reason: HOSPADM

## 2025-06-17 RX ORDER — ONDANSETRON 2 MG/ML
INJECTION INTRAMUSCULAR; INTRAVENOUS PRN
Status: DISCONTINUED | OUTPATIENT
Start: 2025-06-17 | End: 2025-06-17

## 2025-06-17 RX ORDER — LIDOCAINE HYDROCHLORIDE 10 MG/ML
INJECTION, SOLUTION INFILTRATION; PERINEURAL
Status: DISCONTINUED
Start: 2025-06-17 | End: 2025-06-17 | Stop reason: HOSPADM

## 2025-06-17 RX ORDER — MEPERIDINE HYDROCHLORIDE 25 MG/ML
12.5 INJECTION INTRAMUSCULAR; INTRAVENOUS; SUBCUTANEOUS EVERY 5 MIN PRN
Status: DISCONTINUED | OUTPATIENT
Start: 2025-06-17 | End: 2025-06-17 | Stop reason: HOSPADM

## 2025-06-17 RX ORDER — FENTANYL CITRATE 0.05 MG/ML
25 INJECTION, SOLUTION INTRAMUSCULAR; INTRAVENOUS EVERY 5 MIN PRN
Status: DISCONTINUED | OUTPATIENT
Start: 2025-06-17 | End: 2025-06-17 | Stop reason: HOSPADM

## 2025-06-17 RX ORDER — OXYCODONE HYDROCHLORIDE 5 MG/1
10 TABLET ORAL
Status: DISCONTINUED | OUTPATIENT
Start: 2025-06-17 | End: 2025-06-17 | Stop reason: HOSPADM

## 2025-06-17 RX ORDER — HYDROMORPHONE HCL IN WATER/PF 6 MG/30 ML
0.4 PATIENT CONTROLLED ANALGESIA SYRINGE INTRAVENOUS EVERY 5 MIN PRN
Status: DISCONTINUED | OUTPATIENT
Start: 2025-06-17 | End: 2025-06-17 | Stop reason: HOSPADM

## 2025-06-17 RX ORDER — HYDROMORPHONE HCL IN WATER/PF 6 MG/30 ML
0.2 PATIENT CONTROLLED ANALGESIA SYRINGE INTRAVENOUS EVERY 5 MIN PRN
Status: DISCONTINUED | OUTPATIENT
Start: 2025-06-17 | End: 2025-06-17 | Stop reason: HOSPADM

## 2025-06-17 RX ORDER — PROPOFOL 10 MG/ML
INJECTION, EMULSION INTRAVENOUS CONTINUOUS PRN
Status: DISCONTINUED | OUTPATIENT
Start: 2025-06-17 | End: 2025-06-17

## 2025-06-17 RX ADMIN — MIDAZOLAM 2 MG: 1 INJECTION INTRAMUSCULAR; INTRAVENOUS at 10:28

## 2025-06-17 RX ADMIN — ONDANSETRON 4 MG: 2 INJECTION INTRAMUSCULAR; INTRAVENOUS at 10:45

## 2025-06-17 RX ADMIN — PHENYLEPHRINE HYDROCHLORIDE 0.2 MCG/KG/MIN: 10 INJECTION INTRAVENOUS at 10:45

## 2025-06-17 RX ADMIN — LIDOCAINE HYDROCHLORIDE 3 MG: 20 INJECTION, SOLUTION INFILTRATION; PERINEURAL at 10:38

## 2025-06-17 RX ADMIN — SODIUM CHLORIDE, SODIUM LACTATE, POTASSIUM CHLORIDE, AND CALCIUM CHLORIDE: .6; .31; .03; .02 INJECTION, SOLUTION INTRAVENOUS at 10:26

## 2025-06-17 RX ADMIN — ACETAMINOPHEN 975 MG: 325 TABLET, FILM COATED ORAL at 08:45

## 2025-06-17 RX ADMIN — FENTANYL CITRATE 50 MCG: 50 INJECTION INTRAMUSCULAR; INTRAVENOUS at 10:46

## 2025-06-17 RX ADMIN — ONDANSETRON 4 MG: 2 INJECTION, SOLUTION INTRAMUSCULAR; INTRAVENOUS at 08:52

## 2025-06-17 RX ADMIN — DOXYCYCLINE 200 MG: 100 INJECTION, POWDER, LYOPHILIZED, FOR SOLUTION INTRAVENOUS at 08:56

## 2025-06-17 RX ADMIN — PROPOFOL 40 MG: 10 INJECTION, EMULSION INTRAVENOUS at 10:38

## 2025-06-17 RX ADMIN — PROPOFOL 125 MCG/KG/MIN: 10 INJECTION, EMULSION INTRAVENOUS at 10:38

## 2025-06-17 ASSESSMENT — ACTIVITIES OF DAILY LIVING (ADL)
ADLS_ACUITY_SCORE: 48

## 2025-06-17 NOTE — ANESTHESIA PREPROCEDURE EVALUATION
"Anesthesia Pre-Procedure Evaluation    Patient: Nory Woods   MRN: [id number] : 1990          Procedure : Procedure(s):  DILATION AND CURETTAGE, UTERUS, USING SUCTION         Past Medical History:   Diagnosis Date    Anxiety 2023    Depressive disorder     History of PCOS     had amenorrhea in college. pt reports hirsutism most of her life but also \"very skinny\" in college and occasionally had cysts. normal and regular periods in adult life    Hypothyroidism     Mild depression     Tinnitus, bilateral       Past Surgical History:   Procedure Laterality Date     SECTION N/A 3/25/2023    Procedure:  section;  Surgeon: January Lorenzo MD;  Location: [facility] L[facility]    DILATION AND CURETTAGE SUCTION N/A 2021    Procedure: DILATION AND CURETTAGE, UTERUS, USING SUCTION;  Surgeon: Radha Iraheta MD;  Location: [facility] OR    WISDOM TOOTH EXTRACTION        No Known Allergies   Social History     Tobacco Use    Smoking status: Never    Smokeless tobacco: Never   Substance Use Topics    Alcohol use: Not Currently      Wt Readings from Last 1 Encounters:   25 63.1 kg (139 lb 1.6 oz)        Anesthesia Evaluation            ROS/MED HX  ENT/Pulmonary:       Neurologic:       Cardiovascular:       METS/Exercise Tolerance:     Hematologic:       Musculoskeletal:       GI/Hepatic:       Renal/Genitourinary:       Endo:     (+)          thyroid problem, hypothyroidism,           Psychiatric/Substance Use:     (+) psychiatric history anxiety       Infectious Disease:       Malignancy:       Other: Comment: PCOS             Physical Exam  Airway  Mallampati: II  TM distance: >3 FB  Neck ROM: full  Mouth opening: >= 4 cm    Cardiovascular   Rhythm: regular     Dental   (+) Minor Abnormalities - some fillings, tiny chips      Pulmonary Breath sounds clear to auscultation        Neurological   She appears awake, alert and oriented x3.    Other Findings       OUTSIDE LABS:  CBC:   Lab Results " "  Component Value Date    WBC 6.0 06/13/2025    WBC 10.8 03/23/2023    HGB 14.1 06/13/2025    HGB 11.5 (L) 03/26/2023    HCT 42.4 06/13/2025    HCT 41.5 03/23/2023     06/13/2025     03/23/2023     BMP:   Lab Results   Component Value Date     (L) 01/19/2023     12/31/2021    POTASSIUM 4.0 01/19/2023    POTASSIUM 3.7 12/31/2021    CHLORIDE 104 01/19/2023    CHLORIDE 107 12/31/2021    CO2 22 01/19/2023    CO2 25 12/31/2021    BUN 6.0 01/19/2023    BUN 9 12/31/2021    CR 0.42 (L) 01/19/2023    CR 0.57 12/31/2021    GLC 91 01/19/2023     (H) 12/31/2021     COAGS: No results found for: \"PTT\", \"INR\", \"FIBR\"  POC: No results found for: \"BGM\", \"HCG\", \"HCGS\"  HEPATIC:   Lab Results   Component Value Date    ALBUMIN 3.6 01/19/2023    PROTTOTAL 6.1 (L) 01/19/2023    ALT 36 (H) 01/19/2023    AST 30 01/19/2023    ALKPHOS 103 01/19/2023    BILITOTAL 0.2 01/19/2023     OTHER:   Lab Results   Component Value Date    LACT 1.2 12/31/2021    A1C 5.3 06/13/2025    CLAUDIO 9.0 01/19/2023    TSH 1.27 06/13/2025    T4 1.30 06/13/2025       Anesthesia Plan    ASA Status:  2       Anesthesia Type: MAC.        Consents    Anesthesia Plan(s) and associated risks, benefits, and realistic alternatives discussed. Questions answered and patient/representative(s) expressed understanding.     - Discussed:     - Discussed with:  Patient               Postoperative Care    Pain management: multimodal analgesia.     Comments:                   Krunal Loco MD    I have reviewed the pertinent notes and labs in the chart from the past 30 days and (re)examined the patient.  Any updates or changes from those notes are reflected in this note.    Clinically Significant Risk Factors Present on Admission                                              "

## 2025-06-17 NOTE — DISCHARGE INSTRUCTIONS
Today you were given 975 mg of Tylenol at 8:45 AM. The recommended daily maximum dose is 4000 mg.      Same Day Surgery Discharge Instructions for  Sedation and General Anesthesia     It's not unusual to feel dizzy, light-headed or faint for up to 24 hours after surgery or while taking pain medication.  If you have these symptoms: sit for a few minutes before standing and have someone assist you when you get up to walk or use the bathroom.    You should rest and relax for the next 24 hours. We recommend you make arrangements to have an adult stay with you for at least 24 hours after your discharge.  Avoid hazardous and strenuous activity.    DO NOT DRIVE any vehicle or operate mechanical equipment for 24 hours following the end of your surgery.  Even though you may feel normal, your reactions may be affected by the medication you have received.    Do not drink alcoholic beverages for 24 hours following surgery.     Slowly progress to your regular diet as you feel able. It's not unusual to feel nauseated and/or vomit after receiving anesthesia.  If you develop these symptoms, drink clear liquids (apple juice, ginger ale, broth, 7-up, etc. ) until you feel better.  If your nausea and vomiting persists for 24 hours, please notify your surgeon.      All narcotic pain medications, along with inactivity and anesthesia, can cause constipation. Drinking plenty of liquids and increasing fiber intake will help.    For any questions of a medical nature, call your surgeon.    Do not make important decisions for 24 hours.    If you had general anesthesia, you may have a sore throat for a couple of days related to the breathing tube used during surgery.  You may use Cepacol lozenges to help with this discomfort.  If it worsens or if you develop a fever, contact your surgeon.     If you feel your pain is not well managed with the pain medications prescribed by your surgeon, please contact your surgeon's office to let them know so  they can address your concerns.          ____________________________________________    Our hearts go out to you at this difficult time. Be assured that there is no right way to find comfort and support. It may take time to find out what works for you.  You were given a packet in pre-op with additional support and resources.  Please read when you are ready and contact resources as needed.     ______________________________________________    Same Day Surgery Discharge Instructions for  Sedation and General Anesthesia     It's not unusual to feel dizzy, light-headed or faint for up to 24 hours after surgery or while taking pain medication.  If you have these symptoms: sit for a few minutes before standing and have someone assist you when you get up to walk or use the bathroom.    You should rest and relax for the next 24 hours. We recommend you make arrangements to have an adult stay with you for at least 24 hours after your discharge.  Avoid hazardous and strenuous activity.    DO NOT DRIVE any vehicle or operate mechanical equipment for 24 hours following the end of your surgery.  Even though you may feel normal, your reactions may be affected by the medication you have received.    Do not drink alcoholic beverages for 24 hours following surgery.     Slowly progress to your regular diet as you feel able. It's not unusual to feel nauseated and/or vomit after receiving anesthesia.  If you develop these symptoms, drink clear liquids (apple juice, ginger ale, broth, 7-up, etc. ) until you feel better.  If your nausea and vomiting persists for 24 hours, please notify your surgeon.      All narcotic pain medications, along with inactivity and anesthesia, can cause constipation. Drinking plenty of liquids and increasing fiber intake will help.    For any questions of a medical nature, call your surgeon.    Do not make important decisions for 24 hours.    If you had general anesthesia, you may have a sore throat for a  couple of days related to the breathing tube used during surgery.  You may use Cepacol lozenges to help with this discomfort.  If it worsens or if you develop a fever, contact your surgeon.     If you feel your pain is not well managed with the pain medications prescribed by your surgeon, please contact your surgeon's office to let them know so they can address your concerns.      **If you have questions or concerns about your procedure,  call Dr. Iraheta at [phone]**

## 2025-06-17 NOTE — ANESTHESIA POSTPROCEDURE EVALUATION
Patient: Nory Woods    Procedure: Procedure(s):  DILATION AND CURETTAGE, UTERUS, USING SUCTION       Anesthesia Type:  MAC    Note:     Postop Pain Control: Uneventful            Sign Out: Well controlled pain   PONV:    Neuro/Psych: Uneventful            Sign Out: Acceptable/Baseline neuro status   Airway/Respiratory: Uneventful            Sign Out: Acceptable/Baseline resp. status   CV/Hemodynamics: Uneventful            Sign Out: Acceptable CV status; No obvious hypovolemia; No obvious fluid overload   Other NRE:    DID A NON-ROUTINE EVENT OCCUR?            Last vitals:  Vitals Value Taken Time   /84 06/17/25 12:00   Temp 36.9  C (98.4  F) 06/17/25 12:00   Pulse 73 06/17/25 12:00   Resp 27 06/17/25 12:00   SpO2 100 % 06/17/25 12:00   Vitals shown include unfiled device data.    Electronically Signed By: Krunal Loco MD  June 17, 2025  2:31 PM

## 2025-06-17 NOTE — ANESTHESIA CARE TRANSFER NOTE
Patient: Nory Woods    Procedure: Procedure(s):  DILATION AND CURETTAGE, UTERUS, USING SUCTION       Diagnosis: Missed  [O02.1]  Diagnosis Additional Information: No value filed.    Anesthesia Type:   MAC     Note:    Oropharynx: oropharynx clear of all foreign objects  Level of Consciousness: awake  Oxygen Supplementation: face mask  Level of Supplemental Oxygen (L/min / FiO2): 6l  Independent Airway: airway patency satisfactory and stable  Dentition: dentition unchanged  Vital Signs Stable: post-procedure vital signs reviewed and stable  Report to RN Given: handoff report given  Patient transferred to: PACU    Handoff Report: Identifed the Patient, Identified the Reponsible Provider, Reviewed the pertinent medical history, Discussed the surgical course, Reviewed Intra-OP anesthesia mangement and issues during anesthesia, Set expectations for post-procedure period and Allowed opportunity for questions and acknowledgement of understanding      Vitals:  Vitals Value Taken Time   /65 25 11:08   Temp 36.4  C (97.52  F) 25 11:09   Pulse 71 25 11:08   Resp 28 25 11:09   SpO2 99 % 25 11:09   Vitals shown include unfiled device data.    Electronically Signed By: MAHAD Baez CRNA  2025  11:11 AM

## 2025-06-17 NOTE — OP NOTE
OPERATIVE REPORT     Date of Service: 25     Preoperative Diagnosis:  1)Missed AB 8+ weeks by dates measuring 6+1 on ultrasound     Postoperative Diagnosis:  1)Same     Procedure:  Suction Dilation and Curettage     Surgeon:  Radha Iraheta MD     Anesthesia:  MAC     EBL:   5 cc     Complications:  none     Specimens:   Products of conception     Operative Findings:  Normal multiparous appearing cervix. Easy placement of a #6 hegar and then #7 hegar from that. Sounded to 11 cm. Larger amount of POC removed compared to gestational age of 6+ weeks for the CRL though based on 8 weeks of pregnancy likely mostly uterine cast that was the additional larger volume tissue.    After completion, POC floated and gestational tissue placed in saline to send to [facility] testing for karyotype in addition to routine gross/micro for [facility] pathology     Operative Indications:     Nory is a 35yo  who presented for history of miscarriage x2 when early pregnant for viability U/S. Had normally doubling HCG at 4-5 weeks which was established by OPK and sure DOC and not c/w LMP which is typical for her with irregular cycles. First U/S was 6+2 with FHTs of only 92 bpm and should have been 7+3 at that time. Repeat U/S at 8+3 by sure dates and the fetal pole CRL was 6+1 and no FHTs.  Patient wished to proceed with D&C for definitive mgmt as had had one previously in  with her 10 week MAB. Reviewed the pros/cons/risks/recovery.  Also discussed genetic testing given this is her 3rd miscarriage and potential information that can be obtained from it and patient did wish to proceed with this now that it's her third.  Send out karyotyping will be done through [facility]       Procedure in Detail:  The patient was taken to the operating room where MAC was obtained. Patient was placed in dorsal lithotomy position and prepped and draped in the normal fashion.  Time out was undertaken and passed. A speculum was placed into  the vaginal and the anterior lip of the cervix was grasped with a tenaculum.  The uterus was sounded to 11 cm.   Serial dilation up to a #7 hegar dilator was done, and the #7 curved curette was then passed into the uterine cavity and  tissue was removed consistent with POC.  Some additional passes were done until only very minimal blood was seen in the suction curette/tubing. One single gentle sharp curettage was done of all the surfaces and there was a clear gritty texture noted throughout the entire cavity. One final suction pass was done and no further tissue and very minimal blood was noted,  and at this point the procedure was completed.    The tenaculum was removed from the cervix and the tenaculum sites were hemostatic, and at that point the speculum was removed as well.    The patient tolerated the procedure well and there were no complications.  Sponge, needle, and instrument counts were correct x2.        Disposition:  The patient was taken to the PACU in stable condition and will be discharged to home as soon as awake, alert and stable     Radha Iraheta MD

## 2025-06-18 LAB
PATH REPORT.COMMENTS IMP SPEC: NORMAL
PATH REPORT.FINAL DX SPEC: NORMAL
PATH REPORT.GROSS SPEC: NORMAL
PATH REPORT.MICROSCOPIC SPEC OTHER STN: NORMAL
PATH REPORT.RELEVANT HX SPEC: NORMAL
PHOTO IMAGE: NORMAL

## 2025-06-20 ENCOUNTER — TRANSFERRED RECORDS (OUTPATIENT)
Dept: HEALTH INFORMATION MANAGEMENT | Facility: CLINIC | Age: 35
End: 2025-06-20

## 2025-06-23 ENCOUNTER — NURSE TRIAGE (OUTPATIENT)
Dept: INTERNAL MEDICINE | Facility: CLINIC | Age: 35
End: 2025-06-23
Payer: COMMERCIAL

## 2025-06-23 NOTE — TELEPHONE ENCOUNTER
Nurse Triage SBAR    Is this a 2nd Level Triage? YES, LICENSED PRACTITIONER REVIEW IS REQUIRED    Situation: Pt called the clinic reporting that she passed a quarter-sized tissue after a D&C procedure on 6/17.    Background: Pt states she has had this procedure 4 years ago as well.     Assessment: Pt denies fever or other symptoms at this time. Very light vaginal bleeding throghout the day. Mild cramping comes and goes.   She passed a few small clots here and there since the procedure, and wants to ensure the quarter-sized light pink tissue is normal.     Informed pt per up to date:  POSTOPERATIVE CARE AND FOLLOW-UP   Patients may resume normal activities as soon as the effects of anesthesia have worn off and they are comfortable.  Cramps are the most common side effect. They usually subside soon after the procedure but may last for a day or two. Nonsteroidal anti-inflammatory drugs provide adequate analgesia. Light bleeding can persist for several days. Heavy bleeding, such as saturating a sanitary pad within one hour more than once, is abnormal.  The patient should call the surgeon if they experience fever (more than 100.4 F), cramps lasting longer than 48 hours, increasing pelvic or abdominal pain, prolonged or heavy bleeding, or a foul-smelling vaginal discharge.  Most surgeons recommend pelvic rest for some duration of time, ranging from three days to two weeks, to prevent bleeding and/or infection. There is no evidence to support any specific recommendations.    Protocol Recommended Disposition:   No disposition on file.    Recommendation: Routing to OBGYN team for review and to advise pt.     Routed to provider    Does the patient meet one of the following criteria for ADS visit consideration? 16+ years old, with an [facility] PCP     TIP  Providers, please consider if this condition is appropriate for management at one of our Acute and Diagnostic Services sites.     If patient is a good candidate, please use  dotphrase <dot>triageresponse and select Refer to ADS to document.      Additional Information   Negative: Pain or burning with passing urine (urination) is main symptom   Negative: Pregnant with vaginal discharge   Negative: SEVERE abdominal pain (e.g., excruciating)   Negative: Patient sounds very sick or weak to the triager   Negative: Yellow or green vaginal discharge and has a fever   Negative: Constant abdominal pain lasting > 2 hours   Negative: Shock suspected (e.g., cold/pale/clammy skin, too weak to stand, low BP, rapid pulse)   Negative: Sounds like a life-threatening emergency to the triager   Negative: [1] Diagnosed with a threatened miscarriage or threatened  AND [2] did not take medicines to induce  (medication )   Negative: SEVERE vaginal bleeding (e.g., soaking 2 pads or tampons per hour and present 2 or more hours; 1 menstrual cup every 2 hours)   Negative: [1] SEVERE abdominal pain AND [2] starting more than 24 hours after taking  pill(s) (i.e., misoprostol on Day 2 or 3)   Negative: MODERATE vaginal bleeding (e.g., soaking 1 pad or tampon per hour and present > 6 hours; 1 menstrual cup every 6 hours)   Negative: Patient sounds very sick or weak to the triager   Negative: [1] Constant abdominal pain AND [2] present > 2 hours AND [3] occurring more than 24 hours after taking  pill(s)   Negative: [1] Fever > 100.4 F (38.0 C) AND [2] occurring more than 24 hours after taking  pill(s)   Negative: [1] Caller has URGENT question AND [2] triager unable to answer question   Negative: [1] Took  medicine ( pills) as directed > 24 hours ago AND [2] there has been minimal or no vaginal bleeding   Negative: [1] Took  medicine ( pills) as directed > 7 days ago AND [2] still feels pregnant   Negative: [1] Caller has NON-URGENT question AND [2] triager unable to answer question    Protocols used: Vaginal [id number],  -  Medication Induced Follow-up Call-A-AH  Thank you,  Cat Segovia RN

## 2025-07-15 ENCOUNTER — LAB (OUTPATIENT)
Dept: LAB | Facility: CLINIC | Age: 35
End: 2025-07-15
Payer: COMMERCIAL

## 2025-07-15 DIAGNOSIS — Z98.890 HISTORY OF DILATION AND CURETTAGE: ICD-10-CM

## 2025-07-15 DIAGNOSIS — O02.1 MISSED ABORTION: ICD-10-CM

## 2025-07-15 LAB — HCG INTACT+B SERPL-ACNC: 8 MIU/ML

## 2025-07-15 PROCEDURE — 36415 COLL VENOUS BLD VENIPUNCTURE: CPT

## 2025-07-15 PROCEDURE — 84702 CHORIONIC GONADOTROPIN TEST: CPT

## 2025-07-17 NOTE — PROGRESS NOTES
[facility]  OB/GYN Clinic Note    Nory Woods is a 35 year old female who is being evaluated via a patient initiated billable telephone visit.     What phone number would you like to be contacted at? [phone]  How would you like to obtain your AVS? [place]    Originating Location (pt. Location): home      Distant Location (provider location):  On-site    Reason for telephone (audio-only): Patient did not consent to video    SUBJECTIVE:                                                   Nory Woods is a 35 year old   female who presents for virtual visit today for the following health issue(s):  Patient presents with:  Follow Up: Test results    Additional information: review lab results     Virtual Visit Details    Type of service:  Telephone Visit   Phone call duration: 7 minutes     HPI:  Patient is here to follow-up on genetic testing done for missed ab.   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 Wondeirng about need for genetic testing.   Had serial bHCG done due to continued +UPT, which is now negative. Ovulating now.   Completed RPL work-up with Dr. Iraheta . TSH wnl. Completed testing for APLAS which was negative.     Patient's last menstrual period was 2025 (within weeks)..   Patient is sexually active, .  Using none for contraception.    reports that she has never smoked. She has never used smokeless tobacco.  Health maintenance updated:  no        OBJECTIVE:     No vitals were obtained today due to virtual telephone visit.    Physical Exam  GENERAL: alert and no distress  EYES: Eyes grossly normal to inspection.  No discharge or erythema, or obvious scleral/conjunctival abnormalities.  RESP: No audible wheeze, cough, or visible cyanosis.    SKIN: Visible skin clear. No significant rash, abnormal pigmentation or lesions.  NEURO: Cranial nerves grossly intact.  Mentation and speech appropriate for age.  PSYCH:  Appropriate affect, tone, and pace of words    ASSESSMENT/PLAN:                                                      Phone call duration: 7 minutes      ICD-10-CM    1. Missed   O02.1       2. 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 Encounter to discuss test results  Z71.2         Nory Woods is a 35 year old  here to discuss results and follow-up after D&C for missed ab, and RPL.   Reviewed results showing trisomy 8. Would not expect this to be a recurrent issue. Reviewed rarity of translocation as cause of RPL. Can meet with GC if desired, but can also wait until outcome of next pregnancy is known.   Discussed okay to start trying now if desired, or can wait until next menses as well. All questions answered.       January Lorenzo MD, MHS  [facility] [place]  25

## 2025-07-22 ENCOUNTER — LAB (OUTPATIENT)
Dept: LAB | Facility: CLINIC | Age: 35
End: 2025-07-22
Payer: COMMERCIAL

## 2025-07-22 DIAGNOSIS — Z98.890 HISTORY OF DILATION AND CURETTAGE: ICD-10-CM

## 2025-07-22 DIAGNOSIS — O02.1 MISSED ABORTION: ICD-10-CM

## 2025-07-22 LAB — HCG INTACT+B SERPL-ACNC: 4 MIU/ML

## 2025-07-22 PROCEDURE — 84702 CHORIONIC GONADOTROPIN TEST: CPT

## 2025-07-22 PROCEDURE — 36415 COLL VENOUS BLD VENIPUNCTURE: CPT

## 2025-07-23 ENCOUNTER — VIRTUAL VISIT (OUTPATIENT)
Dept: OBGYN | Facility: CLINIC | Age: 35
End: 2025-07-23
Payer: COMMERCIAL

## 2025-07-23 DIAGNOSIS — O02.1 MISSED ABORTION: Primary | ICD-10-CM

## 2025-07-23 DIAGNOSIS — Z71.2 ENCOUNTER TO DISCUSS TEST RESULTS: ICD-10-CM

## 2025-07-23 DIAGNOSIS — N96 RECURRENT PREGNANCY LOSS WITHOUT CURRENT PREGNANCY: ICD-10-CM

## 2025-07-23 PROCEDURE — 98016 BRIEF COMUNICAJ TECH-BSD SVC: CPT | Performed by: STUDENT IN AN ORGANIZED HEALTH CARE EDUCATION/TRAINING PROGRAM

## 2025-08-21 LAB — SCANNED LAB RESULT: NORMAL

## (undated) DEVICE — BAG DECANTER STERILE WHITE DYNJDEC09

## (undated) DEVICE — LIGHT HANDLE X2

## (undated) DEVICE — GLOVE PROTEXIS BLUE W/NEU-THERA 7.0  2D73EB70

## (undated) DEVICE — STPL SKIN PROXIMATE 35 WIDE PMW35

## (undated) DEVICE — LINEN BABY BLANKET 5434

## (undated) DEVICE — LINEN TOWEL PACK X5 5464

## (undated) DEVICE — DRAPE POUCH IRR 1016

## (undated) DEVICE — SUCTION CANNULA UTERINE 08MM CVD  20317

## (undated) DEVICE — GLOVE BIOGEL PI ULTRATOUCH SZ 7.0 41170

## (undated) DEVICE — TUBING VACUUM COLLECTION 6FT 23116

## (undated) DEVICE — DRAPE SHEET REV FOLD 3/4 9349

## (undated) DEVICE — TUBING SUCTION VACUUM COLLECTION 6FTX3/8" 022310

## (undated) DEVICE — DRSG KERLIX FLUFFS X5

## (undated) DEVICE — SUCTION CANISTER MEDIVAC LINER 3000ML W/LID 65651-530

## (undated) DEVICE — PACK TVT HYSTEROSCOPY SMA15HYFSE

## (undated) DEVICE — SOL WATER IRRIG 1000ML BOTTLE 2F7114

## (undated) DEVICE — SOL NACL 0.9% IRRIG 1000ML BOTTLE 07138-09

## (undated) DEVICE — ESU GROUND PAD UNIVERSAL W/O CORD

## (undated) DEVICE — PREP CHLORAPREP 26ML TINTED ORANGE  260815

## (undated) DEVICE — PACK C-SECTION LF PL15OTA83B

## (undated) DEVICE — BLADE CLIPPER 4406

## (undated) DEVICE — SOL WATER IRRIG 1000ML BOTTLE 07139-09

## (undated) DEVICE — GLOVE PROTEXIS POWDER FREE ORANGE 6.5  2D72PT65X

## (undated) DEVICE — DRSG ABDOMINAL 07 1/2X8" 7197D

## (undated) DEVICE — PAD CHUX UNDERPAD 23X24" 7136

## (undated) DEVICE — SUCTION CANNULA UTERINE 07MM CVD 022107-10

## (undated) DEVICE — PACK SET-UP STD 9102

## (undated) DEVICE — DEVICE FETAL PILLOW BALLOON SIL PF-010

## (undated) RX ORDER — KETOROLAC TROMETHAMINE 30 MG/ML
INJECTION, SOLUTION INTRAMUSCULAR; INTRAVENOUS
Status: DISPENSED
Start: 2023-03-25

## (undated) RX ORDER — LIDOCAINE HCL/EPINEPHRINE/PF 2%-1:200K
VIAL (ML) INJECTION
Status: DISPENSED
Start: 2023-03-25

## (undated) RX ORDER — FENTANYL CITRATE 50 UG/ML
INJECTION, SOLUTION INTRAMUSCULAR; INTRAVENOUS
Status: DISPENSED
Start: 2025-06-17

## (undated) RX ORDER — ONDANSETRON 2 MG/ML
INJECTION INTRAMUSCULAR; INTRAVENOUS
Status: DISPENSED
Start: 2023-03-25

## (undated) RX ORDER — ONDANSETRON 2 MG/ML
INJECTION INTRAMUSCULAR; INTRAVENOUS
Status: DISPENSED
Start: 2025-06-17

## (undated) RX ORDER — DOXYCYCLINE 100 MG/10ML
INJECTION, POWDER, LYOPHILIZED, FOR SOLUTION INTRAVENOUS
Status: DISPENSED
Start: 2021-12-28

## (undated) RX ORDER — MORPHINE SULFATE 1 MG/ML
INJECTION, SOLUTION EPIDURAL; INTRATHECAL; INTRAVENOUS
Status: DISPENSED
Start: 2023-03-25

## (undated) RX ORDER — FENTANYL CITRATE 50 UG/ML
INJECTION, SOLUTION INTRAMUSCULAR; INTRAVENOUS
Status: DISPENSED
Start: 2021-12-28

## (undated) RX ORDER — ACETAMINOPHEN 325 MG/1
TABLET ORAL
Status: DISPENSED
Start: 2025-06-17

## (undated) RX ORDER — HYDROMORPHONE HYDROCHLORIDE 1 MG/ML
INJECTION, SOLUTION INTRAMUSCULAR; INTRAVENOUS; SUBCUTANEOUS
Status: DISPENSED
Start: 2021-12-28

## (undated) RX ORDER — OXYTOCIN/0.9 % SODIUM CHLORIDE 30/500 ML
PLASTIC BAG, INJECTION (ML) INTRAVENOUS
Status: DISPENSED
Start: 2023-03-25